# Patient Record
Sex: FEMALE | Race: WHITE | NOT HISPANIC OR LATINO | URBAN - METROPOLITAN AREA
[De-identification: names, ages, dates, MRNs, and addresses within clinical notes are randomized per-mention and may not be internally consistent; named-entity substitution may affect disease eponyms.]

---

## 2017-06-05 ENCOUNTER — OUTPATIENT (OUTPATIENT)
Dept: OUTPATIENT SERVICES | Facility: HOSPITAL | Age: 57
LOS: 1 days | End: 2017-06-05
Payer: COMMERCIAL

## 2017-06-05 VITALS
SYSTOLIC BLOOD PRESSURE: 139 MMHG | DIASTOLIC BLOOD PRESSURE: 78 MMHG | HEIGHT: 63 IN | WEIGHT: 160.5 LBS | TEMPERATURE: 97 F | HEART RATE: 83 BPM | RESPIRATION RATE: 16 BRPM

## 2017-06-05 DIAGNOSIS — Z80.0 FAMILY HISTORY OF MALIGNANT NEOPLASM OF DIGESTIVE ORGANS: ICD-10-CM

## 2017-06-05 DIAGNOSIS — Z01.818 ENCOUNTER FOR OTHER PREPROCEDURAL EXAMINATION: ICD-10-CM

## 2017-06-05 DIAGNOSIS — Z98.891 HISTORY OF UTERINE SCAR FROM PREVIOUS SURGERY: Chronic | ICD-10-CM

## 2017-06-05 LAB
ALBUMIN SERPL ELPH-MCNC: 4.4 G/DL — SIGNIFICANT CHANGE UP (ref 3.3–5.2)
ALP SERPL-CCNC: 93 U/L — SIGNIFICANT CHANGE UP (ref 40–120)
ALT FLD-CCNC: 23 U/L — SIGNIFICANT CHANGE UP
ANION GAP SERPL CALC-SCNC: 10 MMOL/L — SIGNIFICANT CHANGE UP (ref 5–17)
APTT BLD: 30.4 SEC — SIGNIFICANT CHANGE UP (ref 27.5–37.4)
AST SERPL-CCNC: 23 U/L — SIGNIFICANT CHANGE UP
BILIRUB SERPL-MCNC: 0.5 MG/DL — SIGNIFICANT CHANGE UP (ref 0.4–2)
BUN SERPL-MCNC: 24 MG/DL — HIGH (ref 8–20)
CALCIUM SERPL-MCNC: 9.6 MG/DL — SIGNIFICANT CHANGE UP (ref 8.6–10.2)
CHLORIDE SERPL-SCNC: 102 MMOL/L — SIGNIFICANT CHANGE UP (ref 98–107)
CO2 SERPL-SCNC: 28 MMOL/L — SIGNIFICANT CHANGE UP (ref 22–29)
CREAT SERPL-MCNC: 0.76 MG/DL — SIGNIFICANT CHANGE UP (ref 0.5–1.3)
GLUCOSE SERPL-MCNC: 115 MG/DL — SIGNIFICANT CHANGE UP (ref 70–115)
HCT VFR BLD CALC: 41.7 % — SIGNIFICANT CHANGE UP (ref 37–47)
HGB BLD-MCNC: 14.3 G/DL — SIGNIFICANT CHANGE UP (ref 12–16)
INR BLD: 0.88 RATIO — SIGNIFICANT CHANGE UP (ref 0.88–1.16)
MCHC RBC-ENTMCNC: 34.2 PG — HIGH (ref 27–31)
MCHC RBC-ENTMCNC: 34.3 G/DL — SIGNIFICANT CHANGE UP (ref 32–36)
MCV RBC AUTO: 99.8 FL — HIGH (ref 81–99)
PLATELET # BLD AUTO: 193 K/UL — SIGNIFICANT CHANGE UP (ref 150–400)
POTASSIUM SERPL-MCNC: 4.2 MMOL/L — SIGNIFICANT CHANGE UP (ref 3.5–5.3)
POTASSIUM SERPL-SCNC: 4.2 MMOL/L — SIGNIFICANT CHANGE UP (ref 3.5–5.3)
PROT SERPL-MCNC: 7.9 G/DL — SIGNIFICANT CHANGE UP (ref 6.6–8.7)
PROTHROM AB SERPL-ACNC: 9.7 SEC — LOW (ref 9.8–12.7)
RBC # BLD: 4.18 M/UL — LOW (ref 4.4–5.2)
RBC # FLD: 13.3 % — SIGNIFICANT CHANGE UP (ref 11–15.6)
SODIUM SERPL-SCNC: 140 MMOL/L — SIGNIFICANT CHANGE UP (ref 135–145)
WBC # BLD: 5.7 K/UL — SIGNIFICANT CHANGE UP (ref 4.8–10.8)
WBC # FLD AUTO: 5.7 K/UL — SIGNIFICANT CHANGE UP (ref 4.8–10.8)

## 2017-06-05 PROCEDURE — 80053 COMPREHEN METABOLIC PANEL: CPT

## 2017-06-05 PROCEDURE — 85610 PROTHROMBIN TIME: CPT

## 2017-06-05 PROCEDURE — G0463: CPT

## 2017-06-05 PROCEDURE — 93005 ELECTROCARDIOGRAM TRACING: CPT

## 2017-06-05 PROCEDURE — 85027 COMPLETE CBC AUTOMATED: CPT

## 2017-06-05 PROCEDURE — 85730 THROMBOPLASTIN TIME PARTIAL: CPT

## 2017-06-05 PROCEDURE — 93010 ELECTROCARDIOGRAM REPORT: CPT

## 2017-06-05 NOTE — H&P PST ADULT - NEGATIVE SKIN SYMPTOMS
no tumor/no pitted nails/no itching/no hair loss/no change in size/color of mole/no dryness/no brittle nails/no rash

## 2017-06-05 NOTE — H&P PST ADULT - NEGATIVE GENERAL SYMPTOMS
no malaise/no polyphagia/no weight loss/no fatigue/no polyuria/no anorexia/no weight gain/no fever/no chills/no polydipsia/no sweating

## 2017-06-05 NOTE — H&P PST ADULT - NEGATIVE CARDIOVASCULAR SYMPTOMS
no chest pain/no dyspnea on exertion/no paroxysmal nocturnal dyspnea/no peripheral edema/no orthopnea/no claudication

## 2017-06-05 NOTE — H&P PST ADULT - FAMILY HISTORY
Father  Still living? No  Family history of colon cancer, Age at diagnosis: Age Unknown     Mother  Still living? Yes, Estimated age: 81-90  Family history of early CAD, Age at diagnosis: Age Unknown     Sibling  Still living? Yes, Estimated age: 61-70  Family history of bipolar disorder, Age at diagnosis: Age Unknown

## 2017-06-05 NOTE — H&P PST ADULT - NEGATIVE FEMALE-SPECIFIC SYMPTOMS
no irregular menses/no vaginal discharge/no pelvic pain/no menorrhagia/no spotting/no abnormal vaginal bleeding/no amenorrhea/no dysmenorrhea

## 2017-06-05 NOTE — H&P PST ADULT - NEGATIVE ENMT SYMPTOMS
no abnormal taste sensation/no hearing difficulty/no nasal obstruction/no sinus symptoms/no post-nasal discharge/no dry mouth/no dysphagia/no vertigo/no nasal discharge/no tinnitus/no nose bleeds/no recurrent cold sores/no gum bleeding/no nasal congestion/no throat pain

## 2017-06-05 NOTE — H&P PST ADULT - PMH
Anxiety    Asthma    Bipolar 1 disorder    Chest pain  with anxiety  COPD (chronic obstructive pulmonary disease)    Depressed    Diverticulosis    Hypertension    Lipoma of abdominal wall  left lateral  JAGDISH (obstructive sleep apnea)  mild

## 2017-06-05 NOTE — H&P PST ADULT - NEGATIVE GENERAL GENITOURINARY SYMPTOMS
no nocturia/normal libido/no flank pain R/no bladder infections/no dysuria/no urinary hesitancy/normal urinary frequency/no urine discoloration/no flank pain L/no gas in urine/no renal colic/no hematuria

## 2017-06-05 NOTE — H&P PST ADULT - NEGATIVE MUSCULOSKELETAL SYMPTOMS
no stiffness/no joint swelling/no neck pain/no arm pain R/no myalgia/no leg pain L/no muscle weakness/no arthralgia/no muscle cramps/no arthritis/no back pain/no leg pain R/no arm pain L

## 2017-06-05 NOTE — H&P PST ADULT - NEGATIVE BREAST SYMPTOMS
no breast tenderness R/no nipple discharge L/no breast lump R/no breast lump L/no nipple discharge R/no breast tenderness L

## 2017-06-05 NOTE — H&P PST ADULT - MUSCULOSKELETAL
details… detailed exam normal strength/normal/decreased ROM/ROM intact/no joint erythema/no calf tenderness/no joint swelling/no joint warmth

## 2017-06-05 NOTE — H&P PST ADULT - RS GEN PE MLT RESP DETAILS PC
breath sounds equal/no subcutaneous emphysema/no wheezes/no rhonchi/good air movement/clear to auscultation bilaterally/no intercostal retractions/normal/airway patent/respirations non-labored/no rales/no chest wall tenderness

## 2017-06-05 NOTE — H&P PST ADULT - NEGATIVE OPHTHALMOLOGIC SYMPTOMS
no blurred vision R/no loss of vision R/no lacrimation L/no discharge L/no photophobia/no discharge R/no diplopia/no scleral injection L/no lacrimation R/no pain L/no pain R/no scleral injection R/no loss of vision L/no irritation L/no irritation R/no blurred vision L

## 2017-06-05 NOTE — H&P PST ADULT - GASTROINTESTINAL DETAILS
nontender/bowel sounds normal/normal/no rigidity/no organomegaly/no distention/no masses palpable/no bruit/soft/no rebound tenderness/no guarding

## 2017-06-05 NOTE — H&P PST ADULT - NEUROLOGICAL DETAILS
responds to verbal commands/deep reflexes intact/no spontaneous movement/normal strength/cranial nerves intact/sensation intact/alert and oriented x 3/superficial reflexes intact/responds to pain

## 2017-06-05 NOTE — H&P PST ADULT - NEGATIVE NEUROLOGICAL SYMPTOMS
no headache/no difficulty walking/no syncope/no hemiparesis/no tremors/no confusion/no generalized seizures/no loss of consciousness/no paresthesias/no weakness/no facial palsy/no transient paralysis/no loss of sensation/no vertigo/no focal seizures

## 2017-06-05 NOTE — H&P PST ADULT - HISTORY OF PRESENT ILLNESS
55 y/o female with h/o colon polyps now presents for colonoscopy with possible polypectomy and upper endoscopy with possible biopsy 55 y/o female with h/o colon polyps, large left abdominal lipoma and midline ventral hernia now presents for colonoscopy with possible polypectomy and upper endoscopy with possible biopsy

## 2017-06-05 NOTE — H&P PST ADULT - NSANTHOSAYNRD_GEN_A_CORE
No. JAGDISH screening performed.  STOP BANG Legend: 0-2 = LOW Risk; 3-4 = INTERMEDIATE Risk; 5-8 = HIGH Risk

## 2017-07-06 ENCOUNTER — OUTPATIENT (OUTPATIENT)
Dept: OUTPATIENT SERVICES | Facility: HOSPITAL | Age: 57
LOS: 1 days | End: 2017-07-06

## 2017-07-06 VITALS
TEMPERATURE: 98 F | HEART RATE: 86 BPM | DIASTOLIC BLOOD PRESSURE: 80 MMHG | WEIGHT: 158.73 LBS | HEIGHT: 63 IN | RESPIRATION RATE: 20 BRPM | SYSTOLIC BLOOD PRESSURE: 120 MMHG

## 2017-07-06 DIAGNOSIS — Z98.891 HISTORY OF UTERINE SCAR FROM PREVIOUS SURGERY: Chronic | ICD-10-CM

## 2017-07-06 DIAGNOSIS — Z01.818 ENCOUNTER FOR OTHER PREPROCEDURAL EXAMINATION: ICD-10-CM

## 2017-07-06 NOTE — H&P PST ADULT - NEGATIVE ENMT SYMPTOMS
no nasal discharge/no nasal obstruction/no post-nasal discharge/no hearing difficulty/no gum bleeding/no ear pain/no nose bleeds/no recurrent cold sores/no tinnitus/no throat pain/no nasal congestion/no dysphagia/no abnormal taste sensation/no dry mouth/no vertigo/no sinus symptoms

## 2017-07-06 NOTE — H&P PST ADULT - NEGATIVE GENERAL GENITOURINARY SYMPTOMS
no renal colic/no incontinence/no urinary hesitancy/no dysuria/no nocturia/no hematuria/no flank pain R/no gas in urine/no flank pain L/normal libido/no bladder infections/no urine discoloration/normal urinary frequency

## 2017-07-06 NOTE — H&P PST ADULT - NEGATIVE GASTROINTESTINAL SYMPTOMS
no diarrhea/no vomiting/no abdominal pain/no flatulence/no constipation/no steatorrhea/no jaundice/no hematochezia/no nausea/no hiccoughs/no melena/no change in bowel habits

## 2017-07-06 NOTE — H&P PST ADULT - NEGATIVE MUSCULOSKELETAL SYMPTOMS
no muscle weakness/no leg pain L/no leg pain R/no joint swelling/no arthralgia/no back pain/no muscle cramps/no stiffness/no neck pain/no arthritis/no myalgia/no arm pain L/no arm pain R

## 2017-07-06 NOTE — H&P PST ADULT - NEGATIVE OPHTHALMOLOGIC SYMPTOMS
no discharge L/no photophobia/no lacrimation L/no blurred vision R/no irritation R/no loss of vision L/no pain R/no scleral injection L/no irritation L/no loss of vision R/no scleral injection R/no discharge R/no diplopia/no pain L/no lacrimation R/no blurred vision L

## 2017-07-06 NOTE — H&P PST ADULT - NEGATIVE PSYCHIATRIC SYMPTOMS
no hyperactivity/no anxiety/no agitation/no insomnia/no paranoia/no suicidal ideation/no visual hallucinations/no depression/no memory loss/no mood swings/no auditory hallucinations

## 2017-07-06 NOTE — H&P PST ADULT - NEGATIVE NEUROLOGICAL SYMPTOMS
no loss of consciousness/no hemiparesis/no vertigo/no loss of sensation/no focal seizures/no paresthesias/no weakness/no headache/no generalized seizures/no tremors/no facial palsy/no difficulty walking/no confusion/no transient paralysis/no syncope

## 2017-07-06 NOTE — H&P PST ADULT - NEGATIVE BREAST SYMPTOMS
no breast tenderness L/no breast lump R/no nipple discharge R/no nipple discharge L/no breast tenderness R/no breast lump L

## 2017-07-06 NOTE — H&P PST ADULT - NEGATIVE SKIN SYMPTOMS
no itching/no change in size/color of mole/no hair loss/no rash/no dryness/no tumor/no pitted nails/no brittle nails

## 2017-07-06 NOTE — H&P PST ADULT - NEGATIVE GENERAL SYMPTOMS
no fever/no polydipsia/no weight loss/no chills/no fatigue/no malaise/no weight gain/no sweating/no polyphagia/no polyuria/no anorexia

## 2017-07-06 NOTE — H&P PST ADULT - NEGATIVE FEMALE-SPECIFIC SYMPTOMS
no dysmenorrhea/no irregular menses/no spotting/no amenorrhea/no vaginal discharge/no abnormal vaginal bleeding/no pelvic pain/no menorrhagia

## 2017-07-06 NOTE — H&P PST ADULT - NEGATIVE CARDIOVASCULAR SYMPTOMS
no paroxysmal nocturnal dyspnea/no palpitations/no chest pain/no claudication/no orthopnea/no peripheral edema/no dyspnea on exertion

## 2017-07-07 ENCOUNTER — OUTPATIENT (OUTPATIENT)
Dept: OUTPATIENT SERVICES | Facility: HOSPITAL | Age: 57
LOS: 1 days | End: 2017-07-07
Payer: COMMERCIAL

## 2017-07-07 ENCOUNTER — APPOINTMENT (OUTPATIENT)
Dept: PULMONOLOGY | Facility: CLINIC | Age: 57
End: 2017-07-07

## 2017-07-07 VITALS — HEIGHT: 62.5 IN | WEIGHT: 162 LBS | BODY MASS INDEX: 29.07 KG/M2

## 2017-07-07 VITALS — OXYGEN SATURATION: 95 % | HEART RATE: 92 BPM | DIASTOLIC BLOOD PRESSURE: 70 MMHG | SYSTOLIC BLOOD PRESSURE: 130 MMHG

## 2017-07-07 DIAGNOSIS — Z87.19 PERSONAL HISTORY OF OTHER DISEASES OF THE DIGESTIVE SYSTEM: ICD-10-CM

## 2017-07-07 DIAGNOSIS — Z00.00 ENCOUNTER FOR GENERAL ADULT MEDICAL EXAMINATION W/OUT ABNORMAL FINDINGS: ICD-10-CM

## 2017-07-07 DIAGNOSIS — Z01.818 ENCOUNTER FOR OTHER PREPROCEDURAL EXAMINATION: ICD-10-CM

## 2017-07-07 DIAGNOSIS — Z78.9 OTHER SPECIFIED HEALTH STATUS: ICD-10-CM

## 2017-07-07 DIAGNOSIS — Z98.891 HISTORY OF UTERINE SCAR FROM PREVIOUS SURGERY: Chronic | ICD-10-CM

## 2017-07-07 LAB
ALBUMIN SERPL ELPH-MCNC: 4.5 G/DL — SIGNIFICANT CHANGE UP (ref 3.3–5.2)
ALP SERPL-CCNC: 100 U/L — SIGNIFICANT CHANGE UP (ref 40–120)
ALT FLD-CCNC: 18 U/L — SIGNIFICANT CHANGE UP
ANION GAP SERPL CALC-SCNC: 16 MMOL/L — SIGNIFICANT CHANGE UP (ref 5–17)
APTT BLD: 28.4 SEC — SIGNIFICANT CHANGE UP (ref 27.5–37.4)
AST SERPL-CCNC: 25 U/L — SIGNIFICANT CHANGE UP
BASOPHILS # BLD AUTO: 0 K/UL — SIGNIFICANT CHANGE UP (ref 0–0.2)
BASOPHILS NFR BLD AUTO: 0.4 % — SIGNIFICANT CHANGE UP (ref 0–2)
BILIRUB SERPL-MCNC: 0.3 MG/DL — LOW (ref 0.4–2)
BUN SERPL-MCNC: 28 MG/DL — HIGH (ref 8–20)
CALCIUM SERPL-MCNC: 9.3 MG/DL — SIGNIFICANT CHANGE UP (ref 8.6–10.2)
CHLORIDE SERPL-SCNC: 100 MMOL/L — SIGNIFICANT CHANGE UP (ref 98–107)
CO2 SERPL-SCNC: 24 MMOL/L — SIGNIFICANT CHANGE UP (ref 22–29)
CREAT SERPL-MCNC: 1.01 MG/DL — SIGNIFICANT CHANGE UP (ref 0.5–1.3)
EOSINOPHIL # BLD AUTO: 0.1 K/UL — SIGNIFICANT CHANGE UP (ref 0–0.5)
EOSINOPHIL NFR BLD AUTO: 2.6 % — SIGNIFICANT CHANGE UP (ref 0–6)
GLUCOSE SERPL-MCNC: 135 MG/DL — HIGH (ref 70–115)
HCT VFR BLD CALC: 38.3 % — SIGNIFICANT CHANGE UP (ref 37–47)
HGB BLD-MCNC: 13.3 G/DL — SIGNIFICANT CHANGE UP (ref 12–16)
INR BLD: 0.91 RATIO — SIGNIFICANT CHANGE UP (ref 0.88–1.16)
LYMPHOCYTES # BLD AUTO: 1.6 K/UL — SIGNIFICANT CHANGE UP (ref 1–4.8)
LYMPHOCYTES # BLD AUTO: 29 % — SIGNIFICANT CHANGE UP (ref 20–55)
MCHC RBC-ENTMCNC: 33.7 PG — HIGH (ref 27–31)
MCHC RBC-ENTMCNC: 34.7 G/DL — SIGNIFICANT CHANGE UP (ref 32–36)
MCV RBC AUTO: 97 FL — SIGNIFICANT CHANGE UP (ref 81–99)
MONOCYTES # BLD AUTO: 0.5 K/UL — SIGNIFICANT CHANGE UP (ref 0–0.8)
MONOCYTES NFR BLD AUTO: 9.7 % — SIGNIFICANT CHANGE UP (ref 3–10)
NEUTROPHILS # BLD AUTO: 3.1 K/UL — SIGNIFICANT CHANGE UP (ref 1.8–8)
NEUTROPHILS NFR BLD AUTO: 58.1 % — SIGNIFICANT CHANGE UP (ref 37–73)
PLATELET # BLD AUTO: 216 K/UL — SIGNIFICANT CHANGE UP (ref 150–400)
POTASSIUM SERPL-MCNC: 3.6 MMOL/L — SIGNIFICANT CHANGE UP (ref 3.5–5.3)
POTASSIUM SERPL-SCNC: 3.6 MMOL/L — SIGNIFICANT CHANGE UP (ref 3.5–5.3)
PROT SERPL-MCNC: 7.3 G/DL — SIGNIFICANT CHANGE UP (ref 6.6–8.7)
PROTHROM AB SERPL-ACNC: 10 SEC — SIGNIFICANT CHANGE UP (ref 9.8–12.7)
RBC # BLD: 3.95 M/UL — LOW (ref 4.4–5.2)
RBC # FLD: 13.2 % — SIGNIFICANT CHANGE UP (ref 11–15.6)
SODIUM SERPL-SCNC: 140 MMOL/L — SIGNIFICANT CHANGE UP (ref 135–145)
WBC # BLD: 5.4 K/UL — SIGNIFICANT CHANGE UP (ref 4.8–10.8)
WBC # FLD AUTO: 5.4 K/UL — SIGNIFICANT CHANGE UP (ref 4.8–10.8)

## 2017-07-07 PROCEDURE — 36415 COLL VENOUS BLD VENIPUNCTURE: CPT

## 2017-07-07 PROCEDURE — 85730 THROMBOPLASTIN TIME PARTIAL: CPT

## 2017-07-07 PROCEDURE — 80053 COMPREHEN METABOLIC PANEL: CPT

## 2017-07-07 PROCEDURE — 85027 COMPLETE CBC AUTOMATED: CPT

## 2017-07-07 PROCEDURE — 85610 PROTHROMBIN TIME: CPT

## 2017-07-07 RX ORDER — AMOXICILLIN AND CLAVULANATE POTASSIUM 875; 125 MG/1; MG/1
875-125 TABLET, COATED ORAL
Qty: 20 | Refills: 0 | Status: DISCONTINUED | COMMUNITY
Start: 2017-06-08 | End: 2017-07-07

## 2017-07-10 RX ORDER — TIOTROPIUM BROMIDE 18 UG/1
18 CAPSULE ORAL; RESPIRATORY (INHALATION) DAILY
Qty: 1 | Refills: 0 | Status: DISCONTINUED | COMMUNITY
Start: 2017-07-07 | End: 2017-07-10

## 2017-07-13 ENCOUNTER — OUTPATIENT (OUTPATIENT)
Dept: OUTPATIENT SERVICES | Facility: HOSPITAL | Age: 57
LOS: 1 days | End: 2017-07-13
Payer: COMMERCIAL

## 2017-07-13 ENCOUNTER — RESULT REVIEW (OUTPATIENT)
Age: 57
End: 2017-07-13

## 2017-07-13 DIAGNOSIS — Z98.891 HISTORY OF UTERINE SCAR FROM PREVIOUS SURGERY: Chronic | ICD-10-CM

## 2017-07-13 DIAGNOSIS — Z80.0 FAMILY HISTORY OF MALIGNANT NEOPLASM OF DIGESTIVE ORGANS: ICD-10-CM

## 2017-07-13 DIAGNOSIS — K59.00 CONSTIPATION, UNSPECIFIED: ICD-10-CM

## 2017-07-13 PROCEDURE — 88305 TISSUE EXAM BY PATHOLOGIST: CPT | Mod: 26

## 2017-07-13 PROCEDURE — 43239 EGD BIOPSY SINGLE/MULTIPLE: CPT

## 2017-07-13 PROCEDURE — 88342 IMHCHEM/IMCYTCHM 1ST ANTB: CPT

## 2017-07-13 PROCEDURE — 88305 TISSUE EXAM BY PATHOLOGIST: CPT

## 2017-07-13 PROCEDURE — 45378 DIAGNOSTIC COLONOSCOPY: CPT

## 2017-07-13 PROCEDURE — 88342 IMHCHEM/IMCYTCHM 1ST ANTB: CPT | Mod: 26

## 2017-07-14 ENCOUNTER — TRANSCRIPTION ENCOUNTER (OUTPATIENT)
Age: 57
End: 2017-07-14

## 2017-07-14 LAB — SURGICAL PATHOLOGY FINAL REPORT - CH: SIGNIFICANT CHANGE UP

## 2017-08-01 ENCOUNTER — APPOINTMENT (OUTPATIENT)
Dept: INTERNAL MEDICINE | Facility: CLINIC | Age: 57
End: 2017-08-01
Payer: MEDICAID

## 2017-08-01 ENCOUNTER — NON-APPOINTMENT (OUTPATIENT)
Age: 57
End: 2017-08-01

## 2017-08-01 VITALS
HEIGHT: 62.5 IN | OXYGEN SATURATION: 95 % | TEMPERATURE: 97.7 F | BODY MASS INDEX: 29.43 KG/M2 | HEART RATE: 96 BPM | WEIGHT: 164 LBS | DIASTOLIC BLOOD PRESSURE: 90 MMHG | SYSTOLIC BLOOD PRESSURE: 150 MMHG

## 2017-08-01 DIAGNOSIS — G25.81 RESTLESS LEGS SYNDROME: ICD-10-CM

## 2017-08-01 PROCEDURE — 93000 ELECTROCARDIOGRAM COMPLETE: CPT

## 2017-08-01 PROCEDURE — 82044 UR ALBUMIN SEMIQUANTITATIVE: CPT | Mod: QW

## 2017-08-01 PROCEDURE — 92552 PURE TONE AUDIOMETRY AIR: CPT

## 2017-08-01 PROCEDURE — 94010 BREATHING CAPACITY TEST: CPT

## 2017-08-01 PROCEDURE — 36415 COLL VENOUS BLD VENIPUNCTURE: CPT

## 2017-08-01 PROCEDURE — 99204 OFFICE O/P NEW MOD 45 MIN: CPT | Mod: 25

## 2017-08-01 RX ORDER — METOPROLOL TARTRATE AND HYDROCHLOROTHIAZIDE 100; 25 MG/1; MG/1
100-25 TABLET ORAL
Qty: 30 | Refills: 0 | Status: DISCONTINUED | COMMUNITY
Start: 2017-04-22 | End: 2017-08-01

## 2017-08-01 RX ORDER — METOPROLOL SUCCINATE 100 MG/1
100 TABLET, EXTENDED RELEASE ORAL
Qty: 30 | Refills: 0 | Status: DISCONTINUED | COMMUNITY
Start: 2017-03-16 | End: 2017-08-01

## 2017-08-01 RX ORDER — TRAZODONE HYDROCHLORIDE 50 MG/1
50 TABLET ORAL
Qty: 45 | Refills: 0 | Status: DISCONTINUED | COMMUNITY
Start: 2017-01-26 | End: 2017-08-01

## 2017-08-01 RX ORDER — BUSPIRONE HYDROCHLORIDE 10 MG/1
10 TABLET ORAL
Qty: 180 | Refills: 0 | Status: DISCONTINUED | COMMUNITY
Start: 2017-06-20 | End: 2017-08-01

## 2017-08-01 RX ORDER — NAPROXEN 500 MG/1
500 TABLET ORAL
Qty: 60 | Refills: 0 | Status: DISCONTINUED | COMMUNITY
Start: 2017-06-08 | End: 2017-08-01

## 2017-08-01 RX ORDER — LOSARTAN POTASSIUM 25 MG/1
25 TABLET, FILM COATED ORAL
Qty: 30 | Refills: 0 | Status: DISCONTINUED | COMMUNITY
Start: 2017-05-02 | End: 2017-08-01

## 2017-08-01 RX ORDER — BUSPIRONE HYDROCHLORIDE 15 MG/1
15 TABLET ORAL
Qty: 90 | Refills: 0 | Status: DISCONTINUED | COMMUNITY
Start: 2017-02-21 | End: 2017-08-01

## 2017-08-02 LAB
25(OH)D3 SERPL-MCNC: 34.3 NG/ML
ALBUMIN SERPL ELPH-MCNC: 4.4 G/DL
ALBUMIN: 80
ALP BLD-CCNC: 98 U/L
ALT SERPL-CCNC: 25 U/L
ANION GAP SERPL CALC-SCNC: 19 MMOL/L
APPEARANCE: CLEAR
AST SERPL-CCNC: 30 U/L
BASOPHILS # BLD AUTO: 0.04 K/UL
BASOPHILS NFR BLD AUTO: 0.7 %
BILIRUB SERPL-MCNC: <0.2 MG/DL
BILIRUBIN URINE: NEGATIVE
BLOOD URINE: NEGATIVE
BUN SERPL-MCNC: 25 MG/DL
CALCIUM SERPL-MCNC: 9.6 MG/DL
CHLORIDE SERPL-SCNC: 107 MMOL/L
CHOLEST SERPL-MCNC: 263 MG/DL
CHOLEST/HDLC SERPL: 3 RATIO
CK SERPL-CCNC: 313 U/L
CO2 SERPL-SCNC: 20 MMOL/L
COLOR: YELLOW
CREAT SERPL-MCNC: 1.1 MG/DL
CREATININE: 100
EOSINOPHIL # BLD AUTO: 0.35 K/UL
EOSINOPHIL NFR BLD AUTO: 6.4 %
GLUCOSE QUALITATIVE U: NORMAL MG/DL
GLUCOSE SERPL-MCNC: 107 MG/DL
HBA1C MFR BLD HPLC: 5.4 %
HCT VFR BLD CALC: 39.5 %
HDLC SERPL-MCNC: 89 MG/DL
HGB BLD-MCNC: 12.9 G/DL
IMM GRANULOCYTES NFR BLD AUTO: 0.2 %
KETONES URINE: NEGATIVE
LDLC SERPL CALC-MCNC: 145 MG/DL
LEUKOCYTE ESTERASE URINE: NEGATIVE
LYMPHOCYTES # BLD AUTO: 1.68 K/UL
LYMPHOCYTES NFR BLD AUTO: 30.5 %
MAN DIFF?: NORMAL
MCHC RBC-ENTMCNC: 32.7 GM/DL
MCHC RBC-ENTMCNC: 33.1 PG
MCV RBC AUTO: 101.3 FL
MICROALBUMIN/CREAT UR TEST STR-RTO: <30
MONOCYTES # BLD AUTO: 0.65 K/UL
MONOCYTES NFR BLD AUTO: 11.8 %
NEUTROPHILS # BLD AUTO: 2.77 K/UL
NEUTROPHILS NFR BLD AUTO: 50.4 %
NITRITE URINE: NEGATIVE
PH URINE: 5.5
PLATELET # BLD AUTO: 315 K/UL
POTASSIUM SERPL-SCNC: 4.5 MMOL/L
PROT SERPL-MCNC: 7.4 G/DL
PROTEIN URINE: NEGATIVE MG/DL
RBC # BLD: 3.9 M/UL
RBC # FLD: 13.9 %
SODIUM SERPL-SCNC: 146 MMOL/L
SPECIFIC GRAVITY URINE: 1.02
TRIGL SERPL-MCNC: 145 MG/DL
TSH SERPL-ACNC: 2.41 UIU/ML
URATE SERPL-MCNC: 4.8 MG/DL
UROBILINOGEN URINE: NORMAL MG/DL
WBC # FLD AUTO: 5.5 K/UL

## 2017-08-16 ENCOUNTER — MEDICATION RENEWAL (OUTPATIENT)
Age: 57
End: 2017-08-16

## 2017-09-05 ENCOUNTER — MEDICATION RENEWAL (OUTPATIENT)
Age: 57
End: 2017-09-05

## 2017-09-12 ENCOUNTER — RX RENEWAL (OUTPATIENT)
Age: 57
End: 2017-09-12

## 2017-09-13 ENCOUNTER — MEDICATION RENEWAL (OUTPATIENT)
Age: 57
End: 2017-09-13

## 2017-10-03 ENCOUNTER — APPOINTMENT (OUTPATIENT)
Dept: INTERNAL MEDICINE | Facility: CLINIC | Age: 57
End: 2017-10-03
Payer: MEDICAID

## 2017-10-03 ENCOUNTER — OTHER (OUTPATIENT)
Age: 57
End: 2017-10-03

## 2017-10-03 VITALS
WEIGHT: 174 LBS | HEART RATE: 87 BPM | BODY MASS INDEX: 31.22 KG/M2 | TEMPERATURE: 98 F | OXYGEN SATURATION: 97 % | HEIGHT: 62.5 IN | DIASTOLIC BLOOD PRESSURE: 92 MMHG | SYSTOLIC BLOOD PRESSURE: 150 MMHG

## 2017-10-03 PROCEDURE — 99215 OFFICE O/P EST HI 40 MIN: CPT | Mod: 25

## 2017-10-03 PROCEDURE — 90688 IIV4 VACCINE SPLT 0.5 ML IM: CPT

## 2017-10-03 PROCEDURE — G0008: CPT

## 2017-10-11 ENCOUNTER — MEDICATION RENEWAL (OUTPATIENT)
Age: 57
End: 2017-10-11

## 2017-10-17 ENCOUNTER — APPOINTMENT (OUTPATIENT)
Dept: INTERNAL MEDICINE | Facility: CLINIC | Age: 57
End: 2017-10-17

## 2017-10-18 ENCOUNTER — APPOINTMENT (OUTPATIENT)
Dept: INTERNAL MEDICINE | Facility: CLINIC | Age: 57
End: 2017-10-18
Payer: MEDICAID

## 2017-10-18 ENCOUNTER — OTHER (OUTPATIENT)
Age: 57
End: 2017-10-18

## 2017-10-18 VITALS
HEIGHT: 62.5 IN | DIASTOLIC BLOOD PRESSURE: 80 MMHG | TEMPERATURE: 97.7 F | SYSTOLIC BLOOD PRESSURE: 134 MMHG | WEIGHT: 174 LBS | HEART RATE: 87 BPM | OXYGEN SATURATION: 97 % | BODY MASS INDEX: 31.22 KG/M2

## 2017-10-18 PROCEDURE — 99214 OFFICE O/P EST MOD 30 MIN: CPT | Mod: 25

## 2017-10-18 PROCEDURE — 99407 BEHAV CHNG SMOKING > 10 MIN: CPT

## 2017-10-21 ENCOUNTER — TRANSCRIPTION ENCOUNTER (OUTPATIENT)
Age: 57
End: 2017-10-21

## 2017-11-02 ENCOUNTER — FORM ENCOUNTER (OUTPATIENT)
Age: 57
End: 2017-11-02

## 2017-11-03 ENCOUNTER — NON-APPOINTMENT (OUTPATIENT)
Age: 57
End: 2017-11-03

## 2017-11-03 ENCOUNTER — OUTPATIENT (OUTPATIENT)
Dept: OUTPATIENT SERVICES | Facility: HOSPITAL | Age: 57
LOS: 1 days | End: 2017-11-03
Payer: COMMERCIAL

## 2017-11-03 ENCOUNTER — APPOINTMENT (OUTPATIENT)
Dept: INTERNAL MEDICINE | Facility: CLINIC | Age: 57
End: 2017-11-03
Payer: MEDICAID

## 2017-11-03 ENCOUNTER — APPOINTMENT (OUTPATIENT)
Dept: CT IMAGING | Facility: CLINIC | Age: 57
End: 2017-11-03
Payer: MEDICAID

## 2017-11-03 VITALS
SYSTOLIC BLOOD PRESSURE: 140 MMHG | HEART RATE: 91 BPM | HEIGHT: 62.5 IN | OXYGEN SATURATION: 97 % | BODY MASS INDEX: 31.58 KG/M2 | DIASTOLIC BLOOD PRESSURE: 86 MMHG | TEMPERATURE: 98.1 F | WEIGHT: 176 LBS

## 2017-11-03 DIAGNOSIS — Z98.891 HISTORY OF UTERINE SCAR FROM PREVIOUS SURGERY: Chronic | ICD-10-CM

## 2017-11-03 DIAGNOSIS — K57.93 DIVERTICULITIS OF INTESTINE, PART UNSPECIFIED, WITHOUT PERFORATION OR ABSCESS WITH BLEEDING: ICD-10-CM

## 2017-11-03 DIAGNOSIS — R07.81 PLEURODYNIA: ICD-10-CM

## 2017-11-03 PROCEDURE — 99215 OFFICE O/P EST HI 40 MIN: CPT | Mod: 25

## 2017-11-03 PROCEDURE — 94060 EVALUATION OF WHEEZING: CPT

## 2017-11-03 PROCEDURE — 74176 CT ABD & PELVIS W/O CONTRAST: CPT

## 2017-11-03 PROCEDURE — 74176 CT ABD & PELVIS W/O CONTRAST: CPT | Mod: 26

## 2017-11-06 ENCOUNTER — APPOINTMENT (OUTPATIENT)
Dept: PULMONOLOGY | Facility: CLINIC | Age: 57
End: 2017-11-06
Payer: MEDICAID

## 2017-11-06 VITALS
OXYGEN SATURATION: 87 % | SYSTOLIC BLOOD PRESSURE: 140 MMHG | DIASTOLIC BLOOD PRESSURE: 90 MMHG | RESPIRATION RATE: 23 BRPM | TEMPERATURE: 98.2 F | HEART RATE: 90 BPM

## 2017-11-06 PROCEDURE — 99214 OFFICE O/P EST MOD 30 MIN: CPT

## 2017-11-07 ENCOUNTER — APPOINTMENT (OUTPATIENT)
Dept: MAMMOGRAPHY | Facility: CLINIC | Age: 57
End: 2017-11-07

## 2017-11-09 ENCOUNTER — MEDICATION RENEWAL (OUTPATIENT)
Age: 57
End: 2017-11-09

## 2017-11-10 ENCOUNTER — APPOINTMENT (OUTPATIENT)
Dept: INTERNAL MEDICINE | Facility: CLINIC | Age: 57
End: 2017-11-10

## 2017-11-20 ENCOUNTER — APPOINTMENT (OUTPATIENT)
Dept: PULMONOLOGY | Facility: CLINIC | Age: 57
End: 2017-11-20

## 2017-11-27 ENCOUNTER — APPOINTMENT (OUTPATIENT)
Dept: PULMONOLOGY | Facility: CLINIC | Age: 57
End: 2017-11-27
Payer: MEDICAID

## 2017-11-27 VITALS
HEIGHT: 62 IN | DIASTOLIC BLOOD PRESSURE: 76 MMHG | SYSTOLIC BLOOD PRESSURE: 136 MMHG | BODY MASS INDEX: 33.21 KG/M2 | WEIGHT: 180.5 LBS | OXYGEN SATURATION: 99 % | HEART RATE: 90 BPM

## 2017-11-27 PROCEDURE — 99215 OFFICE O/P EST HI 40 MIN: CPT

## 2017-11-27 RX ORDER — UMECLIDINIUM 62.5 UG/1
62.5 AEROSOL, POWDER ORAL DAILY
Qty: 1 | Refills: 5 | Status: DISCONTINUED | COMMUNITY
End: 2017-11-27

## 2017-11-27 RX ORDER — ERGOCALCIFEROL 1.25 MG/1
1.25 MG CAPSULE, LIQUID FILLED ORAL
Qty: 4 | Refills: 0 | Status: DISCONTINUED | COMMUNITY
Start: 2017-01-27 | End: 2017-11-27

## 2017-11-27 RX ORDER — NICOTINE 4 MG/1
10 INHALANT RESPIRATORY (INHALATION)
Qty: 1 | Refills: 2 | Status: DISCONTINUED | COMMUNITY
Start: 2017-10-18 | End: 2017-11-27

## 2017-11-27 RX ORDER — PREDNISONE 5 MG/1
5 TABLET ORAL
Qty: 75 | Refills: 0 | Status: DISCONTINUED | COMMUNITY
Start: 2017-11-06 | End: 2017-11-27

## 2017-11-29 ENCOUNTER — APPOINTMENT (OUTPATIENT)
Dept: INTERNAL MEDICINE | Facility: CLINIC | Age: 57
End: 2017-11-29
Payer: MEDICAID

## 2017-11-29 ENCOUNTER — MEDICATION RENEWAL (OUTPATIENT)
Age: 57
End: 2017-11-29

## 2017-11-29 VITALS
BODY MASS INDEX: 33.31 KG/M2 | HEIGHT: 62 IN | DIASTOLIC BLOOD PRESSURE: 84 MMHG | OXYGEN SATURATION: 97 % | HEART RATE: 83 BPM | WEIGHT: 181 LBS | SYSTOLIC BLOOD PRESSURE: 120 MMHG | TEMPERATURE: 97.8 F

## 2017-11-29 PROCEDURE — 99214 OFFICE O/P EST MOD 30 MIN: CPT | Mod: 25

## 2017-11-29 PROCEDURE — 36415 COLL VENOUS BLD VENIPUNCTURE: CPT

## 2017-11-29 RX ORDER — METRONIDAZOLE 500 MG/1
500 TABLET ORAL 3 TIMES DAILY
Qty: 30 | Refills: 0 | Status: DISCONTINUED | COMMUNITY
Start: 2017-11-03 | End: 2017-11-29

## 2017-12-01 ENCOUNTER — CHART COPY (OUTPATIENT)
Age: 57
End: 2017-12-01

## 2017-12-01 ENCOUNTER — APPOINTMENT (OUTPATIENT)
Dept: INTERNAL MEDICINE | Facility: CLINIC | Age: 57
End: 2017-12-01

## 2017-12-01 ENCOUNTER — MEDICATION RENEWAL (OUTPATIENT)
Age: 57
End: 2017-12-01

## 2017-12-01 LAB
ALBUMIN SERPL ELPH-MCNC: 3.8 G/DL
ALP BLD-CCNC: 67 U/L
ALT SERPL-CCNC: 15 U/L
ANION GAP SERPL CALC-SCNC: 13 MMOL/L
AST SERPL-CCNC: 14 U/L
BASOPHILS # BLD AUTO: 0.02 K/UL
BASOPHILS NFR BLD AUTO: 0.3 %
BILIRUB SERPL-MCNC: 0.3 MG/DL
BUN SERPL-MCNC: 24 MG/DL
CALCIUM SERPL-MCNC: 9.2 MG/DL
CHLORIDE SERPL-SCNC: 101 MMOL/L
CO2 SERPL-SCNC: 27 MMOL/L
CREAT SERPL-MCNC: 1.01 MG/DL
CRP SERPL-MCNC: 1.1 MG/DL
EOSINOPHIL # BLD AUTO: 0.22 K/UL
EOSINOPHIL NFR BLD AUTO: 3.1 %
ERYTHROCYTE [SEDIMENTATION RATE] IN BLOOD BY WESTERGREN METHOD: 48 MM/HR
GLUCOSE SERPL-MCNC: 103 MG/DL
HCT VFR BLD CALC: 38.6 %
HGB BLD-MCNC: 12.4 G/DL
IMM GRANULOCYTES NFR BLD AUTO: 0.1 %
LYMPHOCYTES # BLD AUTO: 1.48 K/UL
LYMPHOCYTES NFR BLD AUTO: 20.6 %
MAN DIFF?: NORMAL
MCHC RBC-ENTMCNC: 32.1 GM/DL
MCHC RBC-ENTMCNC: 33.3 PG
MCV RBC AUTO: 103.8 FL
MONOCYTES # BLD AUTO: 0.77 K/UL
MONOCYTES NFR BLD AUTO: 10.7 %
NEUTROPHILS # BLD AUTO: 4.69 K/UL
NEUTROPHILS NFR BLD AUTO: 65.2 %
PLATELET # BLD AUTO: 289 K/UL
POTASSIUM SERPL-SCNC: 4.7 MMOL/L
PROT SERPL-MCNC: 6.2 G/DL
RBC # BLD: 3.72 M/UL
RBC # FLD: 14.1 %
SODIUM SERPL-SCNC: 141 MMOL/L
WBC # FLD AUTO: 7.19 K/UL

## 2017-12-02 LAB
A1AT SERPL-MCNC: 180 MG/DL
CANCER AG125 SERPL-ACNC: 6 U/ML
FOLATE SERPL-MCNC: 12.2 NG/ML
TOTAL IGE SMQN RAST: 146 KU/L
VIT B12 SERPL-MCNC: 317 PG/ML

## 2017-12-03 ENCOUNTER — FORM ENCOUNTER (OUTPATIENT)
Age: 57
End: 2017-12-03

## 2017-12-04 ENCOUNTER — APPOINTMENT (OUTPATIENT)
Dept: CT IMAGING | Facility: CLINIC | Age: 57
End: 2017-12-04
Payer: MEDICAID

## 2017-12-04 ENCOUNTER — OUTPATIENT (OUTPATIENT)
Dept: OUTPATIENT SERVICES | Facility: HOSPITAL | Age: 57
LOS: 1 days | End: 2017-12-04
Payer: COMMERCIAL

## 2017-12-04 DIAGNOSIS — Z98.891 HISTORY OF UTERINE SCAR FROM PREVIOUS SURGERY: Chronic | ICD-10-CM

## 2017-12-04 DIAGNOSIS — Z87.891 PERSONAL HISTORY OF NICOTINE DEPENDENCE: ICD-10-CM

## 2017-12-04 PROCEDURE — G0297: CPT

## 2017-12-04 PROCEDURE — G0297: CPT | Mod: 26

## 2017-12-08 ENCOUNTER — APPOINTMENT (OUTPATIENT)
Dept: INTERNAL MEDICINE | Facility: CLINIC | Age: 57
End: 2017-12-08
Payer: MEDICAID

## 2017-12-08 VITALS
SYSTOLIC BLOOD PRESSURE: 140 MMHG | HEART RATE: 93 BPM | BODY MASS INDEX: 32.39 KG/M2 | DIASTOLIC BLOOD PRESSURE: 80 MMHG | TEMPERATURE: 97.9 F | WEIGHT: 176 LBS | OXYGEN SATURATION: 93 % | HEIGHT: 62 IN

## 2017-12-08 DIAGNOSIS — R63.5 ABNORMAL WEIGHT GAIN: ICD-10-CM

## 2017-12-08 PROCEDURE — 99213 OFFICE O/P EST LOW 20 MIN: CPT

## 2017-12-13 ENCOUNTER — FORM ENCOUNTER (OUTPATIENT)
Age: 57
End: 2017-12-13

## 2017-12-14 ENCOUNTER — APPOINTMENT (OUTPATIENT)
Dept: MRI IMAGING | Facility: CLINIC | Age: 57
End: 2017-12-14
Payer: MEDICAID

## 2017-12-14 ENCOUNTER — OUTPATIENT (OUTPATIENT)
Dept: OUTPATIENT SERVICES | Facility: HOSPITAL | Age: 57
LOS: 1 days | End: 2017-12-14
Payer: COMMERCIAL

## 2017-12-14 DIAGNOSIS — Z98.891 HISTORY OF UTERINE SCAR FROM PREVIOUS SURGERY: Chronic | ICD-10-CM

## 2017-12-14 DIAGNOSIS — M54.9 DORSALGIA, UNSPECIFIED: ICD-10-CM

## 2017-12-14 PROCEDURE — 72148 MRI LUMBAR SPINE W/O DYE: CPT

## 2017-12-14 PROCEDURE — 72148 MRI LUMBAR SPINE W/O DYE: CPT | Mod: 26

## 2017-12-20 ENCOUNTER — APPOINTMENT (OUTPATIENT)
Dept: INTERNAL MEDICINE | Facility: CLINIC | Age: 57
End: 2017-12-20
Payer: MEDICAID

## 2017-12-20 VITALS
BODY MASS INDEX: 31.64 KG/M2 | WEIGHT: 173 LBS | DIASTOLIC BLOOD PRESSURE: 86 MMHG | SYSTOLIC BLOOD PRESSURE: 140 MMHG | HEART RATE: 82 BPM | OXYGEN SATURATION: 96 % | TEMPERATURE: 97.6 F | RESPIRATION RATE: 18 BRPM

## 2017-12-20 PROCEDURE — 99213 OFFICE O/P EST LOW 20 MIN: CPT

## 2018-01-04 ENCOUNTER — MEDICATION RENEWAL (OUTPATIENT)
Age: 58
End: 2018-01-04

## 2018-01-05 ENCOUNTER — MEDICATION RENEWAL (OUTPATIENT)
Age: 58
End: 2018-01-05

## 2018-01-08 ENCOUNTER — APPOINTMENT (OUTPATIENT)
Dept: INTERNAL MEDICINE | Facility: CLINIC | Age: 58
End: 2018-01-08
Payer: MEDICAID

## 2018-01-08 ENCOUNTER — FORM ENCOUNTER (OUTPATIENT)
Age: 58
End: 2018-01-08

## 2018-01-08 VITALS
BODY MASS INDEX: 32.76 KG/M2 | DIASTOLIC BLOOD PRESSURE: 90 MMHG | OXYGEN SATURATION: 98 % | SYSTOLIC BLOOD PRESSURE: 140 MMHG | WEIGHT: 178 LBS | TEMPERATURE: 98.3 F | HEART RATE: 88 BPM | HEIGHT: 62 IN

## 2018-01-08 DIAGNOSIS — N23 UNSPECIFIED RENAL COLIC: ICD-10-CM

## 2018-01-08 PROCEDURE — 99214 OFFICE O/P EST MOD 30 MIN: CPT | Mod: 25

## 2018-01-08 PROCEDURE — 81002 URINALYSIS NONAUTO W/O SCOPE: CPT

## 2018-01-08 PROCEDURE — 36415 COLL VENOUS BLD VENIPUNCTURE: CPT

## 2018-01-09 ENCOUNTER — APPOINTMENT (OUTPATIENT)
Dept: CT IMAGING | Facility: CLINIC | Age: 58
End: 2018-01-09
Payer: MEDICAID

## 2018-01-09 ENCOUNTER — OUTPATIENT (OUTPATIENT)
Dept: OUTPATIENT SERVICES | Facility: HOSPITAL | Age: 58
LOS: 1 days | End: 2018-01-09
Payer: COMMERCIAL

## 2018-01-09 DIAGNOSIS — Q99.8 OTHER SPECIFIED CHROMOSOME ABNORMALITIES: ICD-10-CM

## 2018-01-09 DIAGNOSIS — Z98.891 HISTORY OF UTERINE SCAR FROM PREVIOUS SURGERY: Chronic | ICD-10-CM

## 2018-01-09 LAB
BASOPHILS # BLD AUTO: 0.03 K/UL
BASOPHILS NFR BLD AUTO: 0.5 %
BILIRUB UR QL STRIP: NEGATIVE
CLARITY UR: CLEAR
COLLECTION METHOD: NORMAL
EOSINOPHIL # BLD AUTO: 0.33 K/UL
EOSINOPHIL NFR BLD AUTO: 5.3 %
GLUCOSE UR-MCNC: NORMAL
HCG UR QL: 0.2 EU/DL
HCT VFR BLD CALC: 40.3 %
HGB BLD-MCNC: 12.8 G/DL
HGB UR QL STRIP.AUTO: NEGATIVE
IMM GRANULOCYTES NFR BLD AUTO: 0 %
KETONES UR-MCNC: NEGATIVE
LEUKOCYTE ESTERASE UR QL STRIP: NEGATIVE
LYMPHOCYTES # BLD AUTO: 1.55 K/UL
LYMPHOCYTES NFR BLD AUTO: 24.8 %
MAN DIFF?: NORMAL
MCHC RBC-ENTMCNC: 31.8 GM/DL
MCHC RBC-ENTMCNC: 32 PG
MCV RBC AUTO: 100.8 FL
MONOCYTES # BLD AUTO: 0.73 K/UL
MONOCYTES NFR BLD AUTO: 11.7 %
NEUTROPHILS # BLD AUTO: 3.62 K/UL
NEUTROPHILS NFR BLD AUTO: 57.7 %
NITRITE UR QL STRIP: NEGATIVE
PH UR STRIP: 5.5
PLATELET # BLD AUTO: 278 K/UL
PROT UR STRIP-MCNC: NEGATIVE
RBC # BLD: 4 M/UL
RBC # FLD: 13.2 %
SP GR UR STRIP: 1.02
WBC # FLD AUTO: 6.26 K/UL

## 2018-01-09 PROCEDURE — 74178 CT ABD&PLV WO CNTR FLWD CNTR: CPT | Mod: 26

## 2018-01-09 PROCEDURE — 82565 ASSAY OF CREATININE: CPT

## 2018-01-09 PROCEDURE — 74178 CT ABD&PLV WO CNTR FLWD CNTR: CPT

## 2018-01-10 ENCOUNTER — APPOINTMENT (OUTPATIENT)
Dept: INTERNAL MEDICINE | Facility: CLINIC | Age: 58
End: 2018-01-10

## 2018-01-10 ENCOUNTER — CHART COPY (OUTPATIENT)
Age: 58
End: 2018-01-10

## 2018-01-10 LAB
ALBUMIN SERPL ELPH-MCNC: 4.4 G/DL
ALP BLD-CCNC: 97 U/L
ALT SERPL-CCNC: 19 U/L
AMYLASE/CREAT SERPL: 42 U/L
ANION GAP SERPL CALC-SCNC: 14 MMOL/L
AST SERPL-CCNC: 18 U/L
BILIRUB SERPL-MCNC: <0.2 MG/DL
BUN SERPL-MCNC: 20 MG/DL
CALCIUM SERPL-MCNC: 9.2 MG/DL
CHLORIDE SERPL-SCNC: 102 MMOL/L
CO2 SERPL-SCNC: 27 MMOL/L
CREAT SERPL-MCNC: 0.94 MG/DL
GLUCOSE SERPL-MCNC: 92 MG/DL
LPL SERPL-CCNC: 42 U/L
POTASSIUM SERPL-SCNC: 4.4 MMOL/L
PROT SERPL-MCNC: 7.1 G/DL
SODIUM SERPL-SCNC: 143 MMOL/L

## 2018-01-11 ENCOUNTER — CHART COPY (OUTPATIENT)
Age: 58
End: 2018-01-11

## 2018-01-13 LAB — BACTERIA UR CULT: NORMAL

## 2018-01-16 ENCOUNTER — FORM ENCOUNTER (OUTPATIENT)
Age: 58
End: 2018-01-16

## 2018-01-17 ENCOUNTER — APPOINTMENT (OUTPATIENT)
Dept: ULTRASOUND IMAGING | Facility: CLINIC | Age: 58
End: 2018-01-17
Payer: MEDICAID

## 2018-01-17 ENCOUNTER — APPOINTMENT (OUTPATIENT)
Dept: INTERNAL MEDICINE | Facility: CLINIC | Age: 58
End: 2018-01-17
Payer: MEDICAID

## 2018-01-17 ENCOUNTER — OUTPATIENT (OUTPATIENT)
Dept: OUTPATIENT SERVICES | Facility: HOSPITAL | Age: 58
LOS: 1 days | End: 2018-01-17
Payer: COMMERCIAL

## 2018-01-17 VITALS
HEIGHT: 62 IN | BODY MASS INDEX: 32.57 KG/M2 | DIASTOLIC BLOOD PRESSURE: 90 MMHG | SYSTOLIC BLOOD PRESSURE: 160 MMHG | WEIGHT: 177 LBS

## 2018-01-17 VITALS — HEART RATE: 97 BPM | OXYGEN SATURATION: 98 %

## 2018-01-17 DIAGNOSIS — Z98.891 HISTORY OF UTERINE SCAR FROM PREVIOUS SURGERY: Chronic | ICD-10-CM

## 2018-01-17 DIAGNOSIS — I82.441 ACUTE EMBOLISM AND THROMBOSIS OF RIGHT TIBIAL VEIN: ICD-10-CM

## 2018-01-17 PROCEDURE — 93971 EXTREMITY STUDY: CPT

## 2018-01-17 PROCEDURE — 93971 EXTREMITY STUDY: CPT | Mod: 26,RT

## 2018-01-17 PROCEDURE — 99213 OFFICE O/P EST LOW 20 MIN: CPT

## 2018-01-19 ENCOUNTER — APPOINTMENT (OUTPATIENT)
Dept: PULMONOLOGY | Facility: CLINIC | Age: 58
End: 2018-01-19
Payer: MEDICAID

## 2018-01-19 VITALS
DIASTOLIC BLOOD PRESSURE: 80 MMHG | SYSTOLIC BLOOD PRESSURE: 120 MMHG | BODY MASS INDEX: 32.01 KG/M2 | OXYGEN SATURATION: 97 % | HEART RATE: 62 BPM | WEIGHT: 175 LBS

## 2018-01-19 PROCEDURE — 94060 EVALUATION OF WHEEZING: CPT

## 2018-01-19 PROCEDURE — 99215 OFFICE O/P EST HI 40 MIN: CPT | Mod: 25

## 2018-01-19 PROCEDURE — 94664 DEMO&/EVAL PT USE INHALER: CPT | Mod: 59

## 2018-01-19 RX ORDER — LIDOCAINE 5% 700 MG/1
5 PATCH TOPICAL
Qty: 1 | Refills: 2 | Status: DISCONTINUED | COMMUNITY
Start: 2017-11-29 | End: 2018-01-19

## 2018-01-19 RX ORDER — CYANOCOBALAMIN 1000 UG/ML
1000 INJECTION INTRAMUSCULAR; SUBCUTANEOUS
Qty: 1 | Refills: 0 | Status: DISCONTINUED | COMMUNITY
Start: 2017-04-22 | End: 2018-01-19

## 2018-01-19 RX ORDER — CIPROFLOXACIN HYDROCHLORIDE 500 MG/1
500 TABLET, FILM COATED ORAL
Qty: 20 | Refills: 1 | Status: DISCONTINUED | COMMUNITY
Start: 2017-11-03 | End: 2018-01-19

## 2018-01-22 RX ORDER — TIOTROPIUM BROMIDE 18 UG/1
18 CAPSULE ORAL; RESPIRATORY (INHALATION) DAILY
Qty: 1 | Refills: 5 | Status: DISCONTINUED | COMMUNITY
Start: 2017-11-27 | End: 2018-01-22

## 2018-01-26 ENCOUNTER — APPOINTMENT (OUTPATIENT)
Dept: INTERNAL MEDICINE | Facility: CLINIC | Age: 58
End: 2018-01-26
Payer: MEDICAID

## 2018-01-26 VITALS
TEMPERATURE: 98.1 F | WEIGHT: 175 LBS | DIASTOLIC BLOOD PRESSURE: 90 MMHG | SYSTOLIC BLOOD PRESSURE: 148 MMHG | BODY MASS INDEX: 32.2 KG/M2 | HEIGHT: 62 IN | OXYGEN SATURATION: 96 % | HEART RATE: 60 BPM

## 2018-01-26 PROCEDURE — 99213 OFFICE O/P EST LOW 20 MIN: CPT

## 2018-01-31 ENCOUNTER — MEDICATION RENEWAL (OUTPATIENT)
Age: 58
End: 2018-01-31

## 2018-02-12 ENCOUNTER — MEDICATION RENEWAL (OUTPATIENT)
Age: 58
End: 2018-02-12

## 2018-02-14 ENCOUNTER — APPOINTMENT (OUTPATIENT)
Dept: PULMONOLOGY | Facility: CLINIC | Age: 58
End: 2018-02-14

## 2018-03-02 ENCOUNTER — MEDICATION RENEWAL (OUTPATIENT)
Age: 58
End: 2018-03-02

## 2018-03-13 RX ORDER — PREDNISONE 10 MG/1
10 TABLET ORAL
Qty: 30 | Refills: 6 | Status: DISCONTINUED | COMMUNITY
Start: 2018-01-19 | End: 2018-03-13

## 2018-03-13 RX ORDER — ALBUTEROL SULFATE 2.5 MG/3ML
(2.5 MG/3ML) SOLUTION RESPIRATORY (INHALATION)
Qty: 3 | Refills: 3 | Status: DISCONTINUED | COMMUNITY
Start: 2017-11-06 | End: 2018-03-13

## 2018-03-13 RX ORDER — AZITHROMYCIN 250 MG/1
250 TABLET, FILM COATED ORAL
Qty: 1 | Refills: 6 | Status: DISCONTINUED | COMMUNITY
Start: 2018-01-19 | End: 2018-03-13

## 2018-03-19 ENCOUNTER — APPOINTMENT (OUTPATIENT)
Dept: INTERNAL MEDICINE | Facility: CLINIC | Age: 58
End: 2018-03-19
Payer: MEDICAID

## 2018-03-19 ENCOUNTER — APPOINTMENT (OUTPATIENT)
Dept: INTERNAL MEDICINE | Facility: CLINIC | Age: 58
End: 2018-03-19

## 2018-03-19 VITALS — RESPIRATION RATE: 12 BRPM | DIASTOLIC BLOOD PRESSURE: 85 MMHG | HEART RATE: 72 BPM | SYSTOLIC BLOOD PRESSURE: 136 MMHG

## 2018-03-19 PROCEDURE — 99214 OFFICE O/P EST MOD 30 MIN: CPT

## 2018-03-22 ENCOUNTER — APPOINTMENT (OUTPATIENT)
Dept: PULMONOLOGY | Facility: CLINIC | Age: 58
End: 2018-03-22
Payer: MEDICAID

## 2018-03-22 ENCOUNTER — APPOINTMENT (OUTPATIENT)
Dept: PULMONOLOGY | Facility: CLINIC | Age: 58
End: 2018-03-22

## 2018-03-22 VITALS — DIASTOLIC BLOOD PRESSURE: 60 MMHG | OXYGEN SATURATION: 98 % | HEART RATE: 70 BPM | SYSTOLIC BLOOD PRESSURE: 128 MMHG

## 2018-03-22 VITALS — WEIGHT: 180 LBS | BODY MASS INDEX: 32.92 KG/M2

## 2018-03-22 PROCEDURE — 94664 DEMO&/EVAL PT USE INHALER: CPT | Mod: 59

## 2018-03-22 PROCEDURE — 94060 EVALUATION OF WHEEZING: CPT

## 2018-03-22 PROCEDURE — 99215 OFFICE O/P EST HI 40 MIN: CPT | Mod: 25

## 2018-03-24 ENCOUNTER — MEDICATION RENEWAL (OUTPATIENT)
Age: 58
End: 2018-03-24

## 2018-03-26 ENCOUNTER — OUTPATIENT (OUTPATIENT)
Dept: OUTPATIENT SERVICES | Facility: HOSPITAL | Age: 58
LOS: 1 days | End: 2018-03-26
Payer: COMMERCIAL

## 2018-03-26 VITALS
DIASTOLIC BLOOD PRESSURE: 87 MMHG | RESPIRATION RATE: 18 BRPM | SYSTOLIC BLOOD PRESSURE: 158 MMHG | HEIGHT: 63 IN | HEART RATE: 68 BPM | TEMPERATURE: 97 F | WEIGHT: 184.97 LBS

## 2018-03-26 DIAGNOSIS — J44.9 CHRONIC OBSTRUCTIVE PULMONARY DISEASE, UNSPECIFIED: ICD-10-CM

## 2018-03-26 DIAGNOSIS — Z01.818 ENCOUNTER FOR OTHER PREPROCEDURAL EXAMINATION: ICD-10-CM

## 2018-03-26 DIAGNOSIS — I10 ESSENTIAL (PRIMARY) HYPERTENSION: ICD-10-CM

## 2018-03-26 DIAGNOSIS — Z29.9 ENCOUNTER FOR PROPHYLACTIC MEASURES, UNSPECIFIED: ICD-10-CM

## 2018-03-26 DIAGNOSIS — Z98.890 OTHER SPECIFIED POSTPROCEDURAL STATES: Chronic | ICD-10-CM

## 2018-03-26 DIAGNOSIS — Z98.891 HISTORY OF UTERINE SCAR FROM PREVIOUS SURGERY: Chronic | ICD-10-CM

## 2018-03-26 DIAGNOSIS — D17.9 BENIGN LIPOMATOUS NEOPLASM, UNSPECIFIED: ICD-10-CM

## 2018-03-26 DIAGNOSIS — G47.33 OBSTRUCTIVE SLEEP APNEA (ADULT) (PEDIATRIC): ICD-10-CM

## 2018-03-26 DIAGNOSIS — K46.9 UNSPECIFIED ABDOMINAL HERNIA WITHOUT OBSTRUCTION OR GANGRENE: ICD-10-CM

## 2018-03-26 DIAGNOSIS — K80.20 CALCULUS OF GALLBLADDER WITHOUT CHOLECYSTITIS WITHOUT OBSTRUCTION: ICD-10-CM

## 2018-03-26 LAB
ALBUMIN SERPL ELPH-MCNC: 4.2 G/DL — SIGNIFICANT CHANGE UP (ref 3.3–5.2)
ALP SERPL-CCNC: 98 U/L — SIGNIFICANT CHANGE UP (ref 40–120)
ALT FLD-CCNC: 26 U/L — SIGNIFICANT CHANGE UP
AMYLASE P1 CFR SERPL: 50 U/L — SIGNIFICANT CHANGE UP (ref 36–128)
ANION GAP SERPL CALC-SCNC: 11 MMOL/L — SIGNIFICANT CHANGE UP (ref 5–17)
APTT BLD: 31.5 SEC — SIGNIFICANT CHANGE UP (ref 27.5–37.4)
AST SERPL-CCNC: 25 U/L — SIGNIFICANT CHANGE UP
BASOPHILS # BLD AUTO: 0 K/UL — SIGNIFICANT CHANGE UP (ref 0–0.2)
BASOPHILS NFR BLD AUTO: 0.4 % — SIGNIFICANT CHANGE UP (ref 0–2)
BILIRUB SERPL-MCNC: <0.2 MG/DL — LOW (ref 0.4–2)
BLD GP AB SCN SERPL QL: SIGNIFICANT CHANGE UP
BUN SERPL-MCNC: 19 MG/DL — SIGNIFICANT CHANGE UP (ref 8–20)
CALCIUM SERPL-MCNC: 9.1 MG/DL — SIGNIFICANT CHANGE UP (ref 8.6–10.2)
CHLORIDE SERPL-SCNC: 103 MMOL/L — SIGNIFICANT CHANGE UP (ref 98–107)
CO2 SERPL-SCNC: 27 MMOL/L — SIGNIFICANT CHANGE UP (ref 22–29)
CREAT SERPL-MCNC: 0.78 MG/DL — SIGNIFICANT CHANGE UP (ref 0.5–1.3)
EOSINOPHIL # BLD AUTO: 0.1 K/UL — SIGNIFICANT CHANGE UP (ref 0–0.5)
EOSINOPHIL NFR BLD AUTO: 2.9 % — SIGNIFICANT CHANGE UP (ref 0–6)
GLUCOSE SERPL-MCNC: 92 MG/DL — SIGNIFICANT CHANGE UP (ref 70–115)
HCT VFR BLD CALC: 42.1 % — SIGNIFICANT CHANGE UP (ref 37–47)
HGB BLD-MCNC: 13.9 G/DL — SIGNIFICANT CHANGE UP (ref 12–16)
INR BLD: 0.88 RATIO — SIGNIFICANT CHANGE UP (ref 0.88–1.16)
LIDOCAIN IGE QN: 33 U/L — SIGNIFICANT CHANGE UP (ref 22–51)
LYMPHOCYTES # BLD AUTO: 1.6 K/UL — SIGNIFICANT CHANGE UP (ref 1–4.8)
LYMPHOCYTES # BLD AUTO: 33.5 % — SIGNIFICANT CHANGE UP (ref 20–55)
MCHC RBC-ENTMCNC: 32.3 PG — HIGH (ref 27–31)
MCHC RBC-ENTMCNC: 33 G/DL — SIGNIFICANT CHANGE UP (ref 32–36)
MCV RBC AUTO: 97.9 FL — SIGNIFICANT CHANGE UP (ref 81–99)
MONOCYTES # BLD AUTO: 0.3 K/UL — SIGNIFICANT CHANGE UP (ref 0–0.8)
MONOCYTES NFR BLD AUTO: 6.7 % — SIGNIFICANT CHANGE UP (ref 3–10)
NEUTROPHILS # BLD AUTO: 2.7 K/UL — SIGNIFICANT CHANGE UP (ref 1.8–8)
NEUTROPHILS NFR BLD AUTO: 56.5 % — SIGNIFICANT CHANGE UP (ref 37–73)
PLATELET # BLD AUTO: 309 K/UL — SIGNIFICANT CHANGE UP (ref 150–400)
POTASSIUM SERPL-MCNC: 4.6 MMOL/L — SIGNIFICANT CHANGE UP (ref 3.5–5.3)
POTASSIUM SERPL-SCNC: 4.6 MMOL/L — SIGNIFICANT CHANGE UP (ref 3.5–5.3)
PROT SERPL-MCNC: 7.3 G/DL — SIGNIFICANT CHANGE UP (ref 6.6–8.7)
PROTHROM AB SERPL-ACNC: 9.7 SEC — LOW (ref 9.8–12.7)
RBC # BLD: 4.3 M/UL — LOW (ref 4.4–5.2)
RBC # FLD: 12.9 % — SIGNIFICANT CHANGE UP (ref 11–15.6)
SODIUM SERPL-SCNC: 141 MMOL/L — SIGNIFICANT CHANGE UP (ref 135–145)
TYPE + AB SCN PNL BLD: SIGNIFICANT CHANGE UP
WBC # BLD: 4.8 K/UL — SIGNIFICANT CHANGE UP (ref 4.8–10.8)
WBC # FLD AUTO: 4.8 K/UL — SIGNIFICANT CHANGE UP (ref 4.8–10.8)

## 2018-03-26 PROCEDURE — 85610 PROTHROMBIN TIME: CPT

## 2018-03-26 PROCEDURE — 86900 BLOOD TYPING SEROLOGIC ABO: CPT

## 2018-03-26 PROCEDURE — 93005 ELECTROCARDIOGRAM TRACING: CPT

## 2018-03-26 PROCEDURE — 36415 COLL VENOUS BLD VENIPUNCTURE: CPT

## 2018-03-26 PROCEDURE — 85027 COMPLETE CBC AUTOMATED: CPT

## 2018-03-26 PROCEDURE — 93010 ELECTROCARDIOGRAM REPORT: CPT

## 2018-03-26 PROCEDURE — 86850 RBC ANTIBODY SCREEN: CPT

## 2018-03-26 PROCEDURE — 80053 COMPREHEN METABOLIC PANEL: CPT

## 2018-03-26 PROCEDURE — 86901 BLOOD TYPING SEROLOGIC RH(D): CPT

## 2018-03-26 PROCEDURE — 85730 THROMBOPLASTIN TIME PARTIAL: CPT

## 2018-03-26 PROCEDURE — 82150 ASSAY OF AMYLASE: CPT

## 2018-03-26 PROCEDURE — G0463: CPT

## 2018-03-26 PROCEDURE — 83690 ASSAY OF LIPASE: CPT

## 2018-03-26 RX ORDER — LOSARTAN POTASSIUM 100 MG/1
1 TABLET, FILM COATED ORAL
Qty: 0 | Refills: 0 | COMMUNITY

## 2018-03-26 RX ORDER — PREGABALIN 225 MG/1
1 CAPSULE ORAL
Qty: 0 | Refills: 0 | COMMUNITY

## 2018-03-26 RX ORDER — FLUTICASONE PROPIONATE AND SALMETEROL 50; 250 UG/1; UG/1
1 POWDER ORAL; RESPIRATORY (INHALATION)
Qty: 0 | Refills: 0 | COMMUNITY

## 2018-03-26 RX ORDER — TRAZODONE HCL 50 MG
0.5 TABLET ORAL
Qty: 0 | Refills: 0 | COMMUNITY

## 2018-03-26 NOTE — H&P PST ADULT - HISTORY OF PRESENT ILLNESS
57 year old female presents with c/o gallstones since 2016 , c/o bloating and abdominal discomfort . Pt c/o hernia to abdomen for several years , increased in size, occasional discomfort . Left abdominal /hip area Lipoma for years that has become larger . Pain at times.

## 2018-03-26 NOTE — H&P PST ADULT - ASSESSMENT
57 year old female with history of bipolar , htn and copd. presents for hernia repair removal of gallstones and left abdominal lipoma removal. 57 year old female with history of bipolar , htn and copd. presents for hernia repair removal of gallstones and left abdominal lipoma removal.  CAPRINI SCORE [CLOT]    AGE RELATED RISK FACTORS                                                       MOBILITY RELATED FACTORS  [x ] Age 41-60 years                                            (1 Point)                  [ ] Bed rest                                                        (1 Point)  [ ] Age: 61-74 years                                           (2 Points)                 [ ] Plaster cast                                                   (2 Points)  [ ] Age= 75 years                                              (3 Points)                 [ ] Bed bound for more than 72 hours                 (2 Points)    DISEASE RELATED RISK FACTORS                                               GENDER SPECIFIC FACTORS  [ ] Edema in the lower extremities                       (1 Point)                  [ ] Pregnancy                                                     (1 Point)  [x ] Varicose veins                                               (1 Point)                  [ ] Post-partum < 6 weeks                                   (1 Point)             [ x] BMI > 25 Kg/m2                                            (1 Point)                  [ ] Hormonal therapy  or oral contraception          (1 Point)                 [ ] Sepsis (in the previous month)                        (1 Point)                  [ ] History of pregnancy complications                 (1 point)  [ ] Pneumonia or serious lung disease                                               [ ] Unexplained or recurrent                     (1 Point)           (in the previous month)                               (1 Point)  [ x] Abnormal pulmonary function test                     (1 Point)                 SURGERY RELATED RISK FACTORS  [ ] Acute myocardial infarction                              (1 Point)                 [ ]  Section                                             (1 Point)  [ ] Congestive heart failure (in the previous month)  (1 Point)               [ ] Minor surgery                                                  (1 Point)   [ ] Inflammatory bowel disease                             (1 Point)                 [ ] Arthroscopic surgery                                        (2 Points)  [ ] Central venous access                                      (2 Points)                [ x] General surgery lasting more than 45 minutes   (2 Points)       [ ] Stroke (in the previous month)                          (5 Points)               [ ] Elective arthroplasty                                         (5 Points)                                                                                                                                               HEMATOLOGY RELATED FACTORS                                                 TRAUMA RELATED RISK FACTORS  [ ] Prior episodes of VTE                                     (3 Points)                 [ ] Fracture of the hip, pelvis, or leg                       (5 Points)  [ ] Positive family history for VTE                         (3 Points)                 [ ] Acute spinal cord injury (in the previous month)  (5 Points)  [ ] Prothrombin 54900 A                                     (3 Points)                 [ ] Paralysis  (less than 1 month)                             (5 Points)  [ ] Factor V Leiden                                             (3 Points)                  [ ] Multiple Trauma within 1 month                        (5 Points)  [ ] Lupus anticoagulants                                     (3 Points)                                                           [ ] Anticardiolipin antibodies                               (3 Points)                                                       [ ] High homocysteine in the blood                      (3 Points)                                             [ ] Other congenital or acquired thrombophilia      (3 Points)                                                [ ] Heparin induced thrombocytopenia                  (3 Points)                                          Total Score [         6]

## 2018-03-26 NOTE — H&P PST ADULT - GASTROINTESTINAL COMMENTS
hernia, gallstones abd discomfort lower mid abdominal hernia , left hip abdominal region mass/ lipoma non tender baseball size

## 2018-03-26 NOTE — H&P PST ADULT - FAMILY HISTORY
Father  Still living? No  Family history of colon cancer, Age at diagnosis: Age Unknown     Mother  Still living? Yes, Estimated age: 81-90  Family history of early CAD, Age at diagnosis: Age Unknown  Family history of Alzheimer's disease, Age at diagnosis: Age Unknown     Sibling  Still living? Yes, Estimated age: 61-70  Family history of bipolar disorder, Age at diagnosis: Age Unknown

## 2018-03-26 NOTE — ASU PATIENT PROFILE, ADULT - CONTRAINDICATED REASON
telephone interview - discussed risk via phone & to contact her primary regarding any need for medication or further help

## 2018-03-26 NOTE — H&P PST ADULT - PMH
Anxiety    Asthma    Bipolar 1 disorder  no therapy  Chest pain  with anxiety  COPD (chronic obstructive pulmonary disease)  emphysema  Depressed    Diverticulosis    Gall bladder disease    Hypertension    Kidney stone    Lipoma  left abdomen / hip  Lipoma of abdominal wall  left lateral  JAGDISH (obstructive sleep apnea)  mild, no treatment

## 2018-03-28 ENCOUNTER — MEDICATION RENEWAL (OUTPATIENT)
Age: 58
End: 2018-03-28

## 2018-03-30 ENCOUNTER — APPOINTMENT (OUTPATIENT)
Dept: INTERNAL MEDICINE | Facility: CLINIC | Age: 58
End: 2018-03-30

## 2018-04-04 ENCOUNTER — APPOINTMENT (OUTPATIENT)
Dept: INTERNAL MEDICINE | Facility: CLINIC | Age: 58
End: 2018-04-04

## 2018-04-14 ENCOUNTER — MEDICATION RENEWAL (OUTPATIENT)
Age: 58
End: 2018-04-14

## 2018-04-16 ENCOUNTER — APPOINTMENT (OUTPATIENT)
Dept: PULMONOLOGY | Facility: CLINIC | Age: 58
End: 2018-04-16

## 2018-04-18 ENCOUNTER — MEDICATION RENEWAL (OUTPATIENT)
Age: 58
End: 2018-04-18

## 2018-04-25 ENCOUNTER — MEDICATION RENEWAL (OUTPATIENT)
Age: 58
End: 2018-04-25

## 2018-05-03 ENCOUNTER — MEDICATION RENEWAL (OUTPATIENT)
Age: 58
End: 2018-05-03

## 2018-05-03 DIAGNOSIS — T78.40XA ALLERGY, UNSPECIFIED, INITIAL ENCOUNTER: ICD-10-CM

## 2018-05-04 ENCOUNTER — OUTPATIENT (OUTPATIENT)
Dept: OUTPATIENT SERVICES | Facility: HOSPITAL | Age: 58
LOS: 1 days | End: 2018-05-04
Payer: COMMERCIAL

## 2018-05-04 VITALS
HEART RATE: 80 BPM | HEIGHT: 63 IN | DIASTOLIC BLOOD PRESSURE: 73 MMHG | TEMPERATURE: 98 F | RESPIRATION RATE: 16 BRPM | WEIGHT: 180.78 LBS | SYSTOLIC BLOOD PRESSURE: 148 MMHG

## 2018-05-04 DIAGNOSIS — Z98.890 OTHER SPECIFIED POSTPROCEDURAL STATES: Chronic | ICD-10-CM

## 2018-05-04 DIAGNOSIS — Z98.891 HISTORY OF UTERINE SCAR FROM PREVIOUS SURGERY: Chronic | ICD-10-CM

## 2018-05-04 DIAGNOSIS — Z01.818 ENCOUNTER FOR OTHER PREPROCEDURAL EXAMINATION: ICD-10-CM

## 2018-05-04 LAB
ALBUMIN SERPL ELPH-MCNC: 4.2 G/DL — SIGNIFICANT CHANGE UP (ref 3.3–5.2)
ALP SERPL-CCNC: 106 U/L — SIGNIFICANT CHANGE UP (ref 40–120)
ALT FLD-CCNC: 14 U/L — SIGNIFICANT CHANGE UP
AMYLASE P1 CFR SERPL: 40 U/L — SIGNIFICANT CHANGE UP (ref 36–128)
AMYLASE P1 CFR SERPL: 41 U/L — SIGNIFICANT CHANGE UP (ref 36–128)
ANION GAP SERPL CALC-SCNC: 13 MMOL/L — SIGNIFICANT CHANGE UP (ref 5–17)
APTT BLD: 30.9 SEC — SIGNIFICANT CHANGE UP (ref 27.5–37.4)
AST SERPL-CCNC: 17 U/L — SIGNIFICANT CHANGE UP
BASOPHILS # BLD AUTO: 0 K/UL — SIGNIFICANT CHANGE UP (ref 0–0.2)
BASOPHILS NFR BLD AUTO: 0.6 % — SIGNIFICANT CHANGE UP (ref 0–2)
BILIRUB SERPL-MCNC: 0.3 MG/DL — LOW (ref 0.4–2)
BUN SERPL-MCNC: 32 MG/DL — HIGH (ref 8–20)
CALCIUM SERPL-MCNC: 9.5 MG/DL — SIGNIFICANT CHANGE UP (ref 8.6–10.2)
CHLORIDE SERPL-SCNC: 101 MMOL/L — SIGNIFICANT CHANGE UP (ref 98–107)
CO2 SERPL-SCNC: 27 MMOL/L — SIGNIFICANT CHANGE UP (ref 22–29)
CREAT SERPL-MCNC: 0.98 MG/DL — SIGNIFICANT CHANGE UP (ref 0.5–1.3)
EOSINOPHIL # BLD AUTO: 0.3 K/UL — SIGNIFICANT CHANGE UP (ref 0–0.5)
EOSINOPHIL NFR BLD AUTO: 5.1 % — SIGNIFICANT CHANGE UP (ref 0–6)
GLUCOSE SERPL-MCNC: 108 MG/DL — SIGNIFICANT CHANGE UP (ref 70–115)
HCT VFR BLD CALC: 40.4 % — SIGNIFICANT CHANGE UP (ref 37–47)
HGB BLD-MCNC: 13.2 G/DL — SIGNIFICANT CHANGE UP (ref 12–16)
INR BLD: 0.88 RATIO — SIGNIFICANT CHANGE UP (ref 0.88–1.16)
LIDOCAIN IGE QN: 32 U/L — SIGNIFICANT CHANGE UP (ref 22–51)
LYMPHOCYTES # BLD AUTO: 1.7 K/UL — SIGNIFICANT CHANGE UP (ref 1–4.8)
LYMPHOCYTES # BLD AUTO: 31.8 % — SIGNIFICANT CHANGE UP (ref 20–55)
MCHC RBC-ENTMCNC: 31.7 PG — HIGH (ref 27–31)
MCHC RBC-ENTMCNC: 32.7 G/DL — SIGNIFICANT CHANGE UP (ref 32–36)
MCV RBC AUTO: 96.9 FL — SIGNIFICANT CHANGE UP (ref 81–99)
MONOCYTES # BLD AUTO: 0.6 K/UL — SIGNIFICANT CHANGE UP (ref 0–0.8)
MONOCYTES NFR BLD AUTO: 11.7 % — HIGH (ref 3–10)
NEUTROPHILS # BLD AUTO: 2.7 K/UL — SIGNIFICANT CHANGE UP (ref 1.8–8)
NEUTROPHILS NFR BLD AUTO: 50.8 % — SIGNIFICANT CHANGE UP (ref 37–73)
PLATELET # BLD AUTO: 311 K/UL — SIGNIFICANT CHANGE UP (ref 150–400)
POTASSIUM SERPL-MCNC: 4.2 MMOL/L — SIGNIFICANT CHANGE UP (ref 3.5–5.3)
POTASSIUM SERPL-SCNC: 4.2 MMOL/L — SIGNIFICANT CHANGE UP (ref 3.5–5.3)
PROT SERPL-MCNC: 7.4 G/DL — SIGNIFICANT CHANGE UP (ref 6.6–8.7)
PROTHROM AB SERPL-ACNC: 9.7 SEC — LOW (ref 9.8–12.7)
RBC # BLD: 4.17 M/UL — LOW (ref 4.4–5.2)
RBC # FLD: 13.1 % — SIGNIFICANT CHANGE UP (ref 11–15.6)
SODIUM SERPL-SCNC: 141 MMOL/L — SIGNIFICANT CHANGE UP (ref 135–145)
WBC # BLD: 5.3 K/UL — SIGNIFICANT CHANGE UP (ref 4.8–10.8)
WBC # FLD AUTO: 5.3 K/UL — SIGNIFICANT CHANGE UP (ref 4.8–10.8)

## 2018-05-04 PROCEDURE — 36415 COLL VENOUS BLD VENIPUNCTURE: CPT

## 2018-05-04 PROCEDURE — 85730 THROMBOPLASTIN TIME PARTIAL: CPT

## 2018-05-04 PROCEDURE — 85027 COMPLETE CBC AUTOMATED: CPT

## 2018-05-04 PROCEDURE — 82150 ASSAY OF AMYLASE: CPT

## 2018-05-04 PROCEDURE — G0463: CPT

## 2018-05-04 PROCEDURE — 83690 ASSAY OF LIPASE: CPT

## 2018-05-04 PROCEDURE — 85610 PROTHROMBIN TIME: CPT

## 2018-05-04 PROCEDURE — 80053 COMPREHEN METABOLIC PANEL: CPT

## 2018-05-04 NOTE — ASU PATIENT PROFILE, ADULT - PRO TOBACCO QUIT READY
10/27: doing ok: s/p bronchoscopy: totally clear bronch with clear secretions: no purulent secretions seen: If nephology determines that she is not fluid overloaded: then consideration should be given to vats biopsy!!  10/28 stable. alleviated continue Duoneb, Symbicort, Spiriva  10/29 stable. diuresed yesterday. Continue current management: cont IV lasix : rpt chest x-ray tomorrow  10/30; still fluid overload: cont iv lasix : the bal has been negative for now: finish antibiotics today  10/31: chest x-ray today: discussed with dr Choe: he will valuate the case today and let me know: feel she is still fluid overloaded  11/1: had a detailed discussion wit Dr Choe yesterday: He also agrees that pt is fluid overloaded and dialysis being planned: discussed with Dr Jeronimo, to review the patients clinical history and consideration for VATS:  Pt should be totally optimized from metabolic point of view as well as fluid over load point of view before we will consider VATS biopsy and it may take two to three weeks: Per renal she is not metabolically ready for the surgical biopsy yet: Bronchoscopy is negative so I s cyto: if after adequate diuresis, the patient still shows persistent infiltrates on chest ct scan then will proceed with biopsy  10/2: rheumatology input noted: pt getting aggressive management of fluid overload and metabolically being optimized in the event VATS biopsy of lung is needed.  10/3: cont aggressive fluid management for now  11/4 alleviated. continue monitor I&O keep euvolemic: her cough and sob IS MUCH BETTER: She has overall improvement inher clinical condition following initiation of dialysis  11/5 stable. continue HD,  11/6: plan is to adequately diurese the pt in two weeks time, repeat ct scan chest and if the infiltrates still persist: do VATS biopsy then!!  11/7: pt is feeling better: no sob, no cough: everyday she is saying she is feeling better since the initiation of dialysis  11/8: everyday she is feelingbetter: not sob , no cough , lo tapering down/thinking about quitting

## 2018-05-04 NOTE — H&P PST ADULT - ASSESSMENT
57 year old female with history of bipolar , htn and copd. presents for hernia repair removal of gallstones and left abdominal lipoma removal.  CAPRINI SCORE [CLOT]    AGE RELATED RISK FACTORS                                                       MOBILITY RELATED FACTORS  [x ] Age 41-60 years                                            (1 Point)                  [ ] Bed rest                                                        (1 Point)  [ ] Age: 61-74 years                                           (2 Points)                 [ ] Plaster cast                                                   (2 Points)  [ ] Age= 75 years                                              (3 Points)                 [ ] Bed bound for more than 72 hours                 (2 Points)    DISEASE RELATED RISK FACTORS                                               GENDER SPECIFIC FACTORS  [ ] Edema in the lower extremities                       (1 Point)                  [ ] Pregnancy                                                     (1 Point)  [x ] Varicose veins                                               (1 Point)                  [ ] Post-partum < 6 weeks                                   (1 Point)             [ x] BMI > 25 Kg/m2                                            (1 Point)                  [ ] Hormonal therapy  or oral contraception          (1 Point)                 [ ] Sepsis (in the previous month)                        (1 Point)                  [ ] History of pregnancy complications                 (1 point)  [ ] Pneumonia or serious lung disease                                               [ ] Unexplained or recurrent                     (1 Point)           (in the previous month)                               (1 Point)  [ x] Abnormal pulmonary function test                     (1 Point)                 SURGERY RELATED RISK FACTORS  [ ] Acute myocardial infarction                              (1 Point)                 [ ]  Section                                             (1 Point)  [ ] Congestive heart failure (in the previous month)  (1 Point)               [ ] Minor surgery                                                  (1 Point)   [ ] Inflammatory bowel disease                             (1 Point)                 [ ] Arthroscopic surgery                                        (2 Points)  [ ] Central venous access                                      (2 Points)                [ x] General surgery lasting more than 45 minutes   (2 Points)       [ ] Stroke (in the previous month)                          (5 Points)               [ ] Elective arthroplasty                                         (5 Points)                                                                                                                                               HEMATOLOGY RELATED FACTORS                                                 TRAUMA RELATED RISK FACTORS  [ ] Prior episodes of VTE                                     (3 Points)                 [ ] Fracture of the hip, pelvis, or leg                       (5 Points)  [ ] Positive family history for VTE                         (3 Points)                 [ ] Acute spinal cord injury (in the previous month)  (5 Points)  [ ] Prothrombin 09527 A                                     (3 Points)                 [ ] Paralysis  (less than 1 month)                             (5 Points)  [ ] Factor V Leiden                                             (3 Points)                  [ ] Multiple Trauma within 1 month                        (5 Points)  [ ] Lupus anticoagulants                                     (3 Points)                                                           [ ] Anticardiolipin antibodies                               (3 Points)                                                       [ ] High homocysteine in the blood                      (3 Points)                                             [ ] Other congenital or acquired thrombophilia      (3 Points)                                                [ ] Heparin induced thrombocytopenia                  (3 Points)                                          Total Score [         6]

## 2018-05-08 ENCOUNTER — APPOINTMENT (OUTPATIENT)
Dept: PULMONOLOGY | Facility: CLINIC | Age: 58
End: 2018-05-08
Payer: MEDICAID

## 2018-05-08 VITALS
HEART RATE: 74 BPM | SYSTOLIC BLOOD PRESSURE: 130 MMHG | BODY MASS INDEX: 33.65 KG/M2 | HEIGHT: 62 IN | OXYGEN SATURATION: 98 % | DIASTOLIC BLOOD PRESSURE: 80 MMHG

## 2018-05-08 VITALS — HEIGHT: 62 IN | WEIGHT: 184 LBS | BODY MASS INDEX: 33.86 KG/M2

## 2018-05-08 VITALS — RESPIRATION RATE: 16 BRPM

## 2018-05-08 PROCEDURE — 94729 DIFFUSING CAPACITY: CPT

## 2018-05-08 PROCEDURE — 85018 HEMOGLOBIN: CPT | Mod: QW

## 2018-05-08 PROCEDURE — 94010 BREATHING CAPACITY TEST: CPT

## 2018-05-08 PROCEDURE — 94727 GAS DIL/WSHOT DETER LNG VOL: CPT

## 2018-05-08 PROCEDURE — 99215 OFFICE O/P EST HI 40 MIN: CPT | Mod: 25

## 2018-05-08 RX ORDER — AZITHROMYCIN DIHYDRATE 250 MG/1
250 TABLET, FILM COATED ORAL
Qty: 1 | Refills: 0 | Status: DISCONTINUED | COMMUNITY
Start: 2018-03-19 | End: 2018-05-08

## 2018-05-14 ENCOUNTER — APPOINTMENT (OUTPATIENT)
Dept: INTERNAL MEDICINE | Facility: CLINIC | Age: 58
End: 2018-05-14

## 2018-05-16 ENCOUNTER — APPOINTMENT (OUTPATIENT)
Dept: INTERNAL MEDICINE | Facility: CLINIC | Age: 58
End: 2018-05-16
Payer: MEDICAID

## 2018-05-16 VITALS — WEIGHT: 185 LBS | HEIGHT: 62 IN | BODY MASS INDEX: 34.04 KG/M2

## 2018-05-16 PROCEDURE — 99214 OFFICE O/P EST MOD 30 MIN: CPT | Mod: 25

## 2018-05-16 PROCEDURE — 36415 COLL VENOUS BLD VENIPUNCTURE: CPT

## 2018-05-17 LAB
ALBUMIN SERPL ELPH-MCNC: 4.1 G/DL
ALP BLD-CCNC: 119 U/L
ALT SERPL-CCNC: 18 U/L
AST SERPL-CCNC: 18 U/L
BILIRUB DIRECT SERPL-MCNC: 0 MG/DL
BILIRUB INDIRECT SERPL-MCNC: NORMAL
BILIRUB SERPL-MCNC: <0.2 MG/DL
PROT SERPL-MCNC: 7.1 G/DL

## 2018-05-22 ENCOUNTER — MEDICATION RENEWAL (OUTPATIENT)
Age: 58
End: 2018-05-22

## 2018-05-29 ENCOUNTER — APPOINTMENT (OUTPATIENT)
Dept: INTERNAL MEDICINE | Facility: CLINIC | Age: 58
End: 2018-05-29
Payer: MEDICAID

## 2018-05-29 VITALS
TEMPERATURE: 97.9 F | HEART RATE: 75 BPM | OXYGEN SATURATION: 98 % | HEIGHT: 62 IN | DIASTOLIC BLOOD PRESSURE: 86 MMHG | BODY MASS INDEX: 34.41 KG/M2 | WEIGHT: 187 LBS | SYSTOLIC BLOOD PRESSURE: 136 MMHG

## 2018-05-29 DIAGNOSIS — D53.1 OTHER MEGALOBLASTIC ANEMIAS, NOT ELSEWHERE CLASSIFIED: ICD-10-CM

## 2018-05-29 DIAGNOSIS — T14.8XXA OTHER INJURY OF UNSPECIFIED BODY REGION, INITIAL ENCOUNTER: ICD-10-CM

## 2018-05-29 PROCEDURE — 99214 OFFICE O/P EST MOD 30 MIN: CPT

## 2018-06-01 ENCOUNTER — OUTPATIENT (OUTPATIENT)
Dept: OUTPATIENT SERVICES | Facility: HOSPITAL | Age: 58
LOS: 1 days | End: 2018-06-01
Payer: MEDICAID

## 2018-06-01 ENCOUNTER — APPOINTMENT (OUTPATIENT)
Dept: PULMONOLOGY | Facility: CLINIC | Age: 58
End: 2018-06-01
Payer: MEDICAID

## 2018-06-01 VITALS
DIASTOLIC BLOOD PRESSURE: 80 MMHG | SYSTOLIC BLOOD PRESSURE: 140 MMHG | WEIGHT: 190 LBS | BODY MASS INDEX: 34.96 KG/M2 | HEART RATE: 74 BPM | OXYGEN SATURATION: 98 % | HEIGHT: 62 IN

## 2018-06-01 DIAGNOSIS — Z98.891 HISTORY OF UTERINE SCAR FROM PREVIOUS SURGERY: Chronic | ICD-10-CM

## 2018-06-01 DIAGNOSIS — Z98.890 OTHER SPECIFIED POSTPROCEDURAL STATES: Chronic | ICD-10-CM

## 2018-06-01 PROCEDURE — G9001: CPT

## 2018-06-01 PROCEDURE — 99214 OFFICE O/P EST MOD 30 MIN: CPT | Mod: 25

## 2018-06-01 PROCEDURE — 99406 BEHAV CHNG SMOKING 3-10 MIN: CPT

## 2018-06-04 DIAGNOSIS — R69 ILLNESS, UNSPECIFIED: ICD-10-CM

## 2018-06-06 NOTE — ASU PATIENT PROFILE, ADULT - NSALCOHOLFREQ_GEN_A_CORE_SD
Notification Instructions: Patient will be notified of biopsy results. However, patient instructed to call the office if not contacted within 2 weeks. 2-4 times/mo

## 2018-06-12 ENCOUNTER — MEDICATION RENEWAL (OUTPATIENT)
Age: 58
End: 2018-06-12

## 2018-06-13 ENCOUNTER — OUTPATIENT (OUTPATIENT)
Dept: OUTPATIENT SERVICES | Facility: HOSPITAL | Age: 58
LOS: 1 days | End: 2018-06-13
Payer: COMMERCIAL

## 2018-06-13 DIAGNOSIS — G47.30 SLEEP APNEA, UNSPECIFIED: ICD-10-CM

## 2018-06-13 DIAGNOSIS — Z98.890 OTHER SPECIFIED POSTPROCEDURAL STATES: Chronic | ICD-10-CM

## 2018-06-13 DIAGNOSIS — Z98.891 HISTORY OF UTERINE SCAR FROM PREVIOUS SURGERY: Chronic | ICD-10-CM

## 2018-06-13 PROCEDURE — 95806 SLEEP STUDY UNATT&RESP EFFT: CPT | Mod: 26

## 2018-06-13 PROCEDURE — G0399: CPT

## 2018-06-13 PROCEDURE — 95806 SLEEP STUDY UNATT&RESP EFFT: CPT

## 2018-06-18 ENCOUNTER — RX RENEWAL (OUTPATIENT)
Age: 58
End: 2018-06-18

## 2018-06-18 ENCOUNTER — MEDICATION RENEWAL (OUTPATIENT)
Age: 58
End: 2018-06-18

## 2018-06-19 ENCOUNTER — MEDICATION RENEWAL (OUTPATIENT)
Age: 58
End: 2018-06-19

## 2018-06-20 ENCOUNTER — APPOINTMENT (OUTPATIENT)
Dept: INTERNAL MEDICINE | Facility: CLINIC | Age: 58
End: 2018-06-20
Payer: MEDICAID

## 2018-06-20 VITALS
BODY MASS INDEX: 34.96 KG/M2 | DIASTOLIC BLOOD PRESSURE: 82 MMHG | OXYGEN SATURATION: 97 % | SYSTOLIC BLOOD PRESSURE: 140 MMHG | HEART RATE: 79 BPM | HEIGHT: 62 IN | WEIGHT: 190 LBS | TEMPERATURE: 98.1 F

## 2018-06-20 PROCEDURE — 99215 OFFICE O/P EST HI 40 MIN: CPT

## 2018-06-20 RX ORDER — NICOTINE POLACRILEX 2 MG/1
2 GUM, CHEWING BUCCAL
Qty: 100 | Refills: 0 | Status: COMPLETED | COMMUNITY
Start: 2018-04-25

## 2018-06-20 NOTE — PHYSICAL EXAM
[No Acute Distress] : no acute distress [Normal Sclera/Conjunctiva] : normal sclera/conjunctiva [Normal Outer Ear/Nose] : the outer ears and nose were normal in appearance [No JVD] : no jugular venous distention [No Respiratory Distress] : no respiratory distress  [Normal Rate] : normal rate  [de-identified] : large abdominal hernia

## 2018-06-20 NOTE — HISTORY OF PRESENT ILLNESS
[FreeTextEntry1] : f/u to nicotine abuse, JAGDISH and abdominal discomfort. [de-identified] : Pt reports that she wants to stop smoking and she is interested in trying Chantix.\par She has been getting sleep studies and is working on the testing for the use if the BIPAP.\par She is still having problems with the hernia and will be seeing Dr Farias on Friday.

## 2018-07-01 ENCOUNTER — OUTPATIENT (OUTPATIENT)
Dept: OUTPATIENT SERVICES | Facility: HOSPITAL | Age: 58
LOS: 1 days | End: 2018-07-01
Payer: COMMERCIAL

## 2018-07-01 DIAGNOSIS — Z98.890 OTHER SPECIFIED POSTPROCEDURAL STATES: Chronic | ICD-10-CM

## 2018-07-01 DIAGNOSIS — Z98.891 HISTORY OF UTERINE SCAR FROM PREVIOUS SURGERY: Chronic | ICD-10-CM

## 2018-07-03 ENCOUNTER — APPOINTMENT (OUTPATIENT)
Dept: INTERNAL MEDICINE | Facility: CLINIC | Age: 58
End: 2018-07-03
Payer: MEDICAID

## 2018-07-03 ENCOUNTER — APPOINTMENT (OUTPATIENT)
Dept: ULTRASOUND IMAGING | Facility: CLINIC | Age: 58
End: 2018-07-03

## 2018-07-03 VITALS
HEIGHT: 62 IN | SYSTOLIC BLOOD PRESSURE: 120 MMHG | OXYGEN SATURATION: 95 % | WEIGHT: 189 LBS | DIASTOLIC BLOOD PRESSURE: 80 MMHG | BODY MASS INDEX: 34.78 KG/M2 | HEART RATE: 106 BPM | TEMPERATURE: 98.4 F

## 2018-07-03 PROCEDURE — 99214 OFFICE O/P EST MOD 30 MIN: CPT

## 2018-07-03 NOTE — HISTORY OF PRESENT ILLNESS
[FreeTextEntry1] : swelling lower extremities\par bruising\par  [de-identified] : Pt reports that she has been getting increasing swelling of the lower extremities. She also reports that the swelling has been going on for the past 2 months and over the past month it has gotten worse. She is walking more. She has not been smoking. She is avoiding salt and salty foods. She state that the legs are better in the am when she gets OOB. She has tried the support hose and she states that she has gotten cuts from it. She reports that the right leg is worse then the left. it is painful and she is taking ibuprofen for the pain.

## 2018-07-03 NOTE — PHYSICAL EXAM
[No Acute Distress] : no acute distress [Normal Sclera/Conjunctiva] : normal sclera/conjunctiva [Normal Outer Ear/Nose] : the outer ears and nose were normal in appearance [No JVD] : no jugular venous distention [No Respiratory Distress] : no respiratory distress  [de-identified] : positive peripheral edema.

## 2018-07-04 ENCOUNTER — FORM ENCOUNTER (OUTPATIENT)
Age: 58
End: 2018-07-04

## 2018-07-05 ENCOUNTER — OUTPATIENT (OUTPATIENT)
Dept: OUTPATIENT SERVICES | Facility: HOSPITAL | Age: 58
LOS: 1 days | End: 2018-07-05
Payer: COMMERCIAL

## 2018-07-05 ENCOUNTER — APPOINTMENT (OUTPATIENT)
Dept: ULTRASOUND IMAGING | Facility: CLINIC | Age: 58
End: 2018-07-05
Payer: MEDICAID

## 2018-07-05 DIAGNOSIS — Z98.891 HISTORY OF UTERINE SCAR FROM PREVIOUS SURGERY: Chronic | ICD-10-CM

## 2018-07-05 DIAGNOSIS — Z98.890 OTHER SPECIFIED POSTPROCEDURAL STATES: Chronic | ICD-10-CM

## 2018-07-05 DIAGNOSIS — R60.9 EDEMA, UNSPECIFIED: ICD-10-CM

## 2018-07-05 PROCEDURE — 93970 EXTREMITY STUDY: CPT

## 2018-07-05 PROCEDURE — 93970 EXTREMITY STUDY: CPT | Mod: 26

## 2018-07-08 ENCOUNTER — FORM ENCOUNTER (OUTPATIENT)
Age: 58
End: 2018-07-08

## 2018-07-09 ENCOUNTER — APPOINTMENT (OUTPATIENT)
Dept: MAMMOGRAPHY | Facility: CLINIC | Age: 58
End: 2018-07-09
Payer: MEDICAID

## 2018-07-09 ENCOUNTER — OUTPATIENT (OUTPATIENT)
Dept: OUTPATIENT SERVICES | Facility: HOSPITAL | Age: 58
LOS: 1 days | End: 2018-07-09
Payer: COMMERCIAL

## 2018-07-09 ENCOUNTER — APPOINTMENT (OUTPATIENT)
Dept: INTERNAL MEDICINE | Facility: CLINIC | Age: 58
End: 2018-07-09
Payer: MEDICAID

## 2018-07-09 ENCOUNTER — APPOINTMENT (OUTPATIENT)
Dept: ULTRASOUND IMAGING | Facility: CLINIC | Age: 58
End: 2018-07-09
Payer: MEDICAID

## 2018-07-09 DIAGNOSIS — Z71.89 OTHER SPECIFIED COUNSELING: ICD-10-CM

## 2018-07-09 DIAGNOSIS — R92.8 OTHER ABNORMAL AND INCONCLUSIVE FINDINGS ON DIAGNOSTIC IMAGING OF BREAST: ICD-10-CM

## 2018-07-09 DIAGNOSIS — Z98.890 OTHER SPECIFIED POSTPROCEDURAL STATES: Chronic | ICD-10-CM

## 2018-07-09 DIAGNOSIS — Z98.891 HISTORY OF UTERINE SCAR FROM PREVIOUS SURGERY: Chronic | ICD-10-CM

## 2018-07-09 PROCEDURE — 77066 DX MAMMO INCL CAD BI: CPT | Mod: 26

## 2018-07-09 PROCEDURE — G0279: CPT | Mod: 26

## 2018-07-09 PROCEDURE — 76641 ULTRASOUND BREAST COMPLETE: CPT | Mod: 26,50

## 2018-07-09 PROCEDURE — G0279: CPT

## 2018-07-09 PROCEDURE — 77066 DX MAMMO INCL CAD BI: CPT

## 2018-07-09 PROCEDURE — 76641 ULTRASOUND BREAST COMPLETE: CPT

## 2018-07-10 ENCOUNTER — APPOINTMENT (OUTPATIENT)
Dept: INTERNAL MEDICINE | Facility: CLINIC | Age: 58
End: 2018-07-10
Payer: MEDICAID

## 2018-07-10 VITALS
WEIGHT: 190 LBS | OXYGEN SATURATION: 97 % | DIASTOLIC BLOOD PRESSURE: 80 MMHG | BODY MASS INDEX: 34.96 KG/M2 | HEART RATE: 73 BPM | HEIGHT: 62 IN | SYSTOLIC BLOOD PRESSURE: 120 MMHG

## 2018-07-10 DIAGNOSIS — R19.04 LEFT LOWER QUADRANT ABDOMINAL SWELLING, MASS AND LUMP: ICD-10-CM

## 2018-07-10 DIAGNOSIS — Z87.898 PERSONAL HISTORY OF OTHER SPECIFIED CONDITIONS: ICD-10-CM

## 2018-07-10 DIAGNOSIS — R26.2 DIFFICULTY IN WALKING, NOT ELSEWHERE CLASSIFIED: ICD-10-CM

## 2018-07-10 DIAGNOSIS — K57.93 DIVERTICULITIS OF INTESTINE, PART UNSPECIFIED, W/OUT PERFORATION OR ABSCESS WITH BLEEDING: ICD-10-CM

## 2018-07-10 DIAGNOSIS — Z87.09 PERSONAL HISTORY OF OTHER DISEASES OF THE RESPIRATORY SYSTEM: ICD-10-CM

## 2018-07-10 PROCEDURE — 99214 OFFICE O/P EST MOD 30 MIN: CPT

## 2018-07-10 NOTE — ASSESSMENT
[FreeTextEntry1] : Pt is to see the breast surgeon tomorrow. She also has a heat rash under the breasts.

## 2018-07-10 NOTE — REVIEW OF SYSTEMS
[Vision Problems] : vision problems [Abdominal Pain] : abdominal pain [Itching] : Itching [Negative] : Constitutional [de-identified] : rash under breasts

## 2018-07-10 NOTE — PHYSICAL EXAM
[No Acute Distress] : no acute distress [Normal Sclera/Conjunctiva] : normal sclera/conjunctiva [Normal Outer Ear/Nose] : the outer ears and nose were normal in appearance [No JVD] : no jugular venous distention [No Respiratory Distress] : no respiratory distress  [Clear to Auscultation] : lungs were clear to auscultation bilaterally [Normal Rate] : normal rate  [No Joint Swelling] : no joint swelling [Alert and Oriented x3] : oriented to person, place, and time [de-identified] : erythematous rash under both breasts

## 2018-07-10 NOTE — HISTORY OF PRESENT ILLNESS
[FreeTextEntry1] : breast mass, rash [de-identified] : Pt went for a mammogram and was found to have 3 lesions in the left breast. She is upset and concerned and has been advised to see the surgeon. She is still having abdominal discomfort. she has stopped smoking and has not had any cigarettes. she is still trying to lose weight however but she finds that she is having a problem.\par Pt also is complaining of a rash under the breast. the rash has been there for 2 weeks. It has been getting worse. The elastic bras make it worse. She has not put anything on the rash at this time.

## 2018-07-11 ENCOUNTER — MEDICATION RENEWAL (OUTPATIENT)
Age: 58
End: 2018-07-11

## 2018-07-11 RX ORDER — CLOTRIMAZOLE AND BETAMETHASONE DIPROPIONATE 10; .5 MG/ML; MG/ML
1-0.05 LOTION TOPICAL TWICE DAILY
Qty: 1 | Refills: 2 | Status: DISCONTINUED | COMMUNITY
Start: 2018-07-10 | End: 2018-07-11

## 2018-07-12 ENCOUNTER — MEDICATION RENEWAL (OUTPATIENT)
Age: 58
End: 2018-07-12

## 2018-07-12 ENCOUNTER — APPOINTMENT (OUTPATIENT)
Dept: CARDIOLOGY | Facility: CLINIC | Age: 58
End: 2018-07-12

## 2018-07-12 ENCOUNTER — APPOINTMENT (OUTPATIENT)
Dept: PULMONOLOGY | Facility: CLINIC | Age: 58
End: 2018-07-12

## 2018-07-16 PROBLEM — J44.9 CHRONIC OBSTRUCTIVE PULMONARY DISEASE, UNSPECIFIED: Chronic | Status: ACTIVE | Noted: 2017-06-05

## 2018-07-16 PROBLEM — F31.9 BIPOLAR DISORDER, UNSPECIFIED: Chronic | Status: ACTIVE | Noted: 2017-06-05

## 2018-07-17 PROBLEM — D17.9 BENIGN LIPOMATOUS NEOPLASM, UNSPECIFIED: Chronic | Status: ACTIVE | Noted: 2018-03-26

## 2018-07-17 PROBLEM — F41.9 ANXIETY DISORDER, UNSPECIFIED: Chronic | Status: ACTIVE | Noted: 2017-06-05

## 2018-07-17 PROBLEM — J45.909 UNSPECIFIED ASTHMA, UNCOMPLICATED: Chronic | Status: ACTIVE | Noted: 2017-06-05

## 2018-07-17 PROBLEM — F32.9 MAJOR DEPRESSIVE DISORDER, SINGLE EPISODE, UNSPECIFIED: Chronic | Status: ACTIVE | Noted: 2017-06-05

## 2018-07-17 PROBLEM — D17.1 BENIGN LIPOMATOUS NEOPLASM OF SKIN AND SUBCUTANEOUS TISSUE OF TRUNK: Chronic | Status: ACTIVE | Noted: 2017-06-05

## 2018-07-17 PROBLEM — I10 ESSENTIAL (PRIMARY) HYPERTENSION: Chronic | Status: ACTIVE | Noted: 2017-06-05

## 2018-07-17 PROBLEM — N20.0 CALCULUS OF KIDNEY: Chronic | Status: ACTIVE | Noted: 2018-03-26

## 2018-07-17 PROBLEM — K57.90 DIVERTICULOSIS OF INTESTINE, PART UNSPECIFIED, WITHOUT PERFORATION OR ABSCESS WITHOUT BLEEDING: Chronic | Status: ACTIVE | Noted: 2017-06-05

## 2018-07-17 PROBLEM — K82.9 DISEASE OF GALLBLADDER, UNSPECIFIED: Chronic | Status: ACTIVE | Noted: 2018-03-26

## 2018-07-22 PROBLEM — Z78.9 ALCOHOL USE: Status: ACTIVE | Noted: 2017-07-07

## 2018-07-23 ENCOUNTER — APPOINTMENT (OUTPATIENT)
Dept: INTERNAL MEDICINE | Facility: CLINIC | Age: 58
End: 2018-07-23
Payer: MEDICAID

## 2018-07-23 VITALS
HEART RATE: 97 BPM | SYSTOLIC BLOOD PRESSURE: 110 MMHG | TEMPERATURE: 97.8 F | OXYGEN SATURATION: 93 % | BODY MASS INDEX: 34.6 KG/M2 | HEIGHT: 62 IN | WEIGHT: 188 LBS | DIASTOLIC BLOOD PRESSURE: 78 MMHG

## 2018-07-23 PROCEDURE — 99214 OFFICE O/P EST MOD 30 MIN: CPT

## 2018-07-23 NOTE — PHYSICAL EXAM
[No Acute Distress] : no acute distress [Normal Sclera/Conjunctiva] : normal sclera/conjunctiva [Normal Outer Ear/Nose] : the outer ears and nose were normal in appearance [No JVD] : no jugular venous distention [No Respiratory Distress] : no respiratory distress  [Clear to Auscultation] : lungs were clear to auscultation bilaterally [Normal Rate] : normal rate  [Regular Rhythm] : with a regular rhythm [de-identified] : swelling the right knee. Tenderness of the medial area of the right knee.

## 2018-07-23 NOTE — HISTORY OF PRESENT ILLNESS
[FreeTextEntry1] : f/u to knee pain, peripheral edema, COPD, and breast mass. [de-identified] : Pt reports that she has bad pain in the right knee. Will send to PT of the knee. Her COPD has been under better control. She is still having peripheral edema and the ankles have been swelling. She is still having pain in the abdomen.

## 2018-07-23 NOTE — ASSESSMENT
[FreeTextEntry1] : Pt is having bad pain in the right knee. She will be sent for PT of the right knee.

## 2018-07-24 ENCOUNTER — OUTPATIENT (OUTPATIENT)
Dept: OUTPATIENT SERVICES | Facility: HOSPITAL | Age: 58
LOS: 1 days | End: 2018-07-24
Payer: COMMERCIAL

## 2018-07-24 ENCOUNTER — APPOINTMENT (OUTPATIENT)
Dept: MRI IMAGING | Facility: CLINIC | Age: 58
End: 2018-07-24
Payer: MEDICAID

## 2018-07-24 DIAGNOSIS — Z00.8 ENCOUNTER FOR OTHER GENERAL EXAMINATION: ICD-10-CM

## 2018-07-24 DIAGNOSIS — Z98.890 OTHER SPECIFIED POSTPROCEDURAL STATES: Chronic | ICD-10-CM

## 2018-07-24 DIAGNOSIS — Z98.891 HISTORY OF UTERINE SCAR FROM PREVIOUS SURGERY: Chronic | ICD-10-CM

## 2018-07-24 PROCEDURE — 0159T: CPT | Mod: 26

## 2018-07-24 PROCEDURE — A9585: CPT

## 2018-07-24 PROCEDURE — 82565 ASSAY OF CREATININE: CPT

## 2018-07-24 PROCEDURE — C8937: CPT

## 2018-07-24 PROCEDURE — 77059 MRI BREAST BILATERAL: CPT | Mod: 26

## 2018-07-24 PROCEDURE — C8908: CPT

## 2018-07-25 ENCOUNTER — APPOINTMENT (OUTPATIENT)
Dept: INTERNAL MEDICINE | Facility: CLINIC | Age: 58
End: 2018-07-25

## 2018-07-31 ENCOUNTER — APPOINTMENT (OUTPATIENT)
Dept: CARDIOLOGY | Facility: CLINIC | Age: 58
End: 2018-07-31
Payer: MEDICAID

## 2018-07-31 ENCOUNTER — NON-APPOINTMENT (OUTPATIENT)
Age: 58
End: 2018-07-31

## 2018-07-31 VITALS
SYSTOLIC BLOOD PRESSURE: 139 MMHG | OXYGEN SATURATION: 93 % | DIASTOLIC BLOOD PRESSURE: 81 MMHG | HEIGHT: 62 IN | WEIGHT: 189 LBS | BODY MASS INDEX: 34.78 KG/M2 | HEART RATE: 92 BPM

## 2018-07-31 DIAGNOSIS — R07.9 CHEST PAIN, UNSPECIFIED: ICD-10-CM

## 2018-07-31 PROCEDURE — 99204 OFFICE O/P NEW MOD 45 MIN: CPT

## 2018-07-31 PROCEDURE — 93000 ELECTROCARDIOGRAM COMPLETE: CPT

## 2018-08-03 ENCOUNTER — APPOINTMENT (OUTPATIENT)
Dept: PULMONOLOGY | Facility: CLINIC | Age: 58
End: 2018-08-03

## 2018-08-04 ENCOUNTER — MEDICATION RENEWAL (OUTPATIENT)
Age: 58
End: 2018-08-04

## 2018-08-06 ENCOUNTER — APPOINTMENT (OUTPATIENT)
Dept: CARDIOLOGY | Facility: CLINIC | Age: 58
End: 2018-08-06
Payer: MEDICAID

## 2018-08-06 PROCEDURE — 93306 TTE W/DOPPLER COMPLETE: CPT

## 2018-08-07 ENCOUNTER — APPOINTMENT (OUTPATIENT)
Dept: CARDIOLOGY | Facility: CLINIC | Age: 58
End: 2018-08-07
Payer: MEDICAID

## 2018-08-07 PROCEDURE — 93015 CV STRESS TEST SUPVJ I&R: CPT

## 2018-08-07 PROCEDURE — 78452 HT MUSCLE IMAGE SPECT MULT: CPT

## 2018-08-07 PROCEDURE — A9500: CPT

## 2018-08-15 ENCOUNTER — APPOINTMENT (OUTPATIENT)
Dept: PULMONOLOGY | Facility: CLINIC | Age: 58
End: 2018-08-15
Payer: MEDICAID

## 2018-08-15 VITALS
HEART RATE: 100 BPM | OXYGEN SATURATION: 91 % | BODY MASS INDEX: 34.93 KG/M2 | SYSTOLIC BLOOD PRESSURE: 136 MMHG | DIASTOLIC BLOOD PRESSURE: 80 MMHG | WEIGHT: 191 LBS

## 2018-08-15 VITALS — OXYGEN SATURATION: 94 %

## 2018-08-15 PROCEDURE — 94010 BREATHING CAPACITY TEST: CPT

## 2018-08-15 PROCEDURE — 99215 OFFICE O/P EST HI 40 MIN: CPT | Mod: 25

## 2018-08-15 RX ORDER — VARENICLINE TARTRATE 0.5 (11)-1
0.5 MG X 11 & KIT ORAL
Qty: 1 | Refills: 0 | Status: DISCONTINUED | COMMUNITY
Start: 2018-06-20 | End: 2018-08-15

## 2018-08-15 RX ORDER — TIOTROPIUM BROMIDE 18 UG/1
18 CAPSULE ORAL; RESPIRATORY (INHALATION) DAILY
Qty: 1 | Refills: 5 | Status: DISCONTINUED | COMMUNITY
Start: 2018-01-19 | End: 2018-08-15

## 2018-08-15 RX ORDER — IBUPROFEN AND FAMOTIDINE 800; 26.6 MG/1; MG/1
800-26.6 TABLET, COATED ORAL
Qty: 90 | Refills: 2 | Status: DISCONTINUED | COMMUNITY
Start: 2018-06-20 | End: 2018-08-15

## 2018-08-15 RX ORDER — MONTELUKAST 10 MG/1
10 TABLET, FILM COATED ORAL
Qty: 30 | Refills: 0 | Status: DISCONTINUED | COMMUNITY
Start: 2018-05-08 | End: 2018-08-15

## 2018-08-15 RX ORDER — MOMETASONE FUROATE AND FORMOTEROL FUMARATE DIHYDRATE 200; 5 UG/1; UG/1
200-5 AEROSOL RESPIRATORY (INHALATION)
Qty: 2 | Refills: 5 | Status: DISCONTINUED | OUTPATIENT
Start: 2017-11-03 | End: 2018-08-15

## 2018-08-15 RX ORDER — OXYCODONE AND ACETAMINOPHEN 5; 325 MG/1; MG/1
5-325 TABLET ORAL
Qty: 28 | Refills: 0 | Status: DISCONTINUED | COMMUNITY
Start: 2018-01-08 | End: 2018-08-15

## 2018-08-20 ENCOUNTER — APPOINTMENT (OUTPATIENT)
Dept: INTERNAL MEDICINE | Facility: CLINIC | Age: 58
End: 2018-08-20
Payer: MEDICAID

## 2018-08-20 VITALS
HEART RATE: 58 BPM | TEMPERATURE: 97.9 F | BODY MASS INDEX: 35.51 KG/M2 | OXYGEN SATURATION: 97 % | DIASTOLIC BLOOD PRESSURE: 78 MMHG | WEIGHT: 193 LBS | HEIGHT: 62 IN | SYSTOLIC BLOOD PRESSURE: 110 MMHG

## 2018-08-20 DIAGNOSIS — M17.11 UNILATERAL PRIMARY OSTEOARTHRITIS, RIGHT KNEE: ICD-10-CM

## 2018-08-20 PROCEDURE — 99214 OFFICE O/P EST MOD 30 MIN: CPT

## 2018-08-23 ENCOUNTER — APPOINTMENT (OUTPATIENT)
Dept: ULTRASOUND IMAGING | Facility: CLINIC | Age: 58
End: 2018-08-23
Payer: MEDICAID

## 2018-08-23 ENCOUNTER — RESULT REVIEW (OUTPATIENT)
Age: 58
End: 2018-08-23

## 2018-08-23 ENCOUNTER — OUTPATIENT (OUTPATIENT)
Dept: OUTPATIENT SERVICES | Facility: HOSPITAL | Age: 58
LOS: 1 days | End: 2018-08-23
Payer: COMMERCIAL

## 2018-08-23 DIAGNOSIS — Z98.890 OTHER SPECIFIED POSTPROCEDURAL STATES: Chronic | ICD-10-CM

## 2018-08-23 DIAGNOSIS — Z98.891 HISTORY OF UTERINE SCAR FROM PREVIOUS SURGERY: Chronic | ICD-10-CM

## 2018-08-23 DIAGNOSIS — R92.8 OTHER ABNORMAL AND INCONCLUSIVE FINDINGS ON DIAGNOSTIC IMAGING OF BREAST: ICD-10-CM

## 2018-08-23 PROCEDURE — 88305 TISSUE EXAM BY PATHOLOGIST: CPT

## 2018-08-23 PROCEDURE — 19083 BX BREAST 1ST LESION US IMAG: CPT

## 2018-08-23 PROCEDURE — 88305 TISSUE EXAM BY PATHOLOGIST: CPT | Mod: 26

## 2018-08-23 PROCEDURE — 19084 BX BREAST ADD LESION US IMAG: CPT | Mod: LT

## 2018-08-23 PROCEDURE — 19083 BX BREAST 1ST LESION US IMAG: CPT | Mod: LT

## 2018-08-23 PROCEDURE — 77065 DX MAMMO INCL CAD UNI: CPT

## 2018-08-23 PROCEDURE — 77065 DX MAMMO INCL CAD UNI: CPT | Mod: 26,LT

## 2018-08-23 PROCEDURE — A4648: CPT

## 2018-08-23 PROCEDURE — 19084 BX BREAST ADD LESION US IMAG: CPT

## 2018-08-27 ENCOUNTER — MEDICATION RENEWAL (OUTPATIENT)
Age: 58
End: 2018-08-27

## 2018-08-27 LAB — SURGICAL PATHOLOGY STUDY: SIGNIFICANT CHANGE UP

## 2018-08-29 ENCOUNTER — APPOINTMENT (OUTPATIENT)
Dept: INTERNAL MEDICINE | Facility: CLINIC | Age: 58
End: 2018-08-29

## 2018-08-31 ENCOUNTER — NON-APPOINTMENT (OUTPATIENT)
Age: 58
End: 2018-08-31

## 2018-08-31 ENCOUNTER — APPOINTMENT (OUTPATIENT)
Dept: CARDIOLOGY | Facility: CLINIC | Age: 58
End: 2018-08-31
Payer: MEDICAID

## 2018-08-31 VITALS
HEART RATE: 75 BPM | WEIGHT: 192 LBS | DIASTOLIC BLOOD PRESSURE: 89 MMHG | SYSTOLIC BLOOD PRESSURE: 166 MMHG | BODY MASS INDEX: 35.33 KG/M2 | OXYGEN SATURATION: 95 % | HEIGHT: 62 IN

## 2018-08-31 VITALS — SYSTOLIC BLOOD PRESSURE: 130 MMHG | DIASTOLIC BLOOD PRESSURE: 86 MMHG

## 2018-08-31 DIAGNOSIS — Z87.891 PERSONAL HISTORY OF NICOTINE DEPENDENCE: ICD-10-CM

## 2018-08-31 PROCEDURE — 93000 ELECTROCARDIOGRAM COMPLETE: CPT

## 2018-08-31 PROCEDURE — 99214 OFFICE O/P EST MOD 30 MIN: CPT | Mod: 25

## 2018-09-01 ENCOUNTER — OUTPATIENT (OUTPATIENT)
Dept: OUTPATIENT SERVICES | Facility: HOSPITAL | Age: 58
LOS: 1 days | End: 2018-09-01
Payer: COMMERCIAL

## 2018-09-01 DIAGNOSIS — Z98.890 OTHER SPECIFIED POSTPROCEDURAL STATES: Chronic | ICD-10-CM

## 2018-09-01 DIAGNOSIS — Z98.891 HISTORY OF UTERINE SCAR FROM PREVIOUS SURGERY: Chronic | ICD-10-CM

## 2018-09-06 ENCOUNTER — MEDICATION RENEWAL (OUTPATIENT)
Age: 58
End: 2018-09-06

## 2018-09-10 ENCOUNTER — APPOINTMENT (OUTPATIENT)
Dept: INTERNAL MEDICINE | Facility: CLINIC | Age: 58
End: 2018-09-10
Payer: MEDICAID

## 2018-09-10 VITALS — HEART RATE: 16 BPM | DIASTOLIC BLOOD PRESSURE: 76 MMHG | SYSTOLIC BLOOD PRESSURE: 128 MMHG | TEMPERATURE: 98.3 F

## 2018-09-10 LAB — S PYO AG SPEC QL IA: NEGATIVE

## 2018-09-10 PROCEDURE — 99213 OFFICE O/P EST LOW 20 MIN: CPT

## 2018-09-10 PROCEDURE — 87880 STREP A ASSAY W/OPTIC: CPT | Mod: QW

## 2018-09-10 NOTE — REVIEW OF SYSTEMS
[Nasal Discharge] : nasal discharge [Sore Throat] : sore throat [Negative] : Heme/Lymph [Hearing Loss] : no hearing loss

## 2018-09-10 NOTE — PHYSICAL EXAM

## 2018-09-11 LAB
RAPID RVP RESULT: DETECTED
RV+EV RNA SPEC QL NAA+PROBE: DETECTED

## 2018-09-13 ENCOUNTER — OUTPATIENT (OUTPATIENT)
Dept: OUTPATIENT SERVICES | Facility: HOSPITAL | Age: 58
LOS: 1 days | End: 2018-09-13
Payer: COMMERCIAL

## 2018-09-13 DIAGNOSIS — G47.33 OBSTRUCTIVE SLEEP APNEA (ADULT) (PEDIATRIC): ICD-10-CM

## 2018-09-13 DIAGNOSIS — Z98.890 OTHER SPECIFIED POSTPROCEDURAL STATES: Chronic | ICD-10-CM

## 2018-09-13 DIAGNOSIS — Z98.891 HISTORY OF UTERINE SCAR FROM PREVIOUS SURGERY: Chronic | ICD-10-CM

## 2018-09-13 PROCEDURE — 95810 POLYSOM 6/> YRS 4/> PARAM: CPT

## 2018-09-13 PROCEDURE — 95810 POLYSOM 6/> YRS 4/> PARAM: CPT | Mod: 26

## 2018-09-18 ENCOUNTER — MEDICATION RENEWAL (OUTPATIENT)
Age: 58
End: 2018-09-18

## 2018-09-24 ENCOUNTER — APPOINTMENT (OUTPATIENT)
Dept: INTERNAL MEDICINE | Facility: CLINIC | Age: 58
End: 2018-09-24
Payer: MEDICAID

## 2018-09-24 VITALS
SYSTOLIC BLOOD PRESSURE: 124 MMHG | OXYGEN SATURATION: 97 % | HEART RATE: 87 BPM | DIASTOLIC BLOOD PRESSURE: 80 MMHG | BODY MASS INDEX: 34.6 KG/M2 | HEIGHT: 62 IN | WEIGHT: 188 LBS

## 2018-09-24 DIAGNOSIS — Z87.09 PERSONAL HISTORY OF OTHER DISEASES OF THE RESPIRATORY SYSTEM: ICD-10-CM

## 2018-09-24 DIAGNOSIS — Z01.810 ENCOUNTER FOR PREPROCEDURAL CARDIOVASCULAR EXAMINATION: ICD-10-CM

## 2018-09-24 PROCEDURE — 90686 IIV4 VACC NO PRSV 0.5 ML IM: CPT

## 2018-09-24 PROCEDURE — G0008: CPT

## 2018-09-24 PROCEDURE — 99214 OFFICE O/P EST MOD 30 MIN: CPT | Mod: 25

## 2018-10-02 ENCOUNTER — APPOINTMENT (OUTPATIENT)
Dept: CARDIOLOGY | Facility: CLINIC | Age: 58
End: 2018-10-02

## 2018-10-03 ENCOUNTER — MEDICATION RENEWAL (OUTPATIENT)
Age: 58
End: 2018-10-03

## 2018-10-05 ENCOUNTER — OUTPATIENT (OUTPATIENT)
Dept: OUTPATIENT SERVICES | Facility: HOSPITAL | Age: 58
LOS: 1 days | End: 2018-10-05
Payer: COMMERCIAL

## 2018-10-05 VITALS
RESPIRATION RATE: 16 BRPM | SYSTOLIC BLOOD PRESSURE: 120 MMHG | TEMPERATURE: 98 F | HEIGHT: 62 IN | DIASTOLIC BLOOD PRESSURE: 80 MMHG | HEART RATE: 72 BPM | WEIGHT: 188.05 LBS

## 2018-10-05 DIAGNOSIS — I10 ESSENTIAL (PRIMARY) HYPERTENSION: ICD-10-CM

## 2018-10-05 DIAGNOSIS — Z01.818 ENCOUNTER FOR OTHER PREPROCEDURAL EXAMINATION: ICD-10-CM

## 2018-10-05 DIAGNOSIS — Z29.9 ENCOUNTER FOR PROPHYLACTIC MEASURES, UNSPECIFIED: ICD-10-CM

## 2018-10-05 DIAGNOSIS — Z85.3 PERSONAL HISTORY OF MALIGNANT NEOPLASM OF BREAST: ICD-10-CM

## 2018-10-05 DIAGNOSIS — Z98.890 OTHER SPECIFIED POSTPROCEDURAL STATES: Chronic | ICD-10-CM

## 2018-10-05 DIAGNOSIS — Z98.891 HISTORY OF UTERINE SCAR FROM PREVIOUS SURGERY: Chronic | ICD-10-CM

## 2018-10-05 LAB
ANION GAP SERPL CALC-SCNC: 11 MMOL/L — SIGNIFICANT CHANGE UP (ref 5–17)
APPEARANCE UR: CLEAR — SIGNIFICANT CHANGE UP
APTT BLD: 30 SEC — SIGNIFICANT CHANGE UP (ref 27.5–37.4)
BASOPHILS # BLD AUTO: 0 K/UL — SIGNIFICANT CHANGE UP (ref 0–0.2)
BASOPHILS NFR BLD AUTO: 0.4 % — SIGNIFICANT CHANGE UP (ref 0–2)
BILIRUB UR-MCNC: NEGATIVE — SIGNIFICANT CHANGE UP
BUN SERPL-MCNC: 25 MG/DL — HIGH (ref 8–20)
CALCIUM SERPL-MCNC: 9.4 MG/DL — SIGNIFICANT CHANGE UP (ref 8.6–10.2)
CHLORIDE SERPL-SCNC: 100 MMOL/L — SIGNIFICANT CHANGE UP (ref 98–107)
CO2 SERPL-SCNC: 29 MMOL/L — SIGNIFICANT CHANGE UP (ref 22–29)
COLOR SPEC: YELLOW — SIGNIFICANT CHANGE UP
CREAT SERPL-MCNC: 0.94 MG/DL — SIGNIFICANT CHANGE UP (ref 0.5–1.3)
DIFF PNL FLD: NEGATIVE — SIGNIFICANT CHANGE UP
EOSINOPHIL # BLD AUTO: 0.2 K/UL — SIGNIFICANT CHANGE UP (ref 0–0.5)
EOSINOPHIL NFR BLD AUTO: 4.2 % — SIGNIFICANT CHANGE UP (ref 0–6)
EPI CELLS # UR: SIGNIFICANT CHANGE UP
GLUCOSE SERPL-MCNC: 114 MG/DL — SIGNIFICANT CHANGE UP (ref 70–115)
GLUCOSE UR QL: NEGATIVE MG/DL — SIGNIFICANT CHANGE UP
HCT VFR BLD CALC: 43.9 % — SIGNIFICANT CHANGE UP (ref 37–47)
HGB BLD-MCNC: 13.7 G/DL — SIGNIFICANT CHANGE UP (ref 12–16)
INR BLD: 0.93 RATIO — SIGNIFICANT CHANGE UP (ref 0.88–1.16)
KETONES UR-MCNC: NEGATIVE — SIGNIFICANT CHANGE UP
LEUKOCYTE ESTERASE UR-ACNC: NEGATIVE — SIGNIFICANT CHANGE UP
LYMPHOCYTES # BLD AUTO: 1.5 K/UL — SIGNIFICANT CHANGE UP (ref 1–4.8)
LYMPHOCYTES # BLD AUTO: 28.7 % — SIGNIFICANT CHANGE UP (ref 20–55)
MCHC RBC-ENTMCNC: 31.1 PG — HIGH (ref 27–31)
MCHC RBC-ENTMCNC: 31.2 G/DL — LOW (ref 32–36)
MCV RBC AUTO: 99.5 FL — HIGH (ref 81–99)
MONOCYTES # BLD AUTO: 0.5 K/UL — SIGNIFICANT CHANGE UP (ref 0–0.8)
MONOCYTES NFR BLD AUTO: 9.3 % — SIGNIFICANT CHANGE UP (ref 3–10)
NEUTROPHILS # BLD AUTO: 3 K/UL — SIGNIFICANT CHANGE UP (ref 1.8–8)
NEUTROPHILS NFR BLD AUTO: 57.4 % — SIGNIFICANT CHANGE UP (ref 37–73)
NITRITE UR-MCNC: NEGATIVE — SIGNIFICANT CHANGE UP
PH UR: 6.5 — SIGNIFICANT CHANGE UP (ref 5–8)
PLATELET # BLD AUTO: 290 K/UL — SIGNIFICANT CHANGE UP (ref 150–400)
POTASSIUM SERPL-MCNC: 4.4 MMOL/L — SIGNIFICANT CHANGE UP (ref 3.5–5.3)
POTASSIUM SERPL-SCNC: 4.4 MMOL/L — SIGNIFICANT CHANGE UP (ref 3.5–5.3)
PROT UR-MCNC: 15 MG/DL
PROTHROM AB SERPL-ACNC: 10.2 SEC — SIGNIFICANT CHANGE UP (ref 9.8–12.7)
RBC # BLD: 4.41 M/UL — SIGNIFICANT CHANGE UP (ref 4.4–5.2)
RBC # FLD: 13.3 % — SIGNIFICANT CHANGE UP (ref 11–15.6)
SODIUM SERPL-SCNC: 140 MMOL/L — SIGNIFICANT CHANGE UP (ref 135–145)
SP GR SPEC: 1.02 — SIGNIFICANT CHANGE UP (ref 1.01–1.02)
UROBILINOGEN FLD QL: NEGATIVE MG/DL — SIGNIFICANT CHANGE UP
WBC # BLD: 5.3 K/UL — SIGNIFICANT CHANGE UP (ref 4.8–10.8)
WBC # FLD AUTO: 5.3 K/UL — SIGNIFICANT CHANGE UP (ref 4.8–10.8)
WBC UR QL: SIGNIFICANT CHANGE UP

## 2018-10-05 PROCEDURE — 71046 X-RAY EXAM CHEST 2 VIEWS: CPT | Mod: 26

## 2018-10-05 PROCEDURE — 71046 X-RAY EXAM CHEST 2 VIEWS: CPT

## 2018-10-05 PROCEDURE — 85610 PROTHROMBIN TIME: CPT

## 2018-10-05 PROCEDURE — 85730 THROMBOPLASTIN TIME PARTIAL: CPT

## 2018-10-05 PROCEDURE — 85027 COMPLETE CBC AUTOMATED: CPT

## 2018-10-05 PROCEDURE — G0463: CPT

## 2018-10-05 PROCEDURE — 81001 URINALYSIS AUTO W/SCOPE: CPT

## 2018-10-05 PROCEDURE — 80048 BASIC METABOLIC PNL TOTAL CA: CPT

## 2018-10-05 PROCEDURE — 36415 COLL VENOUS BLD VENIPUNCTURE: CPT

## 2018-10-05 RX ORDER — SODIUM CHLORIDE 9 MG/ML
3 INJECTION INTRAMUSCULAR; INTRAVENOUS; SUBCUTANEOUS ONCE
Qty: 0 | Refills: 0 | Status: DISCONTINUED | OUTPATIENT
Start: 2018-10-16 | End: 2018-10-31

## 2018-10-05 RX ORDER — AMLODIPINE BESYLATE AND BENAZEPRIL HYDROCHLORIDE 10; 20 MG/1; MG/1
1 CAPSULE ORAL
Qty: 0 | Refills: 0 | COMMUNITY

## 2018-10-05 NOTE — H&P PST ADULT - ASSESSMENT
medications reviewed, instructions given on what medications to take and what not to take. Asked the patient to take the Blood pressure medication/ heart medication on DOP. medications reviewed, instructions given on what medications to take and what not to take. Asked the patient to take the Blood pressure medication/ heart medication on DOP.   CAPRINI SCORE [CLOT]    AGE RELATED RISK FACTORS                                                       MOBILITY RELATED FACTORS  [x ] Age 41-60 years                                            (1 Point)                  [ ] Bed rest                                                        (1 Point)  [ ] Age: 61-74 years                                           (2 Points)                 [ ] Plaster cast                                                   (2 Points)  [ ] Age= 75 years                                              (3 Points)                 [ ] Bed bound for more than 72 hours                 (2 Points)    DISEASE RELATED RISK FACTORS                                               GENDER SPECIFIC FACTORS  [ ] Edema in the lower extremities                       (1 Point)                  [ ] Pregnancy                                                     (1 Point)  [ ] Varicose veins                                               (1 Point)                  [ ] Post-partum < 6 weeks                                   (1 Point)             [x ] BMI > 25 Kg/m2                                            (1 Point)                  [ ] Hormonal therapy  or oral contraception          (1 Point)                 [ ] Sepsis (in the previous month)                        (1 Point)                  [ ] History of pregnancy complications                 (1 point)  [ ] Pneumonia or serious lung disease                                               [ ] Unexplained or recurrent                     (1 Point)           (in the previous month)                               (1 Point)  [x ] Abnormal pulmonary function test                     (1 Point)                 SURGERY RELATED RISK FACTORS  [ ] Acute myocardial infarction                              (1 Point)                 [ ]  Section                                             (1 Point)  [ ] Congestive heart failure (in the previous month)  (1 Point)               [ ] Minor surgery                                                  (1 Point)   [ ] Inflammatory bowel disease                             (1 Point)                 [ ] Arthroscopic surgery                                        (2 Points)  [ ] Central venous access                                      (2 Points)                [x ] General surgery lasting more than 45 minutes   (2 Points)       [ ] Stroke (in the previous month)                          (5 Points)               [ ] Elective arthroplasty                                         (5 Points)                                                                                                                                               HEMATOLOGY RELATED FACTORS                                                 TRAUMA RELATED RISK FACTORS  [ ] Prior episodes of VTE                                     (3 Points)                 [ ] Fracture of the hip, pelvis, or leg                       (5 Points)  [ ] Positive family history for VTE                         (3 Points)                 [ ] Acute spinal cord injury (in the previous month)  (5 Points)  [ ] Prothrombin 67755 A                                     (3 Points)                 [ ] Paralysis  (less than 1 month)                             (5 Points)  [ ] Factor V Leiden                                             (3 Points)                  [ ] Multiple Trauma within 1 month                        (5 Points)  [ ] Lupus anticoagulants                                     (3 Points)                                                           [ ] Anticardiolipin antibodies                               (3 Points)                                                       [ ] High homocysteine in the blood                      (3 Points)                                             [ ] Other congenital or acquired thrombophilia      (3 Points)                                                [ ] Heparin induced thrombocytopenia                  (3 Points)                                          Total Score [ 5        ]

## 2018-10-05 NOTE — ASU PATIENT PROFILE, ADULT - LEARNING ASSESSMENT (PATIENT) ADDITIONAL COMMENTS
Instructed pt on pre-op instructions, tips for safer surgery, pain management scale, pre-surgical infection prevention instructions, medical clearance- Dr Peterson, pulmonary clearance - Dr Hopson, Cardiac clearance - Dr Quintero, take Ellipta inhaler, Metoprolol & Amlodipine with sip of water the morning of surgery, bring Ventolin rescue inhaler if needed in recovery, smoking cessation pamphlet, center for tobacco control course scheduled and verbalized understanding of all instructions given

## 2018-10-05 NOTE — H&P PST ADULT - PROBLEM SELECTOR PLAN 1
Left breast lumpectomy with needle localization. Medical and cardiac clearance pending, Pulmonary clearance pending

## 2018-10-05 NOTE — H&P PST ADULT - NSANTHOSAYNRD_GEN_A_CORE
Yes No. JAGDISH screening performed.  STOP BANG Legend: 0-2 = LOW Risk; 3-4 = INTERMEDIATE Risk; 5-8 = HIGH Risk

## 2018-10-05 NOTE — H&P PST ADULT - PMH
Anxiety    Asthma    Bipolar 1 disorder  no therapy  Chest pain  with anxiety  COPD (chronic obstructive pulmonary disease)  emphysema  Depressed    Diverticulosis    Gall bladder disease    Hypertension    Kidney stone    Lipoma  left abdomen / hip  Lipoma of abdominal wall  left lateral  JAGDISH (obstructive sleep apnea)  mild, no treatment Anxiety    Asthma    Bipolar 1 disorder  no therapy  COPD (chronic obstructive pulmonary disease)  emphysema  Depressed    Diverticulosis    Gall bladder disease    Hypertension    Kidney stone    Lipoma  left abdomen / hip  Lipoma of abdominal wall  left lateral

## 2018-10-06 PROBLEM — R07.9 CHEST PAIN, UNSPECIFIED: Chronic | Status: INACTIVE | Noted: 2017-06-05 | Resolved: 2018-10-05

## 2018-10-06 PROBLEM — G47.33 OBSTRUCTIVE SLEEP APNEA (ADULT) (PEDIATRIC): Chronic | Status: INACTIVE | Noted: 2017-06-05 | Resolved: 2018-10-05

## 2018-10-07 NOTE — ASU PATIENT PROFILE, ADULT - NS PRO AD INFO GIVEN Y
BPH (benign prostatic hypertrophy)    Cancer  prostate/bladder cancer  HF (heart failure)    HTN (Hypertension)    Hyperlipemia    Pacemaker    PAF (paroxysmal atrial fibrillation)    Prostate Ca  s/p seed implant  Prostate cancer    Seizure    TIA (Transient Ischemic Attack)
yes

## 2018-10-10 ENCOUNTER — MEDICATION RENEWAL (OUTPATIENT)
Age: 58
End: 2018-10-10

## 2018-10-10 ENCOUNTER — APPOINTMENT (OUTPATIENT)
Dept: INTERNAL MEDICINE | Facility: CLINIC | Age: 58
End: 2018-10-10
Payer: MEDICAID

## 2018-10-10 VITALS
DIASTOLIC BLOOD PRESSURE: 80 MMHG | WEIGHT: 188 LBS | OXYGEN SATURATION: 98 % | BODY MASS INDEX: 34.6 KG/M2 | HEIGHT: 62 IN | HEART RATE: 79 BPM | TEMPERATURE: 98.1 F | SYSTOLIC BLOOD PRESSURE: 140 MMHG

## 2018-10-10 DIAGNOSIS — Z86.19 PERSONAL HISTORY OF OTHER INFECTIOUS AND PARASITIC DISEASES: ICD-10-CM

## 2018-10-10 DIAGNOSIS — Z86.69 PERSONAL HISTORY OF OTHER DISEASES OF THE NERVOUS SYSTEM AND SENSE ORGANS: ICD-10-CM

## 2018-10-10 PROCEDURE — 99215 OFFICE O/P EST HI 40 MIN: CPT

## 2018-10-10 RX ORDER — SOFT LENS DISINFECTANT
SOLUTION, NON-ORAL MISCELLANEOUS
Qty: 1 | Refills: 0 | Status: DISCONTINUED | COMMUNITY
Start: 2017-11-21 | End: 2018-10-10

## 2018-10-10 RX ORDER — ALBUTEROL SULFATE 2.5 MG/3ML
(2.5 MG/3ML) SOLUTION RESPIRATORY (INHALATION)
Qty: 1 | Refills: 3 | Status: DISCONTINUED | COMMUNITY
Start: 2018-01-19 | End: 2018-10-10

## 2018-10-10 RX ORDER — NICOTINE 21 MG/24HR
21 PATCH, TRANSDERMAL 24 HOURS TRANSDERMAL DAILY
Qty: 1 | Refills: 2 | Status: DISCONTINUED | COMMUNITY
Start: 2018-04-14 | End: 2018-10-10

## 2018-10-11 ENCOUNTER — APPOINTMENT (OUTPATIENT)
Dept: PULMONOLOGY | Facility: CLINIC | Age: 58
End: 2018-10-11
Payer: MEDICAID

## 2018-10-11 VITALS
SYSTOLIC BLOOD PRESSURE: 150 MMHG | HEART RATE: 78 BPM | BODY MASS INDEX: 34.39 KG/M2 | RESPIRATION RATE: 16 BRPM | OXYGEN SATURATION: 95 % | WEIGHT: 188 LBS | DIASTOLIC BLOOD PRESSURE: 90 MMHG

## 2018-10-11 PROCEDURE — 99214 OFFICE O/P EST MOD 30 MIN: CPT | Mod: 25

## 2018-10-11 PROCEDURE — 94010 BREATHING CAPACITY TEST: CPT

## 2018-10-11 RX ORDER — FLUTICASONE FUROATE, UMECLIDINIUM BROMIDE AND VILANTEROL TRIFENATATE 100; 62.5; 25 UG/1; UG/1; UG/1
100-62.5-25 POWDER RESPIRATORY (INHALATION)
Refills: 0 | Status: DISCONTINUED | COMMUNITY
Start: 2018-08-15 | End: 2018-10-11

## 2018-10-12 ENCOUNTER — MEDICATION RENEWAL (OUTPATIENT)
Age: 58
End: 2018-10-12

## 2018-10-12 RX ORDER — TIOTROPIUM BROMIDE 18 UG/1
18 CAPSULE ORAL; RESPIRATORY (INHALATION) DAILY
Qty: 1 | Refills: 5 | Status: DISCONTINUED | COMMUNITY
Start: 2018-10-11 | End: 2018-10-12

## 2018-10-12 RX ORDER — ACLIDINIUM BROMIDE 400 UG/1
400 POWDER, METERED RESPIRATORY (INHALATION)
Refills: 0 | Status: DISCONTINUED | COMMUNITY
End: 2018-10-12

## 2018-10-16 ENCOUNTER — MEDICATION RENEWAL (OUTPATIENT)
Age: 58
End: 2018-10-16

## 2018-10-16 ENCOUNTER — OUTPATIENT (OUTPATIENT)
Dept: OUTPATIENT SERVICES | Facility: HOSPITAL | Age: 58
LOS: 1 days | End: 2018-10-16
Payer: COMMERCIAL

## 2018-10-16 ENCOUNTER — RESULT REVIEW (OUTPATIENT)
Age: 58
End: 2018-10-16

## 2018-10-16 VITALS
SYSTOLIC BLOOD PRESSURE: 132 MMHG | OXYGEN SATURATION: 99 % | DIASTOLIC BLOOD PRESSURE: 70 MMHG | TEMPERATURE: 98 F | HEIGHT: 64 IN | HEART RATE: 60 BPM | WEIGHT: 188.05 LBS | RESPIRATION RATE: 16 BRPM

## 2018-10-16 VITALS
OXYGEN SATURATION: 95 % | DIASTOLIC BLOOD PRESSURE: 60 MMHG | SYSTOLIC BLOOD PRESSURE: 130 MMHG | HEART RATE: 77 BPM | RESPIRATION RATE: 18 BRPM

## 2018-10-16 DIAGNOSIS — Z98.890 OTHER SPECIFIED POSTPROCEDURAL STATES: Chronic | ICD-10-CM

## 2018-10-16 DIAGNOSIS — Z98.891 HISTORY OF UTERINE SCAR FROM PREVIOUS SURGERY: Chronic | ICD-10-CM

## 2018-10-16 DIAGNOSIS — Z85.3 PERSONAL HISTORY OF MALIGNANT NEOPLASM OF BREAST: ICD-10-CM

## 2018-10-16 PROCEDURE — 88342 IMHCHEM/IMCYTCHM 1ST ANTB: CPT | Mod: 26

## 2018-10-16 PROCEDURE — 19125 EXCISION BREAST LESION: CPT | Mod: LT

## 2018-10-16 PROCEDURE — 19281 PERQ DEVICE BREAST 1ST IMAG: CPT | Mod: LT

## 2018-10-16 PROCEDURE — 76098 X-RAY EXAM SURGICAL SPECIMEN: CPT | Mod: 26

## 2018-10-16 PROCEDURE — 88342 IMHCHEM/IMCYTCHM 1ST ANTB: CPT

## 2018-10-16 PROCEDURE — 88307 TISSUE EXAM BY PATHOLOGIST: CPT | Mod: 26

## 2018-10-16 PROCEDURE — 19281 PERQ DEVICE BREAST 1ST IMAG: CPT

## 2018-10-16 PROCEDURE — 76098 X-RAY EXAM SURGICAL SPECIMEN: CPT

## 2018-10-16 PROCEDURE — 19282 PERQ DEVICE BREAST EA IMAG: CPT

## 2018-10-16 PROCEDURE — 88307 TISSUE EXAM BY PATHOLOGIST: CPT

## 2018-10-16 RX ORDER — FENTANYL CITRATE 50 UG/ML
50 INJECTION INTRAVENOUS
Qty: 0 | Refills: 0 | Status: DISCONTINUED | OUTPATIENT
Start: 2018-10-16 | End: 2018-10-16

## 2018-10-16 RX ORDER — SODIUM CHLORIDE 9 MG/ML
1000 INJECTION, SOLUTION INTRAVENOUS
Qty: 0 | Refills: 0 | Status: DISCONTINUED | OUTPATIENT
Start: 2018-10-16 | End: 2018-10-16

## 2018-10-16 RX ORDER — HYDROMORPHONE HYDROCHLORIDE 2 MG/ML
1 INJECTION INTRAMUSCULAR; INTRAVENOUS; SUBCUTANEOUS
Qty: 0 | Refills: 0 | Status: DISCONTINUED | OUTPATIENT
Start: 2018-10-16 | End: 2018-10-16

## 2018-10-16 RX ORDER — ONDANSETRON 8 MG/1
4 TABLET, FILM COATED ORAL ONCE
Qty: 0 | Refills: 0 | Status: DISCONTINUED | OUTPATIENT
Start: 2018-10-16 | End: 2018-10-16

## 2018-10-16 RX ADMIN — FENTANYL CITRATE 50 MICROGRAM(S): 50 INJECTION INTRAVENOUS at 15:20

## 2018-10-16 RX ADMIN — FENTANYL CITRATE 50 MICROGRAM(S): 50 INJECTION INTRAVENOUS at 15:30

## 2018-10-16 RX ADMIN — SODIUM CHLORIDE 125 MILLILITER(S): 9 INJECTION, SOLUTION INTRAVENOUS at 15:48

## 2018-10-16 RX ADMIN — HYDROMORPHONE HYDROCHLORIDE 1 MILLIGRAM(S): 2 INJECTION INTRAMUSCULAR; INTRAVENOUS; SUBCUTANEOUS at 15:45

## 2018-10-16 RX ADMIN — FENTANYL CITRATE 50 MICROGRAM(S): 50 INJECTION INTRAVENOUS at 15:50

## 2018-10-16 NOTE — BRIEF OPERATIVE NOTE - POST-OP DX
Malignant neoplasm of upper-outer quadrant of left female breast, unspecified estrogen receptor status  10/16/2018  pending  Active  Charlie Farias

## 2018-10-16 NOTE — BRIEF OPERATIVE NOTE - PRE-OP DX
Malignant neoplasm of upper-outer quadrant of left female breast, unspecified estrogen receptor status  10/16/2018  [facility]  Active  Charlie Farias

## 2018-10-16 NOTE — ASU DISCHARGE PLAN (ADULT/PEDIATRIC). - MEDICATION SUMMARY - MEDICATIONS TO TAKE
I will START or STAY ON the medications listed below when I get home from the hospital:    predniSONE 10 mg oral tablet  -- 1 tab(s) by mouth once a day  -- Indication: For Home med    Percocet 5/325 oral tablet  -- 1 tab(s) by mouth every 6 hours, As Needed MDD:4 tabs  -- Caution federal law prohibits the transfer of this drug to any person other  than the person for whom it was prescribed.  May cause drowsiness.  Alcohol may intensify this effect.  Use care when operating dangerous machinery.  This prescription cannot be refilled.  This product contains acetaminophen.  Do not use  with any other product containing acetaminophen to prevent possible liver damage.  Using more of this medication than prescribed may cause serious breathing problems.    -- Indication: For pain    tramadol-acetaminophen 37.5 mg-325 mg oral tablet  -- 1 tab(s) by mouth every 4 hours, As Needed  -- Indication: For Home med    traZODone 50 mg oral tablet  -- 1.5 tab(s) by mouth once a day (at bedtime)  -- Indication: For Home med    loratadine 10 mg oral tablet  -- 1 tab(s) by mouth once a day, As Needed  -- Indication: For Home med    amlodipine-benazepril 5 mg-20 mg oral capsule  -- 1 cap(s) by mouth once a day  -- Indication: For Home med    benzonatate 200 mg oral capsule  -- 1 cap(s) by mouth 3 times a day  -- Indication: For Home med    ALPRAZolam 1 mg oral tablet  -- 1 tab(s) by mouth 3 times a day, As Needed  -- Indication: For Home med    metoprolol succinate 50 mg oral tablet, extended release  -- 1 tab(s) by mouth once a day  -- Indication: For Home med    Ventolin HFA 90 mcg/inh inhalation aerosol  -- 2 puff(s) inhaled 4 times a day, As Needed  -- Indication: For Home med    Trelegy Ellipta inhalation powder  -- 1 puff(s) inhaled once a day  -- Indication: For Home med    albuterol 2.5 mg/3 mL (0.083%) inhalation solution  -- 3 milliliter(s) inhaled every 2 hours, As Needed  -- Indication: For Home med

## 2018-10-24 ENCOUNTER — MEDICATION RENEWAL (OUTPATIENT)
Age: 58
End: 2018-10-24

## 2018-10-24 LAB — SURGICAL PATHOLOGY FINAL REPORT - CH: SIGNIFICANT CHANGE UP

## 2018-10-25 LAB — PATH REPORT ADDENDUM.SYNOPTIC DOC: SIGNIFICANT CHANGE UP

## 2018-10-29 ENCOUNTER — APPOINTMENT (OUTPATIENT)
Dept: MAMMOGRAPHY | Facility: CLINIC | Age: 58
End: 2018-10-29
Payer: MEDICAID

## 2018-10-29 ENCOUNTER — OUTPATIENT (OUTPATIENT)
Dept: OUTPATIENT SERVICES | Facility: HOSPITAL | Age: 58
LOS: 1 days | End: 2018-10-29
Payer: COMMERCIAL

## 2018-10-29 ENCOUNTER — APPOINTMENT (OUTPATIENT)
Dept: ULTRASOUND IMAGING | Facility: CLINIC | Age: 58
End: 2018-10-29
Payer: MEDICAID

## 2018-10-29 DIAGNOSIS — Z98.890 OTHER SPECIFIED POSTPROCEDURAL STATES: Chronic | ICD-10-CM

## 2018-10-29 DIAGNOSIS — Z98.891 HISTORY OF UTERINE SCAR FROM PREVIOUS SURGERY: Chronic | ICD-10-CM

## 2018-10-29 DIAGNOSIS — R92.8 OTHER ABNORMAL AND INCONCLUSIVE FINDINGS ON DIAGNOSTIC IMAGING OF BREAST: ICD-10-CM

## 2018-10-29 PROCEDURE — 77065 DX MAMMO INCL CAD UNI: CPT

## 2018-10-29 PROCEDURE — G0279: CPT | Mod: 26

## 2018-10-29 PROCEDURE — G0279: CPT

## 2018-10-29 PROCEDURE — 77065 DX MAMMO INCL CAD UNI: CPT | Mod: 26,LT

## 2018-10-30 ENCOUNTER — APPOINTMENT (OUTPATIENT)
Dept: INTERNAL MEDICINE | Facility: CLINIC | Age: 58
End: 2018-10-30
Payer: MEDICAID

## 2018-10-30 VITALS
OXYGEN SATURATION: 98 % | DIASTOLIC BLOOD PRESSURE: 80 MMHG | HEIGHT: 62 IN | SYSTOLIC BLOOD PRESSURE: 130 MMHG | TEMPERATURE: 98.5 F | BODY MASS INDEX: 34.23 KG/M2 | WEIGHT: 186 LBS | HEART RATE: 118 BPM

## 2018-10-30 DIAGNOSIS — Z87.09 PERSONAL HISTORY OF OTHER DISEASES OF THE RESPIRATORY SYSTEM: ICD-10-CM

## 2018-10-30 PROCEDURE — 99213 OFFICE O/P EST LOW 20 MIN: CPT

## 2018-11-12 ENCOUNTER — APPOINTMENT (OUTPATIENT)
Dept: ULTRASOUND IMAGING | Facility: CLINIC | Age: 58
End: 2018-11-12
Payer: MEDICAID

## 2018-11-12 ENCOUNTER — OUTPATIENT (OUTPATIENT)
Dept: OUTPATIENT SERVICES | Facility: HOSPITAL | Age: 58
LOS: 1 days | End: 2018-11-12
Payer: COMMERCIAL

## 2018-11-12 DIAGNOSIS — Z98.891 HISTORY OF UTERINE SCAR FROM PREVIOUS SURGERY: Chronic | ICD-10-CM

## 2018-11-12 DIAGNOSIS — R92.8 OTHER ABNORMAL AND INCONCLUSIVE FINDINGS ON DIAGNOSTIC IMAGING OF BREAST: ICD-10-CM

## 2018-11-12 DIAGNOSIS — Z98.890 OTHER SPECIFIED POSTPROCEDURAL STATES: Chronic | ICD-10-CM

## 2018-11-12 PROCEDURE — 76642 ULTRASOUND BREAST LIMITED: CPT

## 2018-11-12 PROCEDURE — 76642 ULTRASOUND BREAST LIMITED: CPT | Mod: 26,LT

## 2018-11-15 ENCOUNTER — MEDICATION RENEWAL (OUTPATIENT)
Age: 58
End: 2018-11-15

## 2018-11-27 ENCOUNTER — APPOINTMENT (OUTPATIENT)
Dept: INTERNAL MEDICINE | Facility: CLINIC | Age: 58
End: 2018-11-27

## 2018-12-03 ENCOUNTER — APPOINTMENT (OUTPATIENT)
Dept: CARDIOLOGY | Facility: CLINIC | Age: 58
End: 2018-12-03

## 2018-12-04 ENCOUNTER — NON-APPOINTMENT (OUTPATIENT)
Age: 58
End: 2018-12-04

## 2018-12-04 ENCOUNTER — APPOINTMENT (OUTPATIENT)
Dept: INTERNAL MEDICINE | Facility: CLINIC | Age: 58
End: 2018-12-04
Payer: MEDICAID

## 2018-12-04 ENCOUNTER — APPOINTMENT (OUTPATIENT)
Dept: CARDIOLOGY | Facility: CLINIC | Age: 58
End: 2018-12-04
Payer: MEDICAID

## 2018-12-04 VITALS
SYSTOLIC BLOOD PRESSURE: 123 MMHG | OXYGEN SATURATION: 97 % | HEIGHT: 62 IN | BODY MASS INDEX: 34.6 KG/M2 | DIASTOLIC BLOOD PRESSURE: 74 MMHG | WEIGHT: 188 LBS | HEART RATE: 75 BPM

## 2018-12-04 VITALS
TEMPERATURE: 97.8 F | WEIGHT: 188 LBS | SYSTOLIC BLOOD PRESSURE: 130 MMHG | OXYGEN SATURATION: 98 % | HEART RATE: 74 BPM | HEIGHT: 62 IN | DIASTOLIC BLOOD PRESSURE: 80 MMHG | BODY MASS INDEX: 34.6 KG/M2

## 2018-12-04 DIAGNOSIS — B37.0 CANDIDAL STOMATITIS: ICD-10-CM

## 2018-12-04 PROCEDURE — 99213 OFFICE O/P EST LOW 20 MIN: CPT

## 2018-12-04 PROCEDURE — 93000 ELECTROCARDIOGRAM COMPLETE: CPT

## 2018-12-04 PROCEDURE — 99214 OFFICE O/P EST MOD 30 MIN: CPT | Mod: 25

## 2018-12-04 RX ORDER — PREDNISONE 20 MG/1
20 TABLET ORAL DAILY
Qty: 6 | Refills: 0 | Status: DISCONTINUED | COMMUNITY
Start: 2018-10-11 | End: 2018-12-04

## 2018-12-06 ENCOUNTER — OUTPATIENT (OUTPATIENT)
Dept: OUTPATIENT SERVICES | Facility: HOSPITAL | Age: 58
LOS: 1 days | End: 2018-12-06
Payer: COMMERCIAL

## 2018-12-06 VITALS
TEMPERATURE: 98 F | HEIGHT: 62 IN | WEIGHT: 185.19 LBS | RESPIRATION RATE: 16 BRPM | HEART RATE: 80 BPM | DIASTOLIC BLOOD PRESSURE: 75 MMHG | SYSTOLIC BLOOD PRESSURE: 126 MMHG

## 2018-12-06 DIAGNOSIS — Z98.890 OTHER SPECIFIED POSTPROCEDURAL STATES: Chronic | ICD-10-CM

## 2018-12-06 DIAGNOSIS — Z01.818 ENCOUNTER FOR OTHER PREPROCEDURAL EXAMINATION: ICD-10-CM

## 2018-12-06 DIAGNOSIS — J44.9 CHRONIC OBSTRUCTIVE PULMONARY DISEASE, UNSPECIFIED: ICD-10-CM

## 2018-12-06 DIAGNOSIS — Z13.89 ENCOUNTER FOR SCREENING FOR OTHER DISORDER: ICD-10-CM

## 2018-12-06 DIAGNOSIS — F41.9 ANXIETY DISORDER, UNSPECIFIED: ICD-10-CM

## 2018-12-06 DIAGNOSIS — Z98.891 HISTORY OF UTERINE SCAR FROM PREVIOUS SURGERY: Chronic | ICD-10-CM

## 2018-12-06 LAB
ANION GAP SERPL CALC-SCNC: 13 MMOL/L — SIGNIFICANT CHANGE UP (ref 5–17)
BUN SERPL-MCNC: 16 MG/DL — SIGNIFICANT CHANGE UP (ref 8–20)
CALCIUM SERPL-MCNC: 9.4 MG/DL — SIGNIFICANT CHANGE UP (ref 8.6–10.2)
CHLORIDE SERPL-SCNC: 98 MMOL/L — SIGNIFICANT CHANGE UP (ref 98–107)
CO2 SERPL-SCNC: 28 MMOL/L — SIGNIFICANT CHANGE UP (ref 22–29)
CREAT SERPL-MCNC: 0.83 MG/DL — SIGNIFICANT CHANGE UP (ref 0.5–1.3)
GLUCOSE SERPL-MCNC: 164 MG/DL — HIGH (ref 70–115)
HCT VFR BLD CALC: 42.5 % — SIGNIFICANT CHANGE UP (ref 37–47)
HGB BLD-MCNC: 14 G/DL — SIGNIFICANT CHANGE UP (ref 12–16)
MCHC RBC-ENTMCNC: 32 PG — HIGH (ref 27–31)
MCHC RBC-ENTMCNC: 32.9 G/DL — SIGNIFICANT CHANGE UP (ref 32–36)
MCV RBC AUTO: 97.3 FL — SIGNIFICANT CHANGE UP (ref 81–99)
PLATELET # BLD AUTO: 315 K/UL — SIGNIFICANT CHANGE UP (ref 150–400)
POTASSIUM SERPL-MCNC: 3.6 MMOL/L — SIGNIFICANT CHANGE UP (ref 3.5–5.3)
POTASSIUM SERPL-SCNC: 3.6 MMOL/L — SIGNIFICANT CHANGE UP (ref 3.5–5.3)
RBC # BLD: 4.37 M/UL — LOW (ref 4.4–5.2)
RBC # FLD: 13.4 % — SIGNIFICANT CHANGE UP (ref 11–15.6)
SODIUM SERPL-SCNC: 139 MMOL/L — SIGNIFICANT CHANGE UP (ref 135–145)
WBC # BLD: 6 K/UL — SIGNIFICANT CHANGE UP (ref 4.8–10.8)
WBC # FLD AUTO: 6 K/UL — SIGNIFICANT CHANGE UP (ref 4.8–10.8)

## 2018-12-06 PROCEDURE — 85027 COMPLETE CBC AUTOMATED: CPT

## 2018-12-06 PROCEDURE — 80048 BASIC METABOLIC PNL TOTAL CA: CPT

## 2018-12-06 PROCEDURE — G0463: CPT

## 2018-12-06 PROCEDURE — 36415 COLL VENOUS BLD VENIPUNCTURE: CPT

## 2018-12-06 RX ORDER — AMLODIPINE BESYLATE AND BENAZEPRIL HYDROCHLORIDE 10; 20 MG/1; MG/1
1 CAPSULE ORAL
Qty: 0 | Refills: 0 | COMMUNITY

## 2018-12-06 NOTE — H&P PST ADULT - BREASTS COMMENTS
HPI:   Sarah Adams is a 48year old male who presents for a Medicare Subsequent Annual Wellness visit (Pt already had Initial Annual Wellness). Patient is here for annual Medicare wellness visit. Follow-up on chronic medical issues.   Last seen here  03/24/2015        ALLERGIES:   He is allergic to cefazolin and cephalosporins.     CURRENT MEDICATIONS:     Outpatient Prescriptions Marked as Taking for the 4/24/17 encounter (Office Visit) with Ham Vu MD:  aspirin 81 MG Oral Tab Take 8 mass index is 22.47 kg/(m^2) as calculated from the following:    Height as of this encounter: 5' 4\" (1.626 m). Weight as of this encounter: 131 lb (59.421 kg).     Medicare Hearing Assessment  (Required for AWV/SWV)    Hearing Screening    Screening Me ear canals, both ears   Nose: Nares normal, septum midline, mucosa normal, no drainage or sinus tenderness   Throat: Lips, mucosa, and tongue normal; teeth and gums normal   Neck: Supple, symmetrical, trachea midline, no adenopathy, thyroid: not enlarged, with neurology.   Check blood work.    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    (Z12.11) Screen for Functional Ability     Bathing or Showering: Able without help    Toileting: Able without help    Dressing: Able without help    Eating: Able without help    Driving: Cannot do without help    Preparing your meals: Need some help    Managing money/bill SERVICES  INDICATIONS AND SCHEDULE Internal Lab or Procedure External Lab or Procedure   Diabetes Screening      HbgA1C   Annually No results found for: A1C    No flowsheet data found.     Fasting Blood Sugar (FSB)Annually   GLUCOSE (P) (mg/dL)   Date Value DIG, VALP No flowsheet data found. Diabetes      HgbA1C  Annually No results found for: A1C    No flowsheet data found. Creat/alb ratio  Annually      LDL  Annually LDL CHOLESTEROL (mg/dL)   Date Value   03/24/2015 125*    No flowsheet data found. Patient Refused

## 2018-12-06 NOTE — H&P PST ADULT - PSH
H/O colonoscopy with polypectomy    H/O:   2000 H/O colonoscopy with polypectomy    H/O lumpectomy  left 2018  H/O:   2000

## 2018-12-06 NOTE — H&P PST ADULT - HISTORY OF PRESENT ILLNESS
57 year old female with left breast cancer scheduled for a lumpectomy 58 year old female with left breast cancer scheduled for a lumpectomy reexcision with needle localization.

## 2018-12-06 NOTE — H&P PST ADULT - OTHER CARE PROVIDERS
DR. Hopson ( pulmonary)  [phone] DR. Quintero (cardiology) [phone] DR. Hopson ( pulmonary)  [phone] will see Dr. Rivera for cardiac clearance pending

## 2018-12-06 NOTE — H&P PST ADULT - PMH
Anxiety    Asthma    Bipolar 1 disorder  no therapy  COPD (chronic obstructive pulmonary disease)  emphysema  Depressed    Diverticulosis    Gall bladder disease    Hypertension    Kidney stone    Lipoma  left abdomen / hip  Lipoma of abdominal wall  left lateral

## 2018-12-06 NOTE — H&P PST ADULT - ASSESSMENT
medications reviewed, instructions given on what medications to take and what not to take. Asked the patient to take the Blood pressure medication/ heart medication on DOP.   CAPRINI SCORE [CLOT]    AGE RELATED RISK FACTORS                                                       MOBILITY RELATED FACTORS  [x ] Age 41-60 years                                            (1 Point)                  [ ] Bed rest                                                        (1 Point)  [ ] Age: 61-74 years                                           (2 Points)                 [ ] Plaster cast                                                   (2 Points)  [ ] Age= 75 years                                              (3 Points)                 [ ] Bed bound for more than 72 hours                 (2 Points)    DISEASE RELATED RISK FACTORS                                               GENDER SPECIFIC FACTORS  [ ] Edema in the lower extremities                       (1 Point)                  [ ] Pregnancy                                                     (1 Point)  [ ] Varicose veins                                               (1 Point)                  [ ] Post-partum < 6 weeks                                   (1 Point)             [x ] BMI > 25 Kg/m2                                            (1 Point)                  [ ] Hormonal therapy  or oral contraception          (1 Point)                 [ ] Sepsis (in the previous month)                        (1 Point)                  [ ] History of pregnancy complications                 (1 point)  [ ] Pneumonia or serious lung disease                                               [ ] Unexplained or recurrent                     (1 Point)           (in the previous month)                               (1 Point)  [x ] Abnormal pulmonary function test                     (1 Point)                 SURGERY RELATED RISK FACTORS  [ ] Acute myocardial infarction                              (1 Point)                 [ ]  Section                                             (1 Point)  [ ] Congestive heart failure (in the previous month)  (1 Point)               [ ] Minor surgery                                                  (1 Point)   [ ] Inflammatory bowel disease                             (1 Point)                 [ ] Arthroscopic surgery                                        (2 Points)  [ ] Central venous access                                      (2 Points)                [x ] General surgery lasting more than 45 minutes   (2 Points)       [ ] Stroke (in the previous month)                          (5 Points)               [ ] Elective arthroplasty                                         (5 Points)                                                                                                                                               HEMATOLOGY RELATED FACTORS                                                 TRAUMA RELATED RISK FACTORS  [ ] Prior episodes of VTE                                     (3 Points)                 [ ] Fracture of the hip, pelvis, or leg                       (5 Points)  [ ] Positive family history for VTE                         (3 Points)                 [ ] Acute spinal cord injury (in the previous month)  (5 Points)  [ ] Prothrombin 59849 A                                     (3 Points)                 [ ] Paralysis  (less than 1 month)                             (5 Points)  [ ] Factor V Leiden                                             (3 Points)                  [ ] Multiple Trauma within 1 month                        (5 Points)  [ ] Lupus anticoagulants                                     (3 Points)                                                           [ ] Anticardiolipin antibodies                               (3 Points)                                                       [ ] High homocysteine in the blood                      (3 Points)                                             [ ] Other congenital or acquired thrombophilia      (3 Points)                                                [ ] Heparin induced thrombocytopenia                  (3 Points)                                          Total Score [ 5        ] medications reviewed, instructions given on what medications to take and what not to take. Asked the patient to take the Blood pressure medication/ heart medication on DOP.   CAPRINI SCORE [CLOT]    AGE RELATED RISK FACTORS                                                       MOBILITY RELATED FACTORS  [x ] Age 41-60 years                                            (1 Point)                  [ ] Bed rest                                                        (1 Point)  [ ] Age: 61-74 years                                           (2 Points)                 [ ] Plaster cast                                                   (2 Points)  [ ] Age= 75 years                                              (3 Points)                 [ ] Bed bound for more than 72 hours                 (2 Points)    DISEASE RELATED RISK FACTORS                                               GENDER SPECIFIC FACTORS  [ ] Edema in the lower extremities                       (1 Point)                  [ ] Pregnancy                                                     (1 Point)  [ ] Varicose veins                                               (1 Point)                  [ ] Post-partum < 6 weeks                                   (1 Point)             [x ] BMI > 25 Kg/m2                                            (1 Point)                  [ ] Hormonal therapy  or oral contraception          (1 Point)                 [ ] Sepsis (in the previous month)                        (1 Point)                  [ ] History of pregnancy complications                 (1 point)  [ ] Pneumonia or serious lung disease                                               [ ] Unexplained or recurrent                     (1 Point)           (in the previous month)                               (1 Point)  [x ] Abnormal pulmonary function test                     (1 Point)                 SURGERY RELATED RISK FACTORS  [ ] Acute myocardial infarction                              (1 Point)                 [ ]  Section                                             (1 Point)  [ ] Congestive heart failure (in the previous month)  (1 Point)               [ ] Minor surgery                                                  (1 Point)   [ ] Inflammatory bowel disease                             (1 Point)                 [ ] Arthroscopic surgery                                        (2 Points)  [ ] Central venous access                                      (2 Points)                [x ] General surgery lasting more than 45 minutes   (2 Points)       [ ] Stroke (in the previous month)                          (5 Points)               [ ] Elective arthroplasty                                         (5 Points)                                                                                                                                               HEMATOLOGY RELATED FACTORS                                                 TRAUMA RELATED RISK FACTORS  [ ] Prior episodes of VTE                                     (3 Points)                 [ ] Fracture of the hip, pelvis, or leg                       (5 Points)  [ ] Positive family history for VTE                         (3 Points)                 [ ] Acute spinal cord injury (in the previous month)  (5 Points)  [ ] Prothrombin 97917 A                                     (3 Points)                 [ ] Paralysis  (less than 1 month)                             (5 Points)  [ ] Factor V Leiden                                             (3 Points)                  [ ] Multiple Trauma within 1 month                        (5 Points)  [ ] Lupus anticoagulants                                     (3 Points)                                                           [ ] Anticardiolipin antibodies                               (3 Points)                                                       [ ] High homocysteine in the blood                      (3 Points)                                             [ ] Other congenital or acquired thrombophilia      (3 Points)                                                [ ] Heparin induced thrombocytopenia                  (3 Points)                                          Total Score [ 5        ]    OPIOID RISK TOOL    RENE EACH BOX THAT APPLIES AND ADD TOTALS AT THE END    FAMILY HISTORY OF SUBSTANCE ABUSE                 FEMALE         MALE                                                Alcohol                             [  x]1 pt          [  ]3pts                                               Illegal Drugs                     [  ]2 pts        [  ]3pts                                               Rx Drugs                           [  ]4 pts        [  ]4 pts    PERSONAL HISTORY OF SUBSTANCE ABUSE                                                                                          Alcohol                             [  ]3 pts       [  ]3 pts                                               Illegal Drugs                     [ x ]4 pts        [  ]4 pts                                               Rx Drugs                           [  ]5 pts        [  ]5 pts    AGE BETWEEN 16-45 YEARS                                      [  ]1 pt         [  ]1 pt    HISTORY OF PREADOLESCENT   SEXUAL ABUSE                                                             [  ]3 pts        [  ]0pts    PSYCHOLOGICAL DISEASE                     ADD, OCD, Bipolar, Schizophrenia        [ x ]2 pts         [  ]2 pts                      Depression                                               [  ]1 pt           [  ]1 pt           SCORING TOTAL   (add numbers and type here)              ( 7  )                                     A score of 3 or lower indicated LOW risk for future opioid abuse  A score of 4 to 7 indicated moderate risk for future opioid abuse  A score of 8 or higher indicates a high risk for opioid abuse

## 2018-12-10 ENCOUNTER — OTHER (OUTPATIENT)
Age: 58
End: 2018-12-10

## 2018-12-10 ENCOUNTER — APPOINTMENT (OUTPATIENT)
Dept: PULMONOLOGY | Facility: CLINIC | Age: 58
End: 2018-12-10
Payer: MEDICAID

## 2018-12-10 ENCOUNTER — APPOINTMENT (OUTPATIENT)
Dept: INTERNAL MEDICINE | Facility: CLINIC | Age: 58
End: 2018-12-10
Payer: MEDICAID

## 2018-12-10 ENCOUNTER — MEDICATION RENEWAL (OUTPATIENT)
Age: 58
End: 2018-12-10

## 2018-12-10 VITALS
HEIGHT: 62 IN | OXYGEN SATURATION: 95 % | SYSTOLIC BLOOD PRESSURE: 130 MMHG | WEIGHT: 182 LBS | DIASTOLIC BLOOD PRESSURE: 80 MMHG | BODY MASS INDEX: 33.49 KG/M2 | HEART RATE: 88 BPM | TEMPERATURE: 98.3 F

## 2018-12-10 VITALS — HEART RATE: 88 BPM | DIASTOLIC BLOOD PRESSURE: 72 MMHG | OXYGEN SATURATION: 94 % | SYSTOLIC BLOOD PRESSURE: 120 MMHG

## 2018-12-10 VITALS — RESPIRATION RATE: 16 BRPM

## 2018-12-10 DIAGNOSIS — Z01.818 ENCOUNTER FOR OTHER PREPROCEDURAL EXAMINATION: ICD-10-CM

## 2018-12-10 PROCEDURE — 99214 OFFICE O/P EST MOD 30 MIN: CPT | Mod: 25

## 2018-12-10 PROCEDURE — 99215 OFFICE O/P EST HI 40 MIN: CPT

## 2018-12-10 PROCEDURE — 94010 BREATHING CAPACITY TEST: CPT

## 2018-12-10 RX ORDER — ACLIDINIUM BROMIDE 400 UG/1
400 POWDER, METERED RESPIRATORY (INHALATION) TWICE DAILY
Qty: 3 | Refills: 3 | Status: DISCONTINUED | COMMUNITY
Start: 2018-01-22 | End: 2018-12-10

## 2018-12-10 RX ORDER — PREDNISONE 10 MG/1
10 TABLET ORAL
Qty: 30 | Refills: 1 | Status: DISCONTINUED | COMMUNITY
Start: 2018-08-15 | End: 2018-12-10

## 2018-12-10 RX ORDER — MOMETASONE FUROATE AND FORMOTEROL FUMARATE DIHYDRATE 100; 5 UG/1; UG/1
100-5 AEROSOL RESPIRATORY (INHALATION)
Qty: 3 | Refills: 3 | Status: DISCONTINUED | COMMUNITY
Start: 2018-10-11 | End: 2018-12-10

## 2018-12-10 RX ORDER — BETAMETHASONE DIPROPIONATE 0.5 MG/G
0.05 LOTION TOPICAL TWICE DAILY
Qty: 1 | Refills: 2 | Status: DISCONTINUED | COMMUNITY
Start: 2018-07-11 | End: 2018-12-10

## 2018-12-10 RX ORDER — AMLODIPINE BESYLATE AND BENAZEPRIL HYDROCHLORIDE 10; 40 MG/1; MG/1
10-40 CAPSULE ORAL
Refills: 0 | Status: DISCONTINUED | COMMUNITY
End: 2018-12-10

## 2018-12-10 RX ORDER — CLOTRIMAZOLE 10 MG/ML
1 SOLUTION TOPICAL TWICE DAILY
Qty: 1 | Refills: 2 | Status: DISCONTINUED | COMMUNITY
Start: 2018-07-11 | End: 2018-12-10

## 2018-12-10 RX ORDER — AMLODIPINE BESYLATE AND BENAZEPRIL HYDROCHLORIDE 5; 10 MG/1; MG/1
5-10 CAPSULE ORAL
Qty: 90 | Refills: 1 | Status: DISCONTINUED | COMMUNITY
Start: 2017-02-07 | End: 2018-12-10

## 2018-12-11 ENCOUNTER — MEDICATION RENEWAL (OUTPATIENT)
Age: 58
End: 2018-12-11

## 2018-12-11 ENCOUNTER — RX RENEWAL (OUTPATIENT)
Age: 58
End: 2018-12-11

## 2018-12-11 NOTE — PHYSICAL EXAM
[No Deformities] : no deformities [General Appearance - In No Acute Distress] : no acute distress [Normal Conjunctiva] : the conjunctiva exhibited no abnormalities [No Oral Pallor] : no oral pallor [No Oral Cyanosis] : no oral cyanosis [Normal Jugular Venous A Waves Present] : normal jugular venous A waves present [Normal Jugular Venous V Waves Present] : normal jugular venous V waves present [Respiration, Rhythm And Depth] : normal respiratory rhythm and effort [Exaggerated Use Of Accessory Muscles For Inspiration] : no accessory muscle use [Auscultation Breath Sounds / Voice Sounds] : lungs were clear to auscultation bilaterally [Heart Rate And Rhythm] : heart rate and rhythm were normal [Heart Sounds] : normal S1 and S2 [Murmurs] : no murmurs present [Bowel Sounds] : normal bowel sounds [Abdomen Soft] : soft [Abnormal Walk] : normal gait [Nail Clubbing] : no clubbing of the fingernails [Cyanosis, Localized] : no localized cyanosis [Skin Color & Pigmentation] : normal skin color and pigmentation [] : no rash [Oriented To Time, Place, And Person] : oriented to person, place, and time [Affect] : the affect was normal [No Anxiety] : not feeling anxious [FreeTextEntry1] : LE edema L>R

## 2018-12-11 NOTE — DISCUSSION/SUMMARY
[FreeTextEntry1] : Discussed with Dr. Rivera\par \par Plan\par 1. HTN- at goal, continue meds\par 2. COPD- follows with Dr. Hopson\par 3. LE edema- pt has not taken lasix in couple of weeks, restart lasix and KCL as directed\par 4. cardiac preoperative risk stratification- no known ischemic heart disease, normal nuclear stress test/echo in 8/2018- patient is optimized from a cardiac standpoint for her upcoming surgery\par 5. follow with pmd for ?thrush and clearance and pulmonology\par 6. follow up with Dr. Rivera\par \par JENELLE Orellana

## 2018-12-11 NOTE — HISTORY OF PRESENT ILLNESS
[FreeTextEntry1] : Kemi Pérez is a 57 year old female with history COPD, former smoker (quit 2 months), HTN, and bipolar disorder. She presents for cardiac risk stratification. She was diagnosed with left breast cancer 4-5 weeks. She will undergo mastectomy with Dr. Farias. She will also needs abd surgery for hernia repair in future. She denies CP, palpitations, SOB, PND, edema, fatigue, near syncope or syncope. + mild SOSA with h/o COPD. LE edema resolved with lasix. Reviewed normal nuclear stress test results and echocardiogram results with patient. LVEF 65-70%.  \par \par 12/4/2018\par Patient presents for preoperative risk stratification. She is scheduled for left breast lumpectomy on 12/14/2018 with Dr. Farias at [facility]. Following surgery she will  start radiation therapy. She denies CP, palpitations, SOB at rest, near syncope or syncope. She has chronic SOSA secondary to COPD. + LE edema. She states she has not taken her lasix in a couple of weeks. She has PST on 12/6 and has preop clearance with pulmonary and pmd next week.

## 2018-12-13 ENCOUNTER — TRANSCRIPTION ENCOUNTER (OUTPATIENT)
Age: 58
End: 2018-12-13

## 2018-12-14 ENCOUNTER — RESULT REVIEW (OUTPATIENT)
Age: 58
End: 2018-12-14

## 2018-12-14 ENCOUNTER — TRANSCRIPTION ENCOUNTER (OUTPATIENT)
Age: 58
End: 2018-12-14

## 2018-12-14 ENCOUNTER — OUTPATIENT (OUTPATIENT)
Dept: OUTPATIENT SERVICES | Facility: HOSPITAL | Age: 58
LOS: 1 days | End: 2018-12-14
Payer: COMMERCIAL

## 2018-12-14 VITALS
OXYGEN SATURATION: 100 % | RESPIRATION RATE: 16 BRPM | SYSTOLIC BLOOD PRESSURE: 130 MMHG | DIASTOLIC BLOOD PRESSURE: 78 MMHG | HEART RATE: 80 BPM

## 2018-12-14 VITALS
HEART RATE: 80 BPM | WEIGHT: 182.1 LBS | DIASTOLIC BLOOD PRESSURE: 89 MMHG | SYSTOLIC BLOOD PRESSURE: 150 MMHG | HEIGHT: 64 IN | TEMPERATURE: 97 F | RESPIRATION RATE: 16 BRPM | OXYGEN SATURATION: 100 %

## 2018-12-14 DIAGNOSIS — Z85.3 PERSONAL HISTORY OF MALIGNANT NEOPLASM OF BREAST: ICD-10-CM

## 2018-12-14 DIAGNOSIS — Z98.891 HISTORY OF UTERINE SCAR FROM PREVIOUS SURGERY: Chronic | ICD-10-CM

## 2018-12-14 DIAGNOSIS — Z98.890 OTHER SPECIFIED POSTPROCEDURAL STATES: Chronic | ICD-10-CM

## 2018-12-14 PROCEDURE — 88304 TISSUE EXAM BY PATHOLOGIST: CPT

## 2018-12-14 PROCEDURE — 88307 TISSUE EXAM BY PATHOLOGIST: CPT

## 2018-12-14 PROCEDURE — A9541: CPT

## 2018-12-14 PROCEDURE — 78195 LYMPH SYSTEM IMAGING: CPT | Mod: 26

## 2018-12-14 PROCEDURE — 88304 TISSUE EXAM BY PATHOLOGIST: CPT | Mod: 26

## 2018-12-14 PROCEDURE — 88333 PATH CONSLTJ SURG CYTO XM 1: CPT | Mod: 26

## 2018-12-14 PROCEDURE — 88307 TISSUE EXAM BY PATHOLOGIST: CPT | Mod: 26

## 2018-12-14 PROCEDURE — 88333 PATH CONSLTJ SURG CYTO XM 1: CPT

## 2018-12-14 PROCEDURE — 19125 EXCISION BREAST LESION: CPT | Mod: LT

## 2018-12-14 PROCEDURE — 78195 LYMPH SYSTEM IMAGING: CPT

## 2018-12-14 PROCEDURE — 38525 BIOPSY/REMOVAL LYMPH NODES: CPT | Mod: LT

## 2018-12-14 PROCEDURE — 76098 X-RAY EXAM SURGICAL SPECIMEN: CPT

## 2018-12-14 PROCEDURE — 19281 PERQ DEVICE BREAST 1ST IMAG: CPT | Mod: LT

## 2018-12-14 PROCEDURE — 76098 X-RAY EXAM SURGICAL SPECIMEN: CPT | Mod: 26

## 2018-12-14 PROCEDURE — 19281 PERQ DEVICE BREAST 1ST IMAG: CPT

## 2018-12-14 RX ORDER — SODIUM CHLORIDE 9 MG/ML
1000 INJECTION, SOLUTION INTRAVENOUS
Qty: 0 | Refills: 0 | Status: DISCONTINUED | OUTPATIENT
Start: 2018-12-14 | End: 2018-12-14

## 2018-12-14 RX ORDER — DEXAMETHASONE 0.5 MG/5ML
4 ELIXIR ORAL ONCE
Qty: 0 | Refills: 0 | Status: DISCONTINUED | OUTPATIENT
Start: 2018-12-14 | End: 2018-12-14

## 2018-12-14 RX ORDER — HYDROMORPHONE HYDROCHLORIDE 2 MG/ML
0.5 INJECTION INTRAMUSCULAR; INTRAVENOUS; SUBCUTANEOUS
Qty: 0 | Refills: 0 | Status: DISCONTINUED | OUTPATIENT
Start: 2018-12-14 | End: 2018-12-14

## 2018-12-14 RX ORDER — SODIUM CHLORIDE 9 MG/ML
3 INJECTION INTRAMUSCULAR; INTRAVENOUS; SUBCUTANEOUS EVERY 8 HOURS
Qty: 0 | Refills: 0 | Status: DISCONTINUED | OUTPATIENT
Start: 2018-12-14 | End: 2018-12-14

## 2018-12-14 RX ORDER — FENTANYL CITRATE 50 UG/ML
25 INJECTION INTRAVENOUS
Qty: 0 | Refills: 0 | Status: DISCONTINUED | OUTPATIENT
Start: 2018-12-14 | End: 2018-12-14

## 2018-12-14 RX ORDER — ONDANSETRON 8 MG/1
4 TABLET, FILM COATED ORAL ONCE
Qty: 0 | Refills: 0 | Status: DISCONTINUED | OUTPATIENT
Start: 2018-12-14 | End: 2018-12-14

## 2018-12-14 RX ORDER — DEXAMETHASONE 0.5 MG/5ML
4 ELIXIR ORAL ONCE
Qty: 0 | Refills: 0 | Status: COMPLETED | OUTPATIENT
Start: 2018-12-14 | End: 2018-12-14

## 2018-12-14 RX ADMIN — HYDROMORPHONE HYDROCHLORIDE 0.5 MILLIGRAM(S): 2 INJECTION INTRAMUSCULAR; INTRAVENOUS; SUBCUTANEOUS at 17:24

## 2018-12-14 RX ADMIN — HYDROMORPHONE HYDROCHLORIDE 0.5 MILLIGRAM(S): 2 INJECTION INTRAMUSCULAR; INTRAVENOUS; SUBCUTANEOUS at 17:09

## 2018-12-14 RX ADMIN — HYDROMORPHONE HYDROCHLORIDE 0.5 MILLIGRAM(S): 2 INJECTION INTRAMUSCULAR; INTRAVENOUS; SUBCUTANEOUS at 17:34

## 2018-12-14 NOTE — BRIEF OPERATIVE NOTE - PRE-OP DX
Malignant neoplasm of central portion of left female breast, unspecified estrogen receptor status  12/14/2018    Active  Charlie Farias

## 2018-12-14 NOTE — DISCHARGE NOTE ADULT - PATIENT PORTAL LINK FT
You can access the "[employer] Patient Portal, offered by [facility], by registering with the following website: [email or web address]

## 2018-12-14 NOTE — DISCHARGE NOTE ADULT - PLAN OF CARE
wound healing/pain control resume same diet as prior to admission, follow up with Dr. Farias in the office on Monday, empty your drains and record the output every 12 hours, no heavy lifting>10 lbs until seen in the office.  You have been prescribed a narcotic pain medicine.  Do not drive, operate machinery, drink alcohol or make important decisions while taking this medication.  As an alternative or in conjunction with the prescribed pain medication you may take tylenol or Motrin as directed.  Please call Dr. Farias's office with any concerns or return to the ED for persistent pain not relieved  with the prescribed pain medicine, uncontrolled bleeding or drainage form the drains, signs of infection at the surgical site such as increasing redness, pus drainage, persistent fevers and/or chills.

## 2018-12-14 NOTE — DISCHARGE NOTE ADULT - HOSPITAL COURSE
58F with multiple medical problems presented today for elective L Breast Lumpectomy and LN dissection 2/2 Breast Ca.  Pt is post op this uncomplicated procedure and cleared for discharge at this time by her attending surgeon Dr. Farias.

## 2018-12-14 NOTE — DISCHARGE NOTE ADULT - CARE PLAN
Principal Discharge DX:	Breast mass  Goal:	wound healing/pain control  Assessment and plan of treatment:	resume same diet as prior to admission, follow up with Dr. Farias in the office on Monday, empty your drains and record the output every 12 hours, no heavy lifting>10 lbs until seen in the office.  You have been prescribed a narcotic pain medicine.  Do not drive, operate machinery, drink alcohol or make important decisions while taking this medication.  As an alternative or in conjunction with the prescribed pain medication you may take tylenol or Motrin as directed.  Please call Dr. Farias's office with any concerns or return to the ED for persistent pain not relieved  with the prescribed pain medicine, uncontrolled bleeding or drainage form the drains, signs of infection at the surgical site such as increasing redness, pus drainage, persistent fevers and/or chills.

## 2018-12-14 NOTE — BRIEF OPERATIVE NOTE - POST-OP DX
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 Breast cancer in female  12/14/2018    Active  Charlie Farias

## 2018-12-14 NOTE — BRIEF OPERATIVE NOTE - OPERATION/FINDINGS
inject blue dye  excision SLN L axilla, and non sentinal LN  L breast reexcision lumpectomy, PC  placement drains x 2  excision old scar

## 2018-12-14 NOTE — DISCHARGE NOTE ADULT - CARE PROVIDER_API CALL
Charlie Farias), Surgery  [address]  [address]  [place] [address]  Phone: [phone]  Fax: [phone]

## 2018-12-14 NOTE — ASU DISCHARGE PLAN (ADULT/PEDIATRIC). - MEDICATION SUMMARY - MEDICATIONS TO TAKE
I will START or STAY ON the medications listed below when I get home from the hospital:    tramadol-acetaminophen 37.5 mg-325 mg oral tablet  -- 1 tab(s) by mouth every 4 hours, As Needed -for moderate pain MDD:6   -- Indication: For per PMD    traZODone 50 mg oral tablet  -- 1.5 tab(s) by mouth once a day (at bedtime)  -- Indication: For per PMD    loratadine 10 mg oral tablet  -- 1 tab(s) by mouth once a day, As Needed  -- Indication: For per PMD    amlodipine-benazepril 10 mg-40 mg oral capsule  -- 1 cap(s) by mouth once a day  -- Indication: For per PMD    benzonatate 200 mg oral capsule  -- 1 cap(s) by mouth 3 times a day  -- Indication: For per PMD    ALPRAZolam 1 mg oral tablet  -- 1 tab(s) by mouth 3 times a day, As Needed  -- Indication: For per PMD    metoprolol succinate 50 mg oral tablet, extended release  -- 1 tab(s) by mouth once a day  -- Indication: For per PMD    Ventolin HFA 90 mcg/inh inhalation aerosol  -- 2 puff(s) inhaled 4 times a day, As Needed  -- Indication: For per PMD    Trelegy Ellipta inhalation powder  -- 1 puff(s) inhaled once a day  -- Indication: For per PMD    albuterol 2.5 mg/3 mL (0.083%) inhalation solution  -- 3 milliliter(s) inhaled every 2 hours, As Needed  -- Indication: For per PMD I will START or STAY ON the medications listed below when I get home from the hospital:    Percocet 5/325 oral tablet  -- 1 tab(s) by mouth every 6 hours, As Needed -for moderate pain MDD:5  -- Caution federal law prohibits the transfer of this drug to any person other  than the person for whom it was prescribed.  May cause drowsiness.  Alcohol may intensify this effect.  Use care when operating dangerous machinery.  This prescription cannot be refilled.  This product contains acetaminophen.  Do not use  with any other product containing acetaminophen to prevent possible liver damage.  Using more of this medication than prescribed may cause serious breathing problems.    -- Indication: For per PMD    traZODone 50 mg oral tablet  -- 1.5 tab(s) by mouth once a day (at bedtime)  -- Indication: For per PMD    loratadine 10 mg oral tablet  -- 1 tab(s) by mouth once a day, As Needed  -- Indication: For per PMD    amlodipine-benazepril 10 mg-40 mg oral capsule  -- 1 cap(s) by mouth once a day  -- Indication: For per PMD    benzonatate 200 mg oral capsule  -- 1 cap(s) by mouth 3 times a day  -- Indication: For per PMD    ALPRAZolam 1 mg oral tablet  -- 1 tab(s) by mouth 3 times a day, As Needed  -- Indication: For per PMD    metoprolol succinate 50 mg oral tablet, extended release  -- 1 tab(s) by mouth once a day  -- Indication: For per PMD    Ventolin HFA 90 mcg/inh inhalation aerosol  -- 2 puff(s) inhaled 4 times a day, As Needed  -- Indication: For per PMD    Trelegy Ellipta inhalation powder  -- 1 puff(s) inhaled once a day  -- Indication: For per PMD    albuterol 2.5 mg/3 mL (0.083%) inhalation solution  -- 3 milliliter(s) inhaled every 2 hours, As Needed  -- Indication: For per PMD

## 2018-12-14 NOTE — ASU DISCHARGE PLAN (ADULT/PEDIATRIC). - ITEMS TO FOLLOWUP WITH YOUR PHYSICIAN'S
call office Dr. ANNA Farias Monday morning ( 12/17/18 ) to confirm follow up appointment for Monday 12/17/18 .

## 2018-12-18 LAB — SURGICAL PATHOLOGY FINAL REPORT - CH: SIGNIFICANT CHANGE UP

## 2018-12-19 ENCOUNTER — MEDICATION RENEWAL (OUTPATIENT)
Age: 58
End: 2018-12-19

## 2018-12-26 ENCOUNTER — MEDICATION RENEWAL (OUTPATIENT)
Age: 58
End: 2018-12-26

## 2018-12-30 ENCOUNTER — RX RENEWAL (OUTPATIENT)
Age: 58
End: 2018-12-30

## 2018-12-31 ENCOUNTER — MEDICATION RENEWAL (OUTPATIENT)
Age: 58
End: 2018-12-31

## 2018-12-31 ENCOUNTER — APPOINTMENT (OUTPATIENT)
Dept: INTERNAL MEDICINE | Facility: CLINIC | Age: 58
End: 2018-12-31

## 2019-01-06 ENCOUNTER — FORM ENCOUNTER (OUTPATIENT)
Age: 59
End: 2019-01-06

## 2019-01-07 ENCOUNTER — OUTPATIENT (OUTPATIENT)
Dept: OUTPATIENT SERVICES | Facility: HOSPITAL | Age: 59
LOS: 1 days | End: 2019-01-07
Payer: COMMERCIAL

## 2019-01-07 ENCOUNTER — APPOINTMENT (OUTPATIENT)
Dept: CT IMAGING | Facility: CLINIC | Age: 59
End: 2019-01-07
Payer: MEDICAID

## 2019-01-07 DIAGNOSIS — Z98.891 HISTORY OF UTERINE SCAR FROM PREVIOUS SURGERY: Chronic | ICD-10-CM

## 2019-01-07 DIAGNOSIS — J44.9 CHRONIC OBSTRUCTIVE PULMONARY DISEASE, UNSPECIFIED: ICD-10-CM

## 2019-01-07 DIAGNOSIS — Z98.890 OTHER SPECIFIED POSTPROCEDURAL STATES: Chronic | ICD-10-CM

## 2019-01-07 DIAGNOSIS — Z00.8 ENCOUNTER FOR OTHER GENERAL EXAMINATION: ICD-10-CM

## 2019-01-07 DIAGNOSIS — Z01.811 ENCOUNTER FOR PREPROCEDURAL RESPIRATORY EXAMINATION: ICD-10-CM

## 2019-01-07 PROCEDURE — G0297: CPT

## 2019-01-07 PROCEDURE — G0297: CPT | Mod: 26

## 2019-01-14 ENCOUNTER — APPOINTMENT (OUTPATIENT)
Dept: INTERNAL MEDICINE | Facility: CLINIC | Age: 59
End: 2019-01-14
Payer: MEDICAID

## 2019-01-14 VITALS
OXYGEN SATURATION: 95 % | DIASTOLIC BLOOD PRESSURE: 90 MMHG | SYSTOLIC BLOOD PRESSURE: 130 MMHG | WEIGHT: 190 LBS | HEART RATE: 102 BPM | BODY MASS INDEX: 34.96 KG/M2 | HEIGHT: 62 IN

## 2019-01-14 PROCEDURE — 99214 OFFICE O/P EST MOD 30 MIN: CPT | Mod: 25

## 2019-01-14 PROCEDURE — 36415 COLL VENOUS BLD VENIPUNCTURE: CPT

## 2019-01-15 LAB
BASOPHILS # BLD AUTO: 0.04 K/UL
BASOPHILS NFR BLD AUTO: 0.6 %
EOSINOPHIL # BLD AUTO: 0.29 K/UL
EOSINOPHIL NFR BLD AUTO: 4.6 %
HCT VFR BLD CALC: 41.9 %
HGB BLD-MCNC: 13.4 G/DL
IMM GRANULOCYTES NFR BLD AUTO: 0.2 %
LYMPHOCYTES # BLD AUTO: 2.06 K/UL
LYMPHOCYTES NFR BLD AUTO: 33 %
MAN DIFF?: NORMAL
MCHC RBC-ENTMCNC: 32 GM/DL
MCHC RBC-ENTMCNC: 32 PG
MCV RBC AUTO: 100 FL
MONOCYTES # BLD AUTO: 0.52 K/UL
MONOCYTES NFR BLD AUTO: 8.3 %
NEUTROPHILS # BLD AUTO: 3.32 K/UL
NEUTROPHILS NFR BLD AUTO: 53.3 %
PLATELET # BLD AUTO: 328 K/UL
RBC # BLD: 4.19 M/UL
RBC # FLD: 13.5 %
WBC # FLD AUTO: 6.24 K/UL

## 2019-01-16 LAB
ANION GAP SERPL CALC-SCNC: 16 MMOL/L
BUN SERPL-MCNC: 17 MG/DL
CALCIUM SERPL-MCNC: 9.2 MG/DL
CHLORIDE SERPL-SCNC: 104 MMOL/L
CO2 SERPL-SCNC: 24 MMOL/L
CREAT SERPL-MCNC: 0.84 MG/DL
GLUCOSE SERPL-MCNC: 104 MG/DL
POTASSIUM SERPL-SCNC: 4.2 MMOL/L
SODIUM SERPL-SCNC: 144 MMOL/L
TSH SERPL-ACNC: 2.44 UIU/ML

## 2019-01-24 ENCOUNTER — OUTPATIENT (OUTPATIENT)
Dept: OUTPATIENT SERVICES | Facility: HOSPITAL | Age: 59
LOS: 1 days | Discharge: HOME | End: 2019-01-24

## 2019-01-24 DIAGNOSIS — Z98.890 OTHER SPECIFIED POSTPROCEDURAL STATES: Chronic | ICD-10-CM

## 2019-01-24 DIAGNOSIS — Z98.891 HISTORY OF UTERINE SCAR FROM PREVIOUS SURGERY: Chronic | ICD-10-CM

## 2019-01-24 DIAGNOSIS — C50.919 MALIGNANT NEOPLASM OF UNSPECIFIED SITE OF UNSPECIFIED FEMALE BREAST: ICD-10-CM

## 2019-01-29 ENCOUNTER — APPOINTMENT (OUTPATIENT)
Dept: HEMATOLOGY ONCOLOGY | Facility: CLINIC | Age: 59
End: 2019-01-29
Payer: MEDICAID

## 2019-01-29 ENCOUNTER — RESULT REVIEW (OUTPATIENT)
Age: 59
End: 2019-01-29

## 2019-01-29 VITALS
SYSTOLIC BLOOD PRESSURE: 152 MMHG | OXYGEN SATURATION: 98 % | TEMPERATURE: 97.5 F | HEART RATE: 90 BPM | DIASTOLIC BLOOD PRESSURE: 84 MMHG | WEIGHT: 189 LBS | BODY MASS INDEX: 34.78 KG/M2 | HEIGHT: 62 IN

## 2019-01-29 LAB
BASOPHILS # BLD AUTO: 0.1 K/UL — SIGNIFICANT CHANGE UP (ref 0–0.2)
BASOPHILS NFR BLD AUTO: 1.2 % — SIGNIFICANT CHANGE UP (ref 0–2)
EOSINOPHIL # BLD AUTO: 0.2 K/UL — SIGNIFICANT CHANGE UP (ref 0–0.5)
EOSINOPHIL NFR BLD AUTO: 3.1 % — SIGNIFICANT CHANGE UP (ref 0–6)
HCT VFR BLD CALC: 42.3 % — SIGNIFICANT CHANGE UP (ref 34.5–45)
HGB BLD-MCNC: 13.6 G/DL — SIGNIFICANT CHANGE UP (ref 11.5–15.5)
LYMPHOCYTES # BLD AUTO: 2 K/UL — SIGNIFICANT CHANGE UP (ref 1–3.3)
LYMPHOCYTES # BLD AUTO: 34.3 % — SIGNIFICANT CHANGE UP (ref 13–44)
MCHC RBC-ENTMCNC: 32.1 PG — SIGNIFICANT CHANGE UP (ref 27–34)
MCHC RBC-ENTMCNC: 32.2 G/DL — SIGNIFICANT CHANGE UP (ref 32–36)
MCV RBC AUTO: 99.8 FL — SIGNIFICANT CHANGE UP (ref 80–100)
MONOCYTES # BLD AUTO: 0.5 K/UL — SIGNIFICANT CHANGE UP (ref 0–0.9)
MONOCYTES NFR BLD AUTO: 7.9 % — SIGNIFICANT CHANGE UP (ref 2–14)
NEUTROPHILS # BLD AUTO: 3.1 K/UL — SIGNIFICANT CHANGE UP (ref 1.8–7.4)
NEUTROPHILS NFR BLD AUTO: 53.5 % — SIGNIFICANT CHANGE UP (ref 43–77)
PLATELET # BLD AUTO: 293 K/UL — SIGNIFICANT CHANGE UP (ref 150–400)
RBC # BLD: 4.24 M/UL — SIGNIFICANT CHANGE UP (ref 3.8–5.2)
RBC # FLD: 12.4 % — SIGNIFICANT CHANGE UP (ref 10.3–14.5)
WBC # BLD: 5.9 K/UL — SIGNIFICANT CHANGE UP (ref 3.8–10.5)
WBC # FLD AUTO: 5.9 K/UL — SIGNIFICANT CHANGE UP (ref 3.8–10.5)

## 2019-01-29 PROCEDURE — 99213 OFFICE O/P EST LOW 20 MIN: CPT

## 2019-01-29 PROCEDURE — 99203 OFFICE O/P NEW LOW 30 MIN: CPT

## 2019-01-30 ENCOUNTER — CLINICAL ADVICE (OUTPATIENT)
Age: 59
End: 2019-01-30

## 2019-01-30 LAB
ALBUMIN SERPL ELPH-MCNC: 4.6 G/DL
ALP BLD-CCNC: 98 U/L
ALT SERPL-CCNC: 23 U/L
ANION GAP SERPL CALC-SCNC: 13 MMOL/L
AST SERPL-CCNC: 21 U/L
BILIRUB SERPL-MCNC: <0.2 MG/DL
BUN SERPL-MCNC: 18 MG/DL
CALCIUM SERPL-MCNC: 10 MG/DL
CHLORIDE SERPL-SCNC: 105 MMOL/L
CO2 SERPL-SCNC: 25 MMOL/L
CREAT SERPL-MCNC: 0.83 MG/DL
GLUCOSE SERPL-MCNC: 87 MG/DL
POTASSIUM SERPL-SCNC: 4.7 MMOL/L
PROT SERPL-MCNC: 7.1 G/DL
SODIUM SERPL-SCNC: 143 MMOL/L

## 2019-01-30 NOTE — ADDENDUM
[FreeTextEntry1] : All medical record entries made by mojgan Dickey were at Dr. Rick Easton's direction and personally dictated by me on (1/29/2019).

## 2019-01-30 NOTE — PHYSICAL EXAM
[Normal] : grossly intact [de-identified] : s/p left lumpectomy, well-healed [de-identified] : abnormal affect

## 2019-01-30 NOTE — HISTORY OF PRESENT ILLNESS
[de-identified] : Ms. Ruvalcaba is a 58 year old female with invasive lobular breast cancer. She is s/p two left lumpectomies with pathology consistent with T1CN0 invasive lobular carcinoma, histologic grade II ER+/OK+ HER2-.\par \par Pathology from 12/14/19 revealed Invasive lobular carcinoma, greater than 1 cm, lymph nodes under left axilla were normal, sentinel node normal, good margins [de-identified] : She states that she is dealing with many complications at the moment, including lower back lipoma, kidney stones, gallbladder needs to be removed\par She mentions that her   5 years ago from a stroke\par She states that she has emotional disturbance, along with depression, and possibly bipolar disorder\par She reports that she has a history of colon CA\par She states that feels very uncomfortable along her left side, with breast pain/soreness, and limited range of motion\par She mentions that she has a family history of Alzheimer's and dementia\par She complains of muscle atrophy

## 2019-01-30 NOTE — CONSULT LETTER
[Dear  ___] : Dear ~RAYSHAWN, [Consult Letter:] : I had the pleasure of evaluating your patient, [unfilled]. [Please see my note below.] : Please see my note below. [Consult Closing:] : Thank you very much for allowing me to participate in the care of this patient.  If you have any questions, please do not hesitate to contact me. [Sincerely,] : Sincerely, [DrRoby  ___] : Dr. HOUGH [DrRoby ___] : Dr. HOUGH

## 2019-01-31 ENCOUNTER — RESULT REVIEW (OUTPATIENT)
Age: 59
End: 2019-01-31

## 2019-02-01 ENCOUNTER — MEDICATION RENEWAL (OUTPATIENT)
Age: 59
End: 2019-02-01

## 2019-02-03 PROBLEM — T78.40XA ALLERGY: Status: ACTIVE | Noted: 2018-05-03

## 2019-02-04 LAB — SURGICAL PATHOLOGY STUDY: SIGNIFICANT CHANGE UP

## 2019-02-05 ENCOUNTER — APPOINTMENT (OUTPATIENT)
Dept: HEMATOLOGY ONCOLOGY | Facility: CLINIC | Age: 59
End: 2019-02-05

## 2019-02-05 ENCOUNTER — APPOINTMENT (OUTPATIENT)
Dept: RADIATION ONCOLOGY | Facility: CLINIC | Age: 59
End: 2019-02-05
Payer: MEDICAID

## 2019-02-05 ENCOUNTER — MEDICATION RENEWAL (OUTPATIENT)
Age: 59
End: 2019-02-05

## 2019-02-05 VITALS
BODY MASS INDEX: 35.12 KG/M2 | SYSTOLIC BLOOD PRESSURE: 144 MMHG | HEART RATE: 98 BPM | WEIGHT: 192 LBS | TEMPERATURE: 98.4 F | DIASTOLIC BLOOD PRESSURE: 82 MMHG | OXYGEN SATURATION: 94 % | RESPIRATION RATE: 16 BRPM

## 2019-02-05 PROCEDURE — 99205 OFFICE O/P NEW HI 60 MIN: CPT | Mod: 25

## 2019-02-05 PROCEDURE — 77263 THER RADIOLOGY TX PLNG CPLX: CPT

## 2019-02-05 NOTE — DISEASE MANAGEMENT
[Pathological] : TNM Stage: p [I] : I [FreeTextEntry4] : invasive lobular carcinoma [TTNM] : 1c [NTNM] : 0 [MTNM] : 0 [de-identified] : [id number]

## 2019-02-05 NOTE — HISTORY OF PRESENT ILLNESS
[FreeTextEntry1] : Ms. Hill presents to office for radiation medicine consultation.  This a 58 year-old female was diagnosed with invasive lobular carcinoma. She is s/p two left breast surgeries with Dr. Farias -- lumpectomy on 10/16/2018 and mass excision on 12/14/2018 with pathology consistent with [id number], stage I invasive lobular carcinoma, histologic grade II ER+/AL+ HER2-.\par \par Pathology from 12/14/19 revealed Invasive lobular carcinoma, greater than 1 cm, lymph nodes under left axilla were normal, sentinel node normal, clear margins. \par \par She reports that she is dealing with many health issues now, including lower back lipoma, kidney stones, pending gallbladder removal, and several hernias, all of which she is discussing surgery for.\par

## 2019-02-05 NOTE — PHYSICAL EXAM
[Obese] : obese [Normal] : no joint swelling, no clubbing or cyanosis of the fingernails and muscle strength and tone were normal [de-identified] : well healed left breast UOQ and left axillary surgical scars; breast pendulous [de-identified] : multiple ventral hernias; tenderness over right upper quadrant  without guarding or rebound. [FreeTextEntry1] : deferred [de-identified] : deferred [de-identified] : large (10cm?) left flank lipoma

## 2019-02-05 NOTE — LETTER CLOSING
[Consult Closing:] : Thank you for allowing me to participate in the care of this patient.  If you have any questions, please do not hesitate to contact me. [Sincerely yours,] : Sincerely yours, [FreeTextEntry3] : Roland Fuentes MD\par Physician in Chief\par Department of Radiation Medicine\par [facility]\par [facility]\par \par  of Radiation Medicine\par [facility]\par at  [facility]\par \par Radiation \par [facility]/\par [facility] [place]\par [address]\par [place], [place] [address]\par \par Tel: [phone]\par Fax: [phone]\par

## 2019-02-05 NOTE — LETTER GREETING
[Dear Doctor] : Dear Doctor, [Consult Letter:] : Your patient, [unfilled] was seen in my office today for consultation. [Please see my note below.] : Please see my note below. [FreeTextEntry2] : Charlie Farias MD

## 2019-02-06 ENCOUNTER — OTHER (OUTPATIENT)
Age: 59
End: 2019-02-06

## 2019-02-12 ENCOUNTER — APPOINTMENT (OUTPATIENT)
Dept: INTERNAL MEDICINE | Facility: CLINIC | Age: 59
End: 2019-02-12
Payer: MEDICAID

## 2019-02-12 VITALS
DIASTOLIC BLOOD PRESSURE: 80 MMHG | SYSTOLIC BLOOD PRESSURE: 120 MMHG | TEMPERATURE: 97.6 F | WEIGHT: 184 LBS | HEART RATE: 90 BPM | HEIGHT: 62 IN | BODY MASS INDEX: 33.86 KG/M2 | OXYGEN SATURATION: 97 %

## 2019-02-12 DIAGNOSIS — R45.1 RESTLESSNESS AND AGITATION: ICD-10-CM

## 2019-02-12 PROCEDURE — 99213 OFFICE O/P EST LOW 20 MIN: CPT

## 2019-02-14 ENCOUNTER — APPOINTMENT (OUTPATIENT)
Dept: PULMONOLOGY | Facility: CLINIC | Age: 59
End: 2019-02-14

## 2019-02-17 ENCOUNTER — EMERGENCY (EMERGENCY)
Facility: HOSPITAL | Age: 59
LOS: 1 days | Discharge: HOME | End: 2019-02-17
Attending: EMERGENCY MEDICINE
Payer: COMMERCIAL

## 2019-02-17 VITALS
HEART RATE: 86 BPM | HEIGHT: 64 IN | RESPIRATION RATE: 20 BRPM | OXYGEN SATURATION: 98 % | DIASTOLIC BLOOD PRESSURE: 100 MMHG | TEMPERATURE: 98 F | WEIGHT: 188.05 LBS | SYSTOLIC BLOOD PRESSURE: 173 MMHG

## 2019-02-17 DIAGNOSIS — Z98.891 HISTORY OF UTERINE SCAR FROM PREVIOUS SURGERY: Chronic | ICD-10-CM

## 2019-02-17 DIAGNOSIS — Z98.890 OTHER SPECIFIED POSTPROCEDURAL STATES: Chronic | ICD-10-CM

## 2019-02-17 PROCEDURE — 99283 EMERGENCY DEPT VISIT LOW MDM: CPT

## 2019-02-17 PROCEDURE — 73130 X-RAY EXAM OF HAND: CPT | Mod: 26,RT

## 2019-02-17 PROCEDURE — 99284 EMERGENCY DEPT VISIT MOD MDM: CPT

## 2019-02-17 PROCEDURE — 73130 X-RAY EXAM OF HAND: CPT

## 2019-02-17 PROCEDURE — 73630 X-RAY EXAM OF FOOT: CPT

## 2019-02-17 PROCEDURE — 73630 X-RAY EXAM OF FOOT: CPT | Mod: 26,LT

## 2019-02-17 RX ORDER — OXYCODONE AND ACETAMINOPHEN 5; 325 MG/1; MG/1
1 TABLET ORAL ONCE
Qty: 0 | Refills: 0 | Status: DISCONTINUED | OUTPATIENT
Start: 2019-02-17 | End: 2019-02-17

## 2019-02-17 RX ADMIN — OXYCODONE AND ACETAMINOPHEN 1 TABLET(S): 5; 325 TABLET ORAL at 10:50

## 2019-02-17 NOTE — ED STATDOCS - ATTENDING CONTRIBUTION TO CARE
Francy: I performed a face to face bedside interview with patient regarding history of present illness, review of symptoms and past medical history. I completed an independent physical exam and ordered tests/medications as needed.  I have discussed patient's plan of care with advanced care provider. The advanced care provider assisted in  executing the discussed plan. I was available for any questions or issues that may have arose during the execution of the plan of care.

## 2019-02-17 NOTE — ED STATDOCS - OBJECTIVE STATEMENT
59 y/o F pt with hx of Breast CA (mets pending) and HTN presents to ED c/o sudden onset left foot pain s/p accidentally slamming it into a kitchen pantry approximately 16 days ago. Has been able to ambulate s/p injury. Also had mechanical fall today and landed on right hand; no LOC and did not hit head. No further complaints at this time.

## 2019-02-17 NOTE — ED STATDOCS - PHYSICAL EXAMINATION
Gen: No acute distress, non toxic  HEENT: Mucous membranes moist, pink conjunctivae, EOMI  CV: RRR  Resp: CTAB, normal rate and effort  GI: Abdomen soft, NT, ND. No rebound, no guarding  Neuro: A&O x 3, moving all 4 extremities   Musc: tenderness to left dorsum, left 3rd and fifth distal metatarsal with minor swelling; neurovascularly intact. Right tenderness ecchymosis to right distal 3rd and 4th metacarpals; neurovascularly intact.

## 2019-02-17 NOTE — ED STATDOCS - NS ED ROS FT
ROS: No fever/chills.  No chest painNo SOB/cough/. No abdominal pain, N/V/D,No dysuria/frequency.  No headache. No Dizziness.    No rashes  No numbness/weakness + Left foot pain, +right hand pain

## 2019-02-17 NOTE — ED STATDOCS - PROGRESS NOTE DETAILS
PA Note: PT evaluated by intake physician. HPI/PE/ROS as noted above. Will follow up plan per intake physician. Xray of hand and foot wnl. Pt has podiatrist she will follow up with. Pt given surgical shoe, instructed on ice and ibuprofen for pain.

## 2019-02-19 ENCOUNTER — CHART COPY (OUTPATIENT)
Age: 59
End: 2019-02-19

## 2019-02-25 ENCOUNTER — APPOINTMENT (OUTPATIENT)
Dept: PULMONOLOGY | Facility: CLINIC | Age: 59
End: 2019-02-25

## 2019-02-25 RX ORDER — NYSTATIN 100000 [USP'U]/ML
100000 SUSPENSION ORAL 4 TIMES DAILY
Qty: 1 | Refills: 0 | Status: DISCONTINUED | COMMUNITY
Start: 2018-12-04 | End: 2019-02-25

## 2019-02-25 RX ORDER — PREDNISONE 20 MG/1
20 TABLET ORAL DAILY
Qty: 4 | Refills: 0 | Status: DISCONTINUED | COMMUNITY
End: 2019-02-25

## 2019-02-27 ENCOUNTER — NON-APPOINTMENT (OUTPATIENT)
Age: 59
End: 2019-02-27

## 2019-02-27 ENCOUNTER — APPOINTMENT (OUTPATIENT)
Dept: INTERNAL MEDICINE | Facility: CLINIC | Age: 59
End: 2019-02-27
Payer: MEDICAID

## 2019-02-27 VITALS
TEMPERATURE: 98.1 F | DIASTOLIC BLOOD PRESSURE: 82 MMHG | WEIGHT: 188 LBS | BODY MASS INDEX: 34.6 KG/M2 | OXYGEN SATURATION: 97 % | SYSTOLIC BLOOD PRESSURE: 134 MMHG | HEIGHT: 62 IN | HEART RATE: 82 BPM

## 2019-02-27 PROCEDURE — 99396 PREV VISIT EST AGE 40-64: CPT | Mod: 25

## 2019-02-27 PROCEDURE — 36415 COLL VENOUS BLD VENIPUNCTURE: CPT

## 2019-02-27 PROCEDURE — 82043 UR ALBUMIN QUANTITATIVE: CPT | Mod: QW

## 2019-02-27 PROCEDURE — 92552 PURE TONE AUDIOMETRY AIR: CPT

## 2019-02-27 PROCEDURE — 93000 ELECTROCARDIOGRAM COMPLETE: CPT

## 2019-02-27 RX ORDER — FUROSEMIDE 40 MG/1
40 TABLET ORAL DAILY
Qty: 30 | Refills: 2 | Status: DISCONTINUED | COMMUNITY
Start: 2018-07-03 | End: 2019-02-27

## 2019-02-27 RX ORDER — POTASSIUM CHLORIDE 1500 MG/1
20 TABLET, EXTENDED RELEASE ORAL DAILY
Refills: 0 | Status: DISCONTINUED | COMMUNITY
Start: 2018-07-03 | End: 2019-02-27

## 2019-02-28 LAB
25(OH)D3 SERPL-MCNC: 21.4 NG/ML
APPEARANCE: CLEAR
BASOPHILS # BLD AUTO: 0.06 K/UL
BASOPHILS NFR BLD AUTO: 1.2 %
BILIRUBIN URINE: NEGATIVE
BLOOD URINE: NEGATIVE
COLOR: YELLOW
EOSINOPHIL # BLD AUTO: 0.24 K/UL
EOSINOPHIL NFR BLD AUTO: 4.7 %
GLUCOSE QUALITATIVE U: NEGATIVE
HBA1C MFR BLD HPLC: 5.7 %
HCT VFR BLD CALC: 42.4 %
HCV AB SER QL: NONREACTIVE
HCV S/CO RATIO: 0.12 S/CO
HGB BLD-MCNC: 13.4 G/DL
HIV1+2 AB SPEC QL IA.RAPID: NONREACTIVE
IMM GRANULOCYTES NFR BLD AUTO: 0.2 %
KETONES URINE: NEGATIVE
LEUKOCYTE ESTERASE URINE: NEGATIVE
LYMPHOCYTES # BLD AUTO: 1.71 K/UL
LYMPHOCYTES NFR BLD AUTO: 33.2 %
MAN DIFF?: NORMAL
MCHC RBC-ENTMCNC: 31.6 GM/DL
MCHC RBC-ENTMCNC: 32.3 PG
MCV RBC AUTO: 102.2 FL
MONOCYTES # BLD AUTO: 0.45 K/UL
MONOCYTES NFR BLD AUTO: 8.7 %
NEUTROPHILS # BLD AUTO: 2.68 K/UL
NEUTROPHILS NFR BLD AUTO: 52 %
NITRITE URINE: NEGATIVE
PH URINE: 5.5
PLATELET # BLD AUTO: 356 K/UL
PROTEIN URINE: NEGATIVE
RBC # BLD: 4.15 M/UL
RBC # FLD: 13.2 %
SPECIFIC GRAVITY URINE: 1.02
UROBILINOGEN URINE: NORMAL
WBC # FLD AUTO: 5.15 K/UL

## 2019-03-01 ENCOUNTER — CHART COPY (OUTPATIENT)
Age: 59
End: 2019-03-01

## 2019-03-01 ENCOUNTER — OUTPATIENT (OUTPATIENT)
Dept: OUTPATIENT SERVICES | Facility: HOSPITAL | Age: 59
LOS: 1 days | Discharge: HOME | End: 2019-03-01
Payer: MEDICAID

## 2019-03-01 DIAGNOSIS — Z98.891 HISTORY OF UTERINE SCAR FROM PREVIOUS SURGERY: Chronic | ICD-10-CM

## 2019-03-01 DIAGNOSIS — Z98.890 OTHER SPECIFIED POSTPROCEDURAL STATES: Chronic | ICD-10-CM

## 2019-03-01 LAB
ALBUMIN SERPL ELPH-MCNC: 4.2 G/DL
ALP BLD-CCNC: 130 U/L
ALT SERPL-CCNC: 26 U/L
ANION GAP SERPL CALC-SCNC: 20 MMOL/L
AST SERPL-CCNC: 20 U/L
BILIRUB SERPL-MCNC: <0.2 MG/DL
BUN SERPL-MCNC: 20 MG/DL
CALCIUM SERPL-MCNC: 9.5 MG/DL
CHLORIDE SERPL-SCNC: 105 MMOL/L
CHOLEST SERPL-MCNC: 260 MG/DL
CHOLEST/HDLC SERPL: 3.4 RATIO
CK SERPL-CCNC: 56 U/L
CO2 SERPL-SCNC: 20 MMOL/L
CREAT SERPL-MCNC: 1.51 MG/DL
GLUCOSE SERPL-MCNC: 98 MG/DL
HDLC SERPL-MCNC: 77 MG/DL
LDLC SERPL CALC-MCNC: 165 MG/DL
POTASSIUM SERPL-SCNC: 5 MMOL/L
PROT SERPL-MCNC: 7 G/DL
SODIUM SERPL-SCNC: 145 MMOL/L
TRIGL SERPL-MCNC: 89 MG/DL
TSH SERPL-ACNC: 2.38 UIU/ML
URATE SERPL-MCNC: 4.3 MG/DL

## 2019-03-15 PROCEDURE — 77290 THER RAD SIMULAJ FIELD CPLX: CPT | Mod: 26

## 2019-03-15 PROCEDURE — 77334 RADIATION TREATMENT AID(S): CPT | Mod: 26

## 2019-03-19 PROCEDURE — 77334 RADIATION TREATMENT AID(S): CPT | Mod: 26

## 2019-03-19 PROCEDURE — 77295 3-D RADIOTHERAPY PLAN: CPT | Mod: 26

## 2019-03-19 PROCEDURE — 77300 RADIATION THERAPY DOSE PLAN: CPT | Mod: 26

## 2019-03-22 PROCEDURE — 77280 THER RAD SIMULAJ FIELD SMPL: CPT | Mod: 26

## 2019-03-25 ENCOUNTER — OUTPATIENT (OUTPATIENT)
Dept: OUTPATIENT SERVICES | Facility: HOSPITAL | Age: 59
LOS: 1 days | Discharge: HOME | End: 2019-03-25

## 2019-03-25 DIAGNOSIS — Z98.891 HISTORY OF UTERINE SCAR FROM PREVIOUS SURGERY: Chronic | ICD-10-CM

## 2019-03-25 DIAGNOSIS — Z98.890 OTHER SPECIFIED POSTPROCEDURAL STATES: Chronic | ICD-10-CM

## 2019-03-25 DIAGNOSIS — C50.919 MALIGNANT NEOPLASM OF UNSPECIFIED SITE OF UNSPECIFIED FEMALE BREAST: ICD-10-CM

## 2019-03-25 PROCEDURE — G6017: CPT

## 2019-03-25 NOTE — DISCUSSION/SUMMARY
[Cancer Type / Location / Histology Subtype: ________] : Cancer Type / Location / Histology Subtype: [unfilled] [Diagnosis Date (year): ____] : Diagnosis Date (year): [unfilled] [I] : I [Surgery] : Surgery: Yes [Surgery Date(s) (year): ____] : Surgery Date(s) (year): [unfilled] [Surgical Procedure / Location / Findings: _________] : Surgical Procedure / Location / Findings: [unfilled] [Radiation] : Radiation: Yes [Body Area Treated: _________] : Body Area Treated: [unfilled] [End Date (year): ____] : End Date (year): [unfilled] [Follow up with Radiation MD in _____] : Follow up with Radiation MD in [unfilled] [FreeTextEntry8] : Perri Hansen

## 2019-03-26 ENCOUNTER — APPOINTMENT (OUTPATIENT)
Dept: INTERNAL MEDICINE | Facility: CLINIC | Age: 59
End: 2019-03-26
Payer: MEDICAID

## 2019-03-26 VITALS
SYSTOLIC BLOOD PRESSURE: 140 MMHG | OXYGEN SATURATION: 96 % | RESPIRATION RATE: 14 BRPM | DIASTOLIC BLOOD PRESSURE: 86 MMHG | BODY MASS INDEX: 34.41 KG/M2 | HEIGHT: 62 IN | HEART RATE: 87 BPM | WEIGHT: 187 LBS | TEMPERATURE: 98 F

## 2019-03-26 DIAGNOSIS — Z87.898 PERSONAL HISTORY OF OTHER SPECIFIED CONDITIONS: ICD-10-CM

## 2019-03-26 DIAGNOSIS — E55.9 VITAMIN D DEFICIENCY, UNSPECIFIED: ICD-10-CM

## 2019-03-26 PROCEDURE — G6017: CPT

## 2019-03-26 PROCEDURE — 99214 OFFICE O/P EST MOD 30 MIN: CPT

## 2019-03-27 ENCOUNTER — MEDICATION RENEWAL (OUTPATIENT)
Age: 59
End: 2019-03-27

## 2019-03-27 ENCOUNTER — APPOINTMENT (OUTPATIENT)
Dept: HEMATOLOGY ONCOLOGY | Facility: CLINIC | Age: 59
End: 2019-03-27
Payer: MEDICAID

## 2019-03-27 VITALS
HEART RATE: 79 BPM | OXYGEN SATURATION: 96 % | HEIGHT: 62 IN | SYSTOLIC BLOOD PRESSURE: 152 MMHG | WEIGHT: 187 LBS | DIASTOLIC BLOOD PRESSURE: 86 MMHG | TEMPERATURE: 98 F | BODY MASS INDEX: 34.41 KG/M2

## 2019-03-27 PROCEDURE — G6017: CPT

## 2019-03-27 PROCEDURE — 99214 OFFICE O/P EST MOD 30 MIN: CPT

## 2019-03-28 PROCEDURE — G6017: CPT

## 2019-03-29 ENCOUNTER — MEDICATION RENEWAL (OUTPATIENT)
Age: 59
End: 2019-03-29

## 2019-03-29 PROCEDURE — 77427 RADIATION TX MANAGEMENT X5: CPT

## 2019-03-29 PROCEDURE — G6017: CPT

## 2019-03-30 NOTE — ADDENDUM
[FreeTextEntry1] : All medical record entries made by mojgan Dickey were at Dr. Rick Easton's direction and personally dictated by me on (3/27/2019).

## 2019-03-30 NOTE — PHYSICAL EXAM
[Normal] : grossly intact [de-identified] : s/p left lumpectomy, well-healed [de-identified] : abnormal affect

## 2019-03-30 NOTE — HISTORY OF PRESENT ILLNESS
[de-identified] : Ms. Ruvalcaba is a 58 year old female with invasive lobular breast cancer. She is s/p two left lumpectomies with pathology consistent with T1CN0 invasive lobular carcinoma, histologic grade II ER+/AR+ HER2-. Kemi has initiated radiation therapy with Dr. Fuentes, and upon completion is planned to initiate a 5 year course of Anastrazole 1 mg. As per her Oncotype score, she has a low risk for recurrence, and will therefore be treated without chemotherapy.\par \par Pathology from 12/14/19 revealed Invasive lobular carcinoma, greater than 1 cm, lymph nodes under left axilla were normal, sentinel node normal, good margins\par \par Oncotype DX Recurrence Score on 2/28/19 revealed: Recurrence score of 17, 5% risk at 9 years, no chemotherapy benefit (<1%) [de-identified] : She states that she is tolerating radiation well\par She brings in her Oncotype recurrence score report, which shows she is of low risk for recurrence (RS = 17)

## 2019-03-30 NOTE — ASSESSMENT
[FreeTextEntry1] : T1CN0 invasive lobular carcinoma, histologic grade II ER+/HI+ HER2-, s/p lumpectomy with negative margins. Oncotype recurrence score of 17 (low risk), no chemotherapy required. Undergoing RT with Dr. Fuentes, then will initiate Anastrazole. \par \par Plan:\par -Continue radiotherapy with Dr. Fuentes. \par -After completing RT, initiate Anastrazole hormonal therapy. Risks, benefits, and side effects discussed.\par -Follow up biannually

## 2019-04-01 ENCOUNTER — APPOINTMENT (OUTPATIENT)
Dept: PULMONOLOGY | Facility: CLINIC | Age: 59
End: 2019-04-01

## 2019-04-01 ENCOUNTER — MEDICATION RENEWAL (OUTPATIENT)
Age: 59
End: 2019-04-01

## 2019-04-02 PROCEDURE — G6017: CPT

## 2019-04-03 VITALS
BODY MASS INDEX: 34.23 KG/M2 | TEMPERATURE: 98 F | DIASTOLIC BLOOD PRESSURE: 85 MMHG | WEIGHT: 186 LBS | HEIGHT: 62 IN | HEART RATE: 99 BPM | SYSTOLIC BLOOD PRESSURE: 158 MMHG | RESPIRATION RATE: 14 BRPM | OXYGEN SATURATION: 93 %

## 2019-04-03 PROCEDURE — G6017: CPT

## 2019-04-03 NOTE — PHYSICAL EXAM
[Obese] : obese [Breast Palpation Mass] : no palpable masses [Breast Abnormal Lactation (Galactorrhea)] : no nipple discharge [No UE Edema] : there is no upper extremity edema [Normal] : no focal deficits [Oriented To Time, Place, And Person] : oriented to person, place, and time [de-identified] : s/p left lumpectomy, scars healed

## 2019-04-03 NOTE — VITALS
[Maximal Pain Intensity: 5/10] : 5/10 [Least Pain Intensity: 2/10] : 2/10 [Pain Description/Quality: ___] : Pain description/quality: [unfilled] [Pain Duration: ___] : Pain duration: [unfilled] [Pain Location: ___] : Pain Location: [unfilled] [OTC] : OTC [NSAID/Non-Opioid] : NSAID/Non-Opioid [80: Normal activity with effort; some signs or symptoms of disease.] : 80: Normal activity with effort; some signs or symptoms of disease.  [ECOG Performance Status: 2 - Ambulatory and capable of all self care but unable to carry out any work activities] : Performance Status: 2 - Ambulatory and capable of all self care but unable to carry out any work activities. Up and about more than 50% of waking hours [Pain Interferes with ADLs] : Pain does not interfere with activities of daily living

## 2019-04-03 NOTE — DISEASE MANAGEMENT
[Pathological] : TNM Stage: p [I] : I [TTNM] : 1c [NTNM] : 0 [MTNM] : 0 [de-identified] : 1,855 cGy [de-identified] : 4,240 cGy [de-identified] : left breast

## 2019-04-04 PROCEDURE — G6017: CPT

## 2019-04-05 PROCEDURE — G6017: CPT

## 2019-04-08 VITALS
HEIGHT: 62 IN | SYSTOLIC BLOOD PRESSURE: 171 MMHG | TEMPERATURE: 97.6 F | RESPIRATION RATE: 14 BRPM | HEART RATE: 85 BPM | WEIGHT: 186 LBS | OXYGEN SATURATION: 94 % | BODY MASS INDEX: 34.23 KG/M2 | DIASTOLIC BLOOD PRESSURE: 97 MMHG

## 2019-04-08 PROCEDURE — G6017: CPT

## 2019-04-08 PROCEDURE — 77427 RADIATION TX MANAGEMENT X5: CPT

## 2019-04-08 NOTE — VITALS
[Maximal Pain Intensity: 5/10] : 5/10 [Least Pain Intensity: 2/10] : 2/10 [Pain Description/Quality: ___] : Pain description/quality: [unfilled] [Pain Duration: ___] : Pain duration: [unfilled] [Pain Location: ___] : Pain Location: [unfilled] [Pain Interferes with ADLs] : Pain does not interfere with activities of daily living [OTC] : OTC [NSAID/Non-Opioid] : NSAID/Non-Opioid [80: Normal activity with effort; some signs or symptoms of disease.] : 80: Normal activity with effort; some signs or symptoms of disease.  [ECOG Performance Status: 2 - Ambulatory and capable of all self care but unable to carry out any work activities] : Performance Status: 2 - Ambulatory and capable of all self care but unable to carry out any work activities. Up and about more than 50% of waking hours

## 2019-04-08 NOTE — PHYSICAL EXAM
[Obese] : obese [Breast Palpation Mass] : no palpable masses [Breast Abnormal Lactation (Galactorrhea)] : no nipple discharge [No UE Edema] : there is no upper extremity edema [Normal] : no focal deficits [Oriented To Time, Place, And Person] : oriented to person, place, and time [de-identified] : s/p left lumpectomy, scars healed [de-identified] : very mild erythema left breast

## 2019-04-08 NOTE — DISEASE MANAGEMENT
[Pathological] : TNM Stage: p [TTNM] : 1c [NTNM] : 0 [MTNM] : 0 [I] : I [de-identified] : 1,855 cGy [de-identified] : 4,240 cGy [de-identified] : left breast

## 2019-04-09 PROCEDURE — G6017: CPT

## 2019-04-12 ENCOUNTER — APPOINTMENT (OUTPATIENT)
Dept: INTERNAL MEDICINE | Facility: CLINIC | Age: 59
End: 2019-04-12
Payer: MEDICAID

## 2019-04-12 PROCEDURE — G6017: CPT

## 2019-04-15 VITALS
SYSTOLIC BLOOD PRESSURE: 175 MMHG | HEART RATE: 98 BPM | RESPIRATION RATE: 14 BRPM | TEMPERATURE: 98 F | HEIGHT: 62 IN | WEIGHT: 186 LBS | DIASTOLIC BLOOD PRESSURE: 84 MMHG | BODY MASS INDEX: 34.23 KG/M2

## 2019-04-15 PROCEDURE — G6017: CPT

## 2019-04-15 NOTE — PHYSICAL EXAM
[Obese] : obese [Breast Palpation Mass] : no palpable masses [Breast Abnormal Lactation (Galactorrhea)] : no nipple discharge [No UE Edema] : there is no upper extremity edema [Normal] : no focal deficits [de-identified] : brisk erythema left breast with moist desquamation starting intramammary fold [de-identified] : s/p left lumpectomy, scars healed [Oriented To Time, Place, And Person] : oriented to person, place, and time

## 2019-04-15 NOTE — DISEASE MANAGEMENT
[Pathological] : TNM Stage: p [FreeTextEntry4] : invasive lobular carcinoma, ER+OK+HER2- [TTNM] : 1c [NTNM] : 0 [MTNM] : 0 [I] : I [de-identified] : 4,240 cGy [de-identified] : 3,445 cGy [de-identified] : left breast

## 2019-04-16 PROCEDURE — G6017: CPT

## 2019-04-17 ENCOUNTER — APPOINTMENT (OUTPATIENT)
Dept: INTERNAL MEDICINE | Facility: CLINIC | Age: 59
End: 2019-04-17
Payer: MEDICAID

## 2019-04-17 ENCOUNTER — APPOINTMENT (OUTPATIENT)
Dept: PULMONOLOGY | Facility: CLINIC | Age: 59
End: 2019-04-17
Payer: MEDICAID

## 2019-04-17 VITALS
WEIGHT: 187 LBS | OXYGEN SATURATION: 97 % | SYSTOLIC BLOOD PRESSURE: 140 MMHG | DIASTOLIC BLOOD PRESSURE: 78 MMHG | BODY MASS INDEX: 34.41 KG/M2 | HEART RATE: 84 BPM | HEIGHT: 62 IN

## 2019-04-17 VITALS
SYSTOLIC BLOOD PRESSURE: 140 MMHG | OXYGEN SATURATION: 96 % | HEART RATE: 96 BPM | DIASTOLIC BLOOD PRESSURE: 96 MMHG | WEIGHT: 187 LBS | HEIGHT: 62 IN | TEMPERATURE: 97.9 F | BODY MASS INDEX: 34.41 KG/M2

## 2019-04-17 DIAGNOSIS — G47.00 INSOMNIA, UNSPECIFIED: ICD-10-CM

## 2019-04-17 PROCEDURE — 99214 OFFICE O/P EST MOD 30 MIN: CPT

## 2019-04-17 PROCEDURE — 77427 RADIATION TX MANAGEMENT X5: CPT

## 2019-04-17 PROCEDURE — G6017: CPT

## 2019-04-17 RX ORDER — ZOLPIDEM TARTRATE 10 MG/1
10 TABLET ORAL
Qty: 30 | Refills: 2 | Status: DISCONTINUED | OUTPATIENT
Start: 2019-04-01 | End: 2019-04-17

## 2019-04-17 NOTE — PROCEDURE
[FreeTextEntry1] : CT chest 21/19: stable minute nodules from 11/17\par last spirometry : 12/18 severe air flow obstruction , improved from 10/18\par Sleep study 9/18: no sig JAGDISH, nocturnal hypoxemia

## 2019-04-17 NOTE — PHYSICAL EXAM
[Normal Appearance] : normal appearance [General Appearance - Well Developed] : well developed [General Appearance - Well Nourished] : well nourished [Normal Conjunctiva] : the conjunctiva exhibited no abnormalities [Normal Oropharynx] : normal oropharynx [II] : II [Neck Appearance] : the appearance of the neck was normal [Heart Rate And Rhythm] : heart rate and rhythm were normal [Heart Sounds] : normal S1 and S2 [Murmurs] : no murmurs present [Exaggerated Use Of Accessory Muscles For Inspiration] : no accessory muscle use [Respiration, Rhythm And Depth] : normal respiratory rhythm and effort [Auscultation Breath Sounds / Voice Sounds] : lungs were clear to auscultation bilaterally [Abnormal Walk] : normal gait [Nail Clubbing] : no clubbing of the fingernails [Abdomen Soft] : soft [] : no rash [Cyanosis, Localized] : no localized cyanosis [No Focal Deficits] : no focal deficits [Oriented To Time, Place, And Person] : oriented to person, place, and time [Impaired Insight] : insight and judgment were intact [Affect] : the affect was normal [Memory Recent] : recent memory was not impaired [FreeTextEntry1] : no chest wall abn

## 2019-04-17 NOTE — DISCUSSION/SUMMARY
[FreeTextEntry1] : COPD Gold class III, asthma component, nicotine addiction, at baseline\par  no fever, chill, chest pain\par exam without bronchospasm, normal sp02\par minute nodules on low dose rad CT scan stable 1/19  ( from 2017)\par smoking cessation discussed \par continue  trelegy daily ( ICS,LABA,LAMA) and samples given\par has home nebulizer, prednisone taper and abx prn \par sleep study negative for JAGDISH, had nocturnal  hypoxemia, will repeat overnight oximetry given clinical improvement in COPD status\par 3 months or sooner if needed\par \par No pulmonary contraindications to proposed procedure\par at increased risk of post operative pulmonary complications\par risk/benefit favors\par duo neb prior to induction and q 4 hrs post op\par monitored bed, continuos sp02\par empiric cpap 8 if any sig nocturnal desaturation  ( to be adjusted pending sleep study)

## 2019-04-17 NOTE — REVIEW OF SYSTEMS
[As Noted in HPI] : as noted in HPI [Negative] : Dermatologic [Back Pain] : ~T back pain [FreeTextEntry3] : slight weight gain

## 2019-04-17 NOTE — HISTORY OF PRESENT ILLNESS
[FreeTextEntry1] : feels sig better\par better than baseline\par no fever, chills, chest pain\par smoking again\par using trelegy\par Cardiology Dr Hamlin\par \par

## 2019-04-17 NOTE — CONSULT LETTER
[Dear  ___] : Dear  [unfilled], [Consult Letter:] : I had the pleasure of evaluating your patient, [unfilled]. [Please see my note below.] : Please see my note below. [Sincerely,] : Sincerely, [Consult Closing:] : Thank you very much for allowing me to participate in the care of this patient.  If you have any questions, please do not hesitate to contact me. [DrRoby ___] : Dr. HOUGH [facility] : [facility] [Director, Respiratory Care] : Director, Respiratory Care [[facility]] : [facility] [FreeTextEntry3] : Melvin Hopson DO [facility]\par Pulmonary Critical Care\par Director Pulmonary Division\par Medical Director Respiratory Therapy\par [facility]\par \par

## 2019-04-18 PROCEDURE — G6017: CPT

## 2019-04-19 ENCOUNTER — MEDICATION RENEWAL (OUTPATIENT)
Age: 59
End: 2019-04-19

## 2019-04-22 ENCOUNTER — MEDICATION RENEWAL (OUTPATIENT)
Age: 59
End: 2019-04-22

## 2019-04-24 ENCOUNTER — MEDICATION RENEWAL (OUTPATIENT)
Age: 59
End: 2019-04-24

## 2019-04-24 ENCOUNTER — APPOINTMENT (OUTPATIENT)
Dept: INTERNAL MEDICINE | Facility: CLINIC | Age: 59
End: 2019-04-24

## 2019-05-03 ENCOUNTER — MEDICATION RENEWAL (OUTPATIENT)
Age: 59
End: 2019-05-03

## 2019-05-04 LAB — POTASSIUM SERPL-SCNC: 4.6 MMOL/L

## 2019-05-17 ENCOUNTER — MEDICATION RENEWAL (OUTPATIENT)
Age: 59
End: 2019-05-17

## 2019-05-22 ENCOUNTER — APPOINTMENT (OUTPATIENT)
Dept: RADIATION ONCOLOGY | Facility: CLINIC | Age: 59
End: 2019-05-22
Payer: MEDICAID

## 2019-05-22 VITALS
RESPIRATION RATE: 16 BRPM | DIASTOLIC BLOOD PRESSURE: 100 MMHG | HEART RATE: 73 BPM | WEIGHT: 188 LBS | OXYGEN SATURATION: 97 % | TEMPERATURE: 97.9 F | BODY MASS INDEX: 34.39 KG/M2 | SYSTOLIC BLOOD PRESSURE: 144 MMHG

## 2019-05-22 PROCEDURE — 99024 POSTOP FOLLOW-UP VISIT: CPT

## 2019-05-22 RX ORDER — ANASTROZOLE TABLETS 1 MG/1
1 TABLET ORAL
Qty: 90 | Refills: 3 | Status: DISCONTINUED | COMMUNITY
Start: 2019-03-27 | End: 2019-05-22

## 2019-05-22 NOTE — LETTER CLOSING
[Sincerely yours,] : Sincerely yours, [FreeTextEntry3] : Roland Fuentes MD\par Physician in Chief\par Department of Radiation Medicine\par [facility] [place]\par [facility] [place]\par \par  of Radiation Medicine\par [facility]\par at  [facility]\par \par Radiation \par [facility]/\par [facility] [place]\par [address]\par [place], [place] [address]\par \par Tel: [phone]\par Fax: [phone]\par

## 2019-05-22 NOTE — DISEASE MANAGEMENT
[Pathological] : TNM Stage: p [I] : I [FreeTextEntry4] : invasive lobular [TTNM] : 1c [NTNM] : 0 [MTNM] : 0 [de-identified] : 4,240 cGy [de-identified] : left breast

## 2019-05-22 NOTE — REASON FOR VISIT
[Post-Treatment Evaluation] : post-treatment evaluation for [Breast Cancer] : breast cancer [Other: _____] : [unfilled]

## 2019-05-22 NOTE — PHYSICAL EXAM
[Obese] : obese [] : no respiratory distress [Respiration, Rhythm And Depth] : normal respiratory rhythm and effort [Exaggerated Use Of Accessory Muscles For Inspiration] : no accessory muscle use [Breast Appearance] : normal in appearance [Symmetric] : breasts are symmetric [Breast Palpation Mass] : no palpable masses [Breast Abnormal Lactation (Galactorrhea)] : no nipple discharge [No UE Edema] : there is no upper extremity edema [Normal] : oriented to person, place and time, the affect was normal, the mood was normal and not anxious [de-identified] : mild inspiratory wheeze [de-identified] : skin left breast with dry dequamation [de-identified] : hyperpigmentation of skin of left breast.

## 2019-05-22 NOTE — ASSESSMENT
[No evidence of disease] : No evidence of disease [FreeTextEntry1] : modest resolving treatment related sequelae.

## 2019-05-22 NOTE — LETTER GREETING
[Dear Doctor] : Dear Doctor, [Follow-Up] : Your patient, [unfilled] was seen in my office today for follow-up [Please see my note below.] : Please see my note below. [FreeTextEntry2] : Charlie Farias MD

## 2019-05-22 NOTE — REVIEW OF SYSTEMS
[Wheezing] : wheezing [Negative] : Gastrointestinal [de-identified] : d [de-identified] : began anastrozole, then discontinued d/t side effects

## 2019-06-07 ENCOUNTER — MEDICATION RENEWAL (OUTPATIENT)
Age: 59
End: 2019-06-07

## 2019-06-23 ENCOUNTER — FORM ENCOUNTER (OUTPATIENT)
Age: 59
End: 2019-06-23

## 2019-06-24 ENCOUNTER — APPOINTMENT (OUTPATIENT)
Dept: ULTRASOUND IMAGING | Facility: CLINIC | Age: 59
End: 2019-06-24
Payer: MEDICAID

## 2019-06-24 ENCOUNTER — APPOINTMENT (OUTPATIENT)
Dept: MAMMOGRAPHY | Facility: CLINIC | Age: 59
End: 2019-06-24
Payer: MEDICAID

## 2019-06-24 ENCOUNTER — OUTPATIENT (OUTPATIENT)
Dept: OUTPATIENT SERVICES | Facility: HOSPITAL | Age: 59
LOS: 1 days | End: 2019-06-24
Payer: COMMERCIAL

## 2019-06-24 DIAGNOSIS — Z98.891 HISTORY OF UTERINE SCAR FROM PREVIOUS SURGERY: Chronic | ICD-10-CM

## 2019-06-24 DIAGNOSIS — R92.8 OTHER ABNORMAL AND INCONCLUSIVE FINDINGS ON DIAGNOSTIC IMAGING OF BREAST: ICD-10-CM

## 2019-06-24 DIAGNOSIS — Z98.890 OTHER SPECIFIED POSTPROCEDURAL STATES: Chronic | ICD-10-CM

## 2019-06-24 PROCEDURE — G0279: CPT | Mod: 26

## 2019-06-24 PROCEDURE — 77065 DX MAMMO INCL CAD UNI: CPT | Mod: 26,LT

## 2019-06-24 PROCEDURE — 77065 DX MAMMO INCL CAD UNI: CPT

## 2019-06-24 PROCEDURE — G0279: CPT

## 2019-06-24 PROCEDURE — 76641 ULTRASOUND BREAST COMPLETE: CPT

## 2019-06-24 PROCEDURE — 76641 ULTRASOUND BREAST COMPLETE: CPT | Mod: 26,50

## 2019-06-26 ENCOUNTER — MEDICATION RENEWAL (OUTPATIENT)
Age: 59
End: 2019-06-26

## 2019-06-29 ENCOUNTER — RX RENEWAL (OUTPATIENT)
Age: 59
End: 2019-06-29

## 2019-07-03 ENCOUNTER — MEDICATION RENEWAL (OUTPATIENT)
Age: 59
End: 2019-07-03

## 2019-07-08 ENCOUNTER — FORM ENCOUNTER (OUTPATIENT)
Age: 59
End: 2019-07-08

## 2019-07-09 ENCOUNTER — APPOINTMENT (OUTPATIENT)
Dept: CT IMAGING | Facility: CLINIC | Age: 59
End: 2019-07-09
Payer: MEDICAID

## 2019-07-09 ENCOUNTER — OUTPATIENT (OUTPATIENT)
Dept: OUTPATIENT SERVICES | Facility: HOSPITAL | Age: 59
LOS: 1 days | End: 2019-07-09
Payer: COMMERCIAL

## 2019-07-09 DIAGNOSIS — Z98.891 HISTORY OF UTERINE SCAR FROM PREVIOUS SURGERY: Chronic | ICD-10-CM

## 2019-07-09 DIAGNOSIS — Z98.890 OTHER SPECIFIED POSTPROCEDURAL STATES: Chronic | ICD-10-CM

## 2019-07-09 DIAGNOSIS — Z00.8 ENCOUNTER FOR OTHER GENERAL EXAMINATION: ICD-10-CM

## 2019-07-09 PROCEDURE — 74160 CT ABDOMEN W/CONTRAST: CPT | Mod: 26

## 2019-07-09 PROCEDURE — 82565 ASSAY OF CREATININE: CPT

## 2019-07-09 PROCEDURE — 74160 CT ABDOMEN W/CONTRAST: CPT

## 2019-07-12 ENCOUNTER — APPOINTMENT (OUTPATIENT)
Dept: BREAST CENTER | Facility: CLINIC | Age: 59
End: 2019-07-12

## 2019-07-21 ENCOUNTER — FORM ENCOUNTER (OUTPATIENT)
Age: 59
End: 2019-07-21

## 2019-07-22 ENCOUNTER — APPOINTMENT (OUTPATIENT)
Age: 59
End: 2019-07-22

## 2019-07-22 ENCOUNTER — APPOINTMENT (OUTPATIENT)
Dept: MAMMOGRAPHY | Facility: CLINIC | Age: 59
End: 2019-07-22
Payer: MEDICAID

## 2019-07-22 ENCOUNTER — OUTPATIENT (OUTPATIENT)
Dept: OUTPATIENT SERVICES | Facility: HOSPITAL | Age: 59
LOS: 1 days | End: 2019-07-22
Payer: COMMERCIAL

## 2019-07-22 DIAGNOSIS — Z00.8 ENCOUNTER FOR OTHER GENERAL EXAMINATION: ICD-10-CM

## 2019-07-22 DIAGNOSIS — R92.8 OTHER ABNORMAL AND INCONCLUSIVE FINDINGS ON DIAGNOSTIC IMAGING OF BREAST: ICD-10-CM

## 2019-07-22 DIAGNOSIS — Z98.891 HISTORY OF UTERINE SCAR FROM PREVIOUS SURGERY: Chronic | ICD-10-CM

## 2019-07-22 DIAGNOSIS — Z98.890 OTHER SPECIFIED POSTPROCEDURAL STATES: Chronic | ICD-10-CM

## 2019-07-22 PROCEDURE — 72192 CT PELVIS W/O DYE: CPT

## 2019-07-22 PROCEDURE — 77063 BREAST TOMOSYNTHESIS BI: CPT

## 2019-07-22 PROCEDURE — 77067 SCR MAMMO BI INCL CAD: CPT | Mod: 26,52

## 2019-07-22 PROCEDURE — 77067 SCR MAMMO BI INCL CAD: CPT

## 2019-07-22 PROCEDURE — 77063 BREAST TOMOSYNTHESIS BI: CPT | Mod: 26,52

## 2019-07-26 ENCOUNTER — OTHER (OUTPATIENT)
Age: 59
End: 2019-07-26

## 2019-07-26 ENCOUNTER — APPOINTMENT (OUTPATIENT)
Dept: INTERNAL MEDICINE | Facility: CLINIC | Age: 59
End: 2019-07-26
Payer: MEDICAID

## 2019-07-26 VITALS
HEART RATE: 97 BPM | SYSTOLIC BLOOD PRESSURE: 130 MMHG | HEIGHT: 62 IN | DIASTOLIC BLOOD PRESSURE: 80 MMHG | OXYGEN SATURATION: 96 % | TEMPERATURE: 98.1 F | WEIGHT: 186 LBS | BODY MASS INDEX: 34.23 KG/M2

## 2019-07-26 PROCEDURE — 99214 OFFICE O/P EST MOD 30 MIN: CPT | Mod: 25

## 2019-07-26 PROCEDURE — 36415 COLL VENOUS BLD VENIPUNCTURE: CPT

## 2019-07-26 RX ORDER — IBUPROFEN AND FAMOTIDINE 800; 26.6 MG/1; MG/1
800-26.6 TABLET, COATED ORAL
Qty: 90 | Refills: 2 | Status: DISCONTINUED | COMMUNITY
Start: 2019-03-26 | End: 2019-07-26

## 2019-07-26 RX ORDER — ESZOPICLONE 3 MG/1
3 TABLET, FILM COATED ORAL
Qty: 30 | Refills: 0 | Status: DISCONTINUED | COMMUNITY
Start: 2019-03-26 | End: 2019-07-26

## 2019-07-26 RX ORDER — BENZONATATE 200 MG/1
200 CAPSULE ORAL 3 TIMES DAILY
Qty: 28 | Refills: 0 | Status: DISCONTINUED | COMMUNITY
Start: 2018-03-19 | End: 2019-07-26

## 2019-07-27 LAB
ANION GAP SERPL CALC-SCNC: 12 MMOL/L
BASOPHILS # BLD AUTO: 0.05 K/UL
BASOPHILS NFR BLD AUTO: 0.7 %
BUN SERPL-MCNC: 30 MG/DL
CALCIUM SERPL-MCNC: 9.8 MG/DL
CHLORIDE SERPL-SCNC: 102 MMOL/L
CO2 SERPL-SCNC: 30 MMOL/L
CREAT SERPL-MCNC: 1.42 MG/DL
EOSINOPHIL # BLD AUTO: 0.14 K/UL
EOSINOPHIL NFR BLD AUTO: 1.9 %
GLUCOSE SERPL-MCNC: 106 MG/DL
HCT VFR BLD CALC: 40.8 %
HGB BLD-MCNC: 13 G/DL
IMM GRANULOCYTES NFR BLD AUTO: 0.3 %
LYMPHOCYTES # BLD AUTO: 1.33 K/UL
LYMPHOCYTES NFR BLD AUTO: 18.3 %
MAN DIFF?: NORMAL
MCHC RBC-ENTMCNC: 31.9 GM/DL
MCHC RBC-ENTMCNC: 32.7 PG
MCV RBC AUTO: 102.5 FL
MONOCYTES # BLD AUTO: 0.66 K/UL
MONOCYTES NFR BLD AUTO: 9.1 %
NEUTROPHILS # BLD AUTO: 5.05 K/UL
NEUTROPHILS NFR BLD AUTO: 69.7 %
PLATELET # BLD AUTO: 331 K/UL
POTASSIUM SERPL-SCNC: 4.4 MMOL/L
RBC # BLD: 3.98 M/UL
RBC # FLD: 12.7 %
SODIUM SERPL-SCNC: 144 MMOL/L
WBC # FLD AUTO: 7.25 K/UL

## 2019-08-02 ENCOUNTER — MEDICATION RENEWAL (OUTPATIENT)
Age: 59
End: 2019-08-02

## 2019-08-06 ENCOUNTER — APPOINTMENT (OUTPATIENT)
Dept: RADIATION ONCOLOGY | Facility: CLINIC | Age: 59
End: 2019-08-06

## 2019-08-09 ENCOUNTER — FORM ENCOUNTER (OUTPATIENT)
Age: 59
End: 2019-08-09

## 2019-08-10 ENCOUNTER — APPOINTMENT (OUTPATIENT)
Dept: ULTRASOUND IMAGING | Facility: CLINIC | Age: 59
End: 2019-08-10
Payer: MEDICAID

## 2019-08-10 ENCOUNTER — OUTPATIENT (OUTPATIENT)
Dept: OUTPATIENT SERVICES | Facility: HOSPITAL | Age: 59
LOS: 1 days | End: 2019-08-10
Payer: COMMERCIAL

## 2019-08-10 DIAGNOSIS — Z98.890 OTHER SPECIFIED POSTPROCEDURAL STATES: Chronic | ICD-10-CM

## 2019-08-10 DIAGNOSIS — Z98.891 HISTORY OF UTERINE SCAR FROM PREVIOUS SURGERY: Chronic | ICD-10-CM

## 2019-08-10 DIAGNOSIS — I73.9 PERIPHERAL VASCULAR DISEASE, UNSPECIFIED: ICD-10-CM

## 2019-08-10 PROCEDURE — 93925 LOWER EXTREMITY STUDY: CPT | Mod: 26

## 2019-08-10 PROCEDURE — 93925 LOWER EXTREMITY STUDY: CPT

## 2019-08-14 DIAGNOSIS — Z71.89 OTHER SPECIFIED COUNSELING: ICD-10-CM

## 2019-08-16 ENCOUNTER — APPOINTMENT (OUTPATIENT)
Dept: PULMONOLOGY | Facility: CLINIC | Age: 59
End: 2019-08-16

## 2019-08-20 ENCOUNTER — APPOINTMENT (OUTPATIENT)
Dept: SURGERY | Facility: CLINIC | Age: 59
End: 2019-08-20

## 2019-08-20 DIAGNOSIS — Z85.3 ENCOUNTER FOR FOLLOW-UP EXAMINATION AFTER COMPLETED TREATMENT FOR MALIGNANT NEOPLASM: ICD-10-CM

## 2019-08-20 DIAGNOSIS — Z08 ENCOUNTER FOR FOLLOW-UP EXAMINATION AFTER COMPLETED TREATMENT FOR MALIGNANT NEOPLASM: ICD-10-CM

## 2019-08-21 ENCOUNTER — APPOINTMENT (OUTPATIENT)
Dept: RADIATION ONCOLOGY | Facility: CLINIC | Age: 59
End: 2019-08-21
Payer: MEDICAID

## 2019-08-21 VITALS
OXYGEN SATURATION: 94 % | HEART RATE: 90 BPM | DIASTOLIC BLOOD PRESSURE: 86 MMHG | TEMPERATURE: 98.1 F | RESPIRATION RATE: 16 BRPM | SYSTOLIC BLOOD PRESSURE: 122 MMHG | HEIGHT: 62 IN | WEIGHT: 186 LBS | BODY MASS INDEX: 34.23 KG/M2

## 2019-08-21 PROCEDURE — 99213 OFFICE O/P EST LOW 20 MIN: CPT

## 2019-08-21 NOTE — REASON FOR VISIT
[Routine Follow-Up] : routine follow-up visit for [Breast Cancer] : breast cancer [Other: _____] : [unfilled]

## 2019-08-21 NOTE — PHYSICAL EXAM
[Normal] : oriented to person, place and time, the affect was normal, the mood was normal and not anxious [de-identified] : left breast edema , hyperpigmentation and tenderness

## 2019-08-21 NOTE — ASSESSMENT
[No evidence of disease] : No evidence of disease [FreeTextEntry1] : moderate treatment related sequelae .

## 2019-08-21 NOTE — HISTORY OF PRESENT ILLNESS
[FreeTextEntry1] : The patient presents for routine follow up. She notes some persistent swelling and tenderness and tanning of her left treated breast\par Continues to be worked up for hernia and gallbladder surgery now by Dr. Finkelstein.\par

## 2019-08-21 NOTE — LETTER CLOSING
[Sincerely yours,] : Sincerely yours, [FreeTextEntry3] : Roland Fuentes MD\par Physician in Chief\par Department of Radiation Medicine\par [facility] [place]\par [facility] [place]\par \par  of Radiation Medicine\par [facility]\par at  [facility]\par \par Radiation \par [facility]/\par [facility] [place]\par [address]\par [place], [place] [address]\par \par Tel: [phone]\par Fax: [phone]\par

## 2019-08-21 NOTE — LETTER GREETING
[Dear Doctor] : Dear Doctor, [Follow-Up] : Your patient, [unfilled] was seen in my office today for follow-up [Please see my note below.] : Please see my note below. [FreeTextEntry2] : Rick Easton MD

## 2019-08-21 NOTE — DISEASE MANAGEMENT
[Pathological] : TNM Stage: p [IA] : IA [TTNM] : 1c [NTNM] : 0 [MTNM] : 0 [de-identified] : [id number] [de-identified] : left breast

## 2019-08-21 NOTE — VITALS
[Least Pain Intensity: 1/10] : 1/10 [Maximal Pain Intensity: 3/10] : 3/10 [OTC] : OTC [ECOG Performance Status: 1 - Restricted in physically strenuous activity but ambulatory and able to carry out work of a light or sedentary nature] : Performance Status: 1 - Restricted in physically strenuous activity but ambulatory and able to carry out work of a light or sedentary nature, e.g., light house work, office work [80: Normal activity with effort; some signs or symptoms of disease.] : 80: Normal activity with effort; some signs or symptoms of disease.

## 2019-08-21 NOTE — DATA REVIEWED
[No studies available for review at this time.] : No studies available for review at this time. [FreeTextEntry1] : labs and US July and Aug 2019

## 2019-08-27 ENCOUNTER — MEDICATION RENEWAL (OUTPATIENT)
Age: 59
End: 2019-08-27

## 2019-08-27 ENCOUNTER — APPOINTMENT (OUTPATIENT)
Dept: PULMONOLOGY | Facility: CLINIC | Age: 59
End: 2019-08-27
Payer: MEDICAID

## 2019-08-27 VITALS
BODY MASS INDEX: 34.57 KG/M2 | SYSTOLIC BLOOD PRESSURE: 128 MMHG | HEART RATE: 86 BPM | WEIGHT: 189 LBS | RESPIRATION RATE: 16 BRPM | OXYGEN SATURATION: 97 % | DIASTOLIC BLOOD PRESSURE: 72 MMHG

## 2019-08-27 PROCEDURE — 99406 BEHAV CHNG SMOKING 3-10 MIN: CPT

## 2019-08-27 PROCEDURE — 99214 OFFICE O/P EST MOD 30 MIN: CPT | Mod: 25

## 2019-08-27 NOTE — DISCUSSION/SUMMARY
[FreeTextEntry1] : COPD Gold class III, asthma component, nicotine addiction, at baseline\par  no fever, chill, chest pain\par exam without bronchospasm, normal sp02\par minute nodules on low dose rad CT scan stable 1/19  ( from 2017)\par smoking cessation discussed , 4 minutes\par continue  trelegy daily ( ICS,LABA,LAMA) and samples given\par has home nebulizer, prednisone taper and abx prn \par sleep study negative for JAGDISH, had nocturnal  hypoxemia, will repeat overnight oximetry given clinical improvement in COPD status\par 6 weeks or sooner if needed\par \par

## 2019-08-27 NOTE — REVIEW OF SYSTEMS
[Back Pain] : ~T back pain [As Noted in HPI] : as noted in HPI [Negative] : Dermatologic [FreeTextEntry3] : slight weight gain

## 2019-08-27 NOTE — HISTORY OF PRESENT ILLNESS
[FreeTextEntry1] : better than baseline\par no fever, chills, chest pain\par smoking again\par using trelegy\par Cardiology Dr Hamlin\par saw surgery, ? next few months\par \par

## 2019-08-27 NOTE — CONSULT LETTER
[Dear  ___] : Dear  [unfilled], [Consult Letter:] : I had the pleasure of evaluating your patient, [unfilled]. [Please see my note below.] : Please see my note below. [Sincerely,] : Sincerely, [Consult Closing:] : Thank you very much for allowing me to participate in the care of this patient.  If you have any questions, please do not hesitate to contact me. [DrRoby ___] : Dr. HOUGH [[facility], [facility]] : [facility], [facility] [Director, Respiratory Care] : Director, Respiratory Care [[facility]] : [facility] [FreeTextEntry3] : Mel[facility] Hop[facility] DO [facility]\par Pulmonary Critical Care\par Director Pulmonary Division\par Medical Director Respiratory Therapy\par [facility]\par \par

## 2019-08-27 NOTE — PHYSICAL EXAM
[General Appearance - Well Developed] : well developed [Normal Appearance] : normal appearance [Normal Conjunctiva] : the conjunctiva exhibited no abnormalities [General Appearance - Well Nourished] : well nourished [Normal Oropharynx] : normal oropharynx [II] : II [Neck Appearance] : the appearance of the neck was normal [Heart Rate And Rhythm] : heart rate and rhythm were normal [Heart Sounds] : normal S1 and S2 [Murmurs] : no murmurs present [Respiration, Rhythm And Depth] : normal respiratory rhythm and effort [Exaggerated Use Of Accessory Muscles For Inspiration] : no accessory muscle use [Auscultation Breath Sounds / Voice Sounds] : lungs were clear to auscultation bilaterally [Abdomen Soft] : soft [Abnormal Walk] : normal gait [Cyanosis, Localized] : no localized cyanosis [Nail Clubbing] : no clubbing of the fingernails [No Focal Deficits] : no focal deficits [] : no rash [Oriented To Time, Place, And Person] : oriented to person, place, and time [Affect] : the affect was normal [Impaired Insight] : insight and judgment were intact [Memory Recent] : recent memory was not impaired [FreeTextEntry1] : no chest wall abn

## 2019-08-28 ENCOUNTER — APPOINTMENT (OUTPATIENT)
Dept: INTERNAL MEDICINE | Facility: CLINIC | Age: 59
End: 2019-08-28
Payer: MEDICAID

## 2019-08-28 VITALS
HEIGHT: 62 IN | SYSTOLIC BLOOD PRESSURE: 136 MMHG | OXYGEN SATURATION: 95 % | HEART RATE: 92 BPM | BODY MASS INDEX: 34.78 KG/M2 | TEMPERATURE: 98.4 F | WEIGHT: 189 LBS | DIASTOLIC BLOOD PRESSURE: 82 MMHG

## 2019-08-28 DIAGNOSIS — N20.0 CALCULUS OF KIDNEY: ICD-10-CM

## 2019-08-28 PROCEDURE — 99214 OFFICE O/P EST MOD 30 MIN: CPT

## 2019-09-13 ENCOUNTER — APPOINTMENT (OUTPATIENT)
Dept: ORTHOPEDIC SURGERY | Facility: CLINIC | Age: 59
End: 2019-09-13

## 2019-09-17 ENCOUNTER — APPOINTMENT (OUTPATIENT)
Dept: CARDIOLOGY | Facility: CLINIC | Age: 59
End: 2019-09-17

## 2019-09-18 ENCOUNTER — OUTPATIENT (OUTPATIENT)
Dept: OUTPATIENT SERVICES | Facility: HOSPITAL | Age: 59
LOS: 1 days | Discharge: HOME | End: 2019-09-18

## 2019-09-18 DIAGNOSIS — Z98.890 OTHER SPECIFIED POSTPROCEDURAL STATES: Chronic | ICD-10-CM

## 2019-09-18 DIAGNOSIS — C50.919 MALIGNANT NEOPLASM OF UNSPECIFIED SITE OF UNSPECIFIED FEMALE BREAST: ICD-10-CM

## 2019-09-18 DIAGNOSIS — Z98.891 HISTORY OF UTERINE SCAR FROM PREVIOUS SURGERY: Chronic | ICD-10-CM

## 2019-09-19 ENCOUNTER — APPOINTMENT (OUTPATIENT)
Dept: HEMATOLOGY ONCOLOGY | Facility: CLINIC | Age: 59
End: 2019-09-19

## 2019-09-25 ENCOUNTER — MEDICATION RENEWAL (OUTPATIENT)
Age: 59
End: 2019-09-25

## 2019-09-30 ENCOUNTER — APPOINTMENT (OUTPATIENT)
Dept: SURGERY | Facility: CLINIC | Age: 59
End: 2019-09-30
Payer: MEDICAID

## 2019-09-30 VITALS
BODY MASS INDEX: 34.23 KG/M2 | HEIGHT: 62 IN | SYSTOLIC BLOOD PRESSURE: 123 MMHG | WEIGHT: 186 LBS | HEART RATE: 87 BPM | DIASTOLIC BLOOD PRESSURE: 81 MMHG

## 2019-09-30 DIAGNOSIS — R92.8 OTHER ABNORMAL AND INCONCLUSIVE FINDINGS ON DIAGNOSTIC IMAGING OF BREAST: ICD-10-CM

## 2019-09-30 PROBLEM — Z08 ENCOUNTER FOR FOLLOW-UP SURVEILLANCE OF BREAST CANCER: Status: ACTIVE | Noted: 2019-09-30

## 2019-09-30 PROCEDURE — 99204 OFFICE O/P NEW MOD 45 MIN: CPT

## 2019-09-30 NOTE — HISTORY OF PRESENT ILLNESS
[FreeTextEntry1] : Ms. Hill is a 58 year old female presenting today for initial evaluation for abnormal right breast imaging.  She has a history of invasive lobular carcinoma, ER+, MO+, HER2-  TNM Stage:  [id number] of the left breast cancer and is s/p left breast lumpectomy in December, 2018.    \par \par She is receiving radiation treatment with Dr. Roland Fuentes and has also seen Dr. Rick Easton who noted that due to her Oncotype DX Recurrence Score of 2/28/19 of 17.5% risk at 9 years, that chemotherapy benefit is at <1%.  Therefore she completed radiation treatment and thereafter begin Anastrazole hormone therapy.\par \par Final Pathology (12/14/18)\par 1.  Left Breast Mass, Surgical Excision\par -  Two lymph nodes, negative for metastatic disease (0/2).\par 2.   Left Breast Mass, unoriented surgical excision, with needle localization wire:\par -  S/P Previously excised invasive lobular carcinoma, 1.1 cm with a negative margin and breast receptor studies.\par 3.  Left breast mass, unoriented surgical excision:\par -  Two lymph nodes, negative for metastatic carcinoma (0/2).\par 4.  Left breast old scar, surgical excision:  \par -  Skin with hypertrophic scar.\par \par Imaging:\par US Breast -Bilateral 6/24/19:\par RIGHT BREAST:  No suspicious solid mass.  \par -  9:00-10:00 position, 8cm  from the nipple a newly noted 0.6 x 0.4 x 0.6 cm circumscribed hypoechoic mass;\par -   9:00 position, 6cm from the nipple there is a 0.5 cm cyst.\par \par LEFT BREAST:  No suspicious solid mass. \par -   Postoperative scarring demonstrated at the 1:00 axis, 14cm from the nipple and at the 3:00 axis, 13cm from the nipple.\par -  11:00, 11cm from the nipple, there is a 0.9 x 0.4 x 0.6cm hypoechoic mass, status post benign biopsy, decreased in size. \par -  No focal or fluid collections or axillary adenopathy within the left axilla, area of pain to patient.  \par \par IMPRESSION:  \par -  Benign appearing mass i the right breast at the 9:00-10:00 position. Recommendation is for a targeted right breast ultrasound in 6 months to ascertain stablility.  No adenopathy or suspicious findings in the left axillae, area of pain.  Recommendation is management on clinical grounds\par BIRADS 3.\par \par Mammogram 6/24/19:\par No mammographic evidence of malignancy in the left breast.  Post treatment changes in the left breast.   Patient declined right screening mammography at this visit.  \par \par \par 7/22/19 - Mammogram - Right:\par RIGHT BREAST:  No mammographic evidence of malignancy in the right breast.\par BIRADS - 1\par \par \par We reviewed the clinical examination, recent breast imaging and recommendations. She understands that recommendation is for repeat right breast ultrasound in 6 months and I agree with the recommendation.\par \par She understands and agrees to the plan.\par \par

## 2019-09-30 NOTE — HISTORY OF PRESENT ILLNESS
[FreeTextEntry1] : Ms. Hill is a 58 year old female presenting today for initial evaluation for abnormal right breast imaging. She has a history of invasive lobular carcinoma, ER+, AL+, HER2- TNM Stage: [id number] of the left breast cancer and is s/p left breast lumpectomy in December, 2018. \par \par She has received radiation treatment with Dr. Roland Fuentes and has also seen Dr. Rick Easton who noted that due to her Oncotype DX Recurrence Score of 2/28/19 of 17.5% risk at 9 years, that chemotherapy benefit is at <1%. Therefore she completed radiation treatment and thereafter begin Anastrazole hormone therapy.  She has developed severe lymphedema of the left breast and complains of moderate to severe pain. She states that she would like to see if she can undergo a mastectomy.  I have counseled her on lymphedema of the breast and risks involved with surgery. \par \par She is scheduled to undergo abdominal hernia repair(s) with Dr. Finkelstein and I discussed the potential option of undergoing mastectomy with JESUS reconstruction, however Dr. Lao (Plastics) would need to weigh in on specific risks of surgery and integrity of skin.  This may not be the opportune time to perform such a surgery.\par \par Final Pathology (12/14/18)\par 1. Left Breast Mass, Surgical Excision\par - Two lymph nodes, negative for metastatic disease (0/2).\par 2. Left Breast Mass, unoriented surgical excision, with needle localization wire:\par - S/P Previously excised invasive lobular carcinoma, 1.1 cm with a negative margin and breast receptor studies.\par 3. Left breast mass, unoriented surgical excision:\par - Two lymph nodes, negative for metastatic carcinoma (0/2).\par 4. Left breast old scar, surgical excision: \par - Skin with hypertrophic scar.\par \par Imaging:\par US Breast -Bilateral 6/24/19:\par RIGHT BREAST: No suspicious solid mass. \par - 9:00-10:00 position, 8cm from the nipple a newly noted 0.6 x 0.4 x 0.6 cm circumscribed hypoechoic mass;\par - 9:00 position, 6cm from the nipple there is a 0.5 cm cyst.\par \par LEFT BREAST: No suspicious solid mass. \par - Postoperative scarring demonstrated at the 1:00 axis, 14cm from the nipple and at the 3:00 axis, 13cm from the nipple.\par - 11:00, 11cm from the nipple, there is a 0.9 x 0.4 x 0.6cm hypoechoic mass, status post benign biopsy, decreased in size. \par - No focal or fluid collections or axillary adenopathy within the left axilla, area of pain to patient. \par \par IMPRESSION: \par - Benign appearing mass i the right breast at the 9:00-10:00 position. Recommendation is for a targeted right breast ultrasound in 6 months to ascertain stablility. No adenopathy or suspicious findings in the left axillae, area of pain. Recommendation is management on clinical grounds\par BIRADS 3.\par \par Mammogram 6/24/19:\par No mammographic evidence of malignancy in the left breast. Post treatment changes in the left breast. Patient declined right screening mammography at this visit. \par \par \par 7/22/19 - Mammogram - Right:\par RIGHT BREAST: No mammographic evidence of malignancy in the right breast.\par BIRADS - 1\par \par \par We reviewed the clinical examination, recent breast imaging and recommendations. She understands that recommendation is for repeat right breast ultrasound in 6 months and I agree with the recommendation. We will arrange consultation with Dr. Lao and follow up for further discussion.\par \par She understands and agrees to the plan.\par \par

## 2019-09-30 NOTE — ASSESSMENT
[FreeTextEntry1] : 57 yo postmenopausal female with a history of left breast cancer (T1c) presents for review of abnormal right breast imaging which demonstrated a benign appearing mass in the right breast at the 9-10:00 position for which short term follow up was recommended. Clinical examination is stable and repeat right breast ultrasound is recommended 12/2019.  The patient understands and agrees to the plan.\par 1. Right breast ultrasound 12/2019\par 2. Clinical examination in 3 months 9/2019\par 3. Bilateral screening mammogram 7/2019

## 2019-09-30 NOTE — ASSESSMENT
[FreeTextEntry1] : 59 yo postmenopausal female with a history of left breast cancer (T1c) presents for review of abnormal right breast imaging which demonstrated a benign appearing mass in the right breast at the 9-10:00 position for which short term follow up was recommended. Clinical examination is stable and repeat right breast ultrasound is recommended 12/2019. The patient understands and agrees to the plan. In terms of lymphedema of the left breast and options for mastectomy, recommendation to consult with Dr. Lao to see if there is a possibility for coordination of care/hernia repair.\par 1. Right breast ultrasound 12/2019\par 2. Follow up in 3 weeks\par 3. Bilateral screening mammogram 7/2020\par 4. Consult with Dr. Lao\par \par

## 2019-09-30 NOTE — PHYSICAL EXAM
[Normocephalic] : normocephalic [Atraumatic] : atraumatic [EOMI] : extra ocular movement intact [PERRL] : pupils equal, round and reactive to light [Sclera nonicteric] : sclera nonicteric [Supple] : supple [No Supraclavicular Adenopathy] : no supraclavicular adenopathy [Examined in the supine and seated position] : examined in the supine and seated position [No dominant masses] : no dominant masses in right breast  [No dominant masses] : no dominant masses left breast [No Nipple Retraction] : no left nipple retraction [No Nipple Discharge] : no left nipple discharge [No Axillary Lymphadenopathy] : no left axillary lymphadenopathy [No Edema] : no edema [No Rashes] : no rashes [No Ulceration] : no ulceration [de-identified] : No supraclavicular or axillary adenopathy. No dominant masses, normal to palpation. Everted nipple without discharge. No skin changes.  [de-identified] : No supraclavicular or axillary adenopathy. No dominant masses, moderate induration and hyperpigmentation secondary to radiation. Everted nipple without discharge.  [de-identified] : large left flank lipoma

## 2019-09-30 NOTE — PHYSICAL EXAM
[Normocephalic] : normocephalic [EOMI] : extra ocular movement intact [Atraumatic] : atraumatic [Sclera nonicteric] : sclera nonicteric [PERRL] : pupils equal, round and reactive to light [Examined in the supine and seated position] : examined in the supine and seated position [Supple] : supple [No Supraclavicular Adenopathy] : no supraclavicular adenopathy [No dominant masses] : no dominant masses in right breast  [No dominant masses] : no dominant masses left breast [No Nipple Retraction] : no left nipple retraction [No Nipple Discharge] : no left nipple discharge [No Axillary Lymphadenopathy] : no left axillary lymphadenopathy [No Rashes] : no rashes [No Ulceration] : no ulceration [No Edema] : no edema

## 2019-10-02 ENCOUNTER — APPOINTMENT (OUTPATIENT)
Dept: INTERNAL MEDICINE | Facility: CLINIC | Age: 59
End: 2019-10-02
Payer: MEDICAID

## 2019-10-08 ENCOUNTER — MEDICATION RENEWAL (OUTPATIENT)
Age: 59
End: 2019-10-08

## 2019-10-10 ENCOUNTER — APPOINTMENT (OUTPATIENT)
Dept: PLASTIC SURGERY | Facility: CLINIC | Age: 59
End: 2019-10-10
Payer: MEDICAID

## 2019-10-10 VITALS
WEIGHT: 187 LBS | HEIGHT: 62 IN | BODY MASS INDEX: 34.41 KG/M2 | OXYGEN SATURATION: 98 % | RESPIRATION RATE: 18 BRPM | HEART RATE: 90 BPM

## 2019-10-10 PROCEDURE — 99203 OFFICE O/P NEW LOW 30 MIN: CPT

## 2019-10-14 ENCOUNTER — APPOINTMENT (OUTPATIENT)
Dept: INTERNAL MEDICINE | Facility: CLINIC | Age: 59
End: 2019-10-14
Payer: MEDICAID

## 2019-10-14 VITALS
OXYGEN SATURATION: 98 % | HEIGHT: 62 IN | TEMPERATURE: 99.3 F | WEIGHT: 188 LBS | BODY MASS INDEX: 34.6 KG/M2 | SYSTOLIC BLOOD PRESSURE: 125 MMHG | DIASTOLIC BLOOD PRESSURE: 70 MMHG | HEART RATE: 91 BPM

## 2019-10-14 PROCEDURE — 99214 OFFICE O/P EST MOD 30 MIN: CPT | Mod: 25

## 2019-10-14 PROCEDURE — 36415 COLL VENOUS BLD VENIPUNCTURE: CPT

## 2019-10-14 NOTE — PHYSICAL EXAM
[Normal Sclera/Conjunctiva] : normal sclera/conjunctiva [Normal Outer Ear/Nose] : the outer ears and nose were normal in appearance [No Acute Distress] : no acute distress [No Accessory Muscle Use] : no accessory muscle use [No Respiratory Distress] : no respiratory distress  [Normal Rate] : normal rate  [Clear to Auscultation] : lungs were clear to auscultation bilaterally [Regular Rhythm] : with a regular rhythm [No Murmur] : no murmur heard [Normal S1, S2] : normal S1 and S2 [Alert and Oriented x3] : oriented to person, place, and time

## 2019-10-14 NOTE — REVIEW OF SYSTEMS
[Fever] : fever [Diarrhea] : diarrhea [Constipation] : constipation [Fatigue] : fatigue [Incontinence] : incontinence [Negative] : Cardiovascular

## 2019-10-15 ENCOUNTER — APPOINTMENT (OUTPATIENT)
Dept: CARDIOLOGY | Facility: CLINIC | Age: 59
End: 2019-10-15

## 2019-10-15 ENCOUNTER — APPOINTMENT (OUTPATIENT)
Dept: SURGERY | Facility: CLINIC | Age: 59
End: 2019-10-15
Payer: MEDICAID

## 2019-10-15 VITALS
SYSTOLIC BLOOD PRESSURE: 124 MMHG | DIASTOLIC BLOOD PRESSURE: 87 MMHG | BODY MASS INDEX: 34.41 KG/M2 | WEIGHT: 187 LBS | HEIGHT: 62 IN | HEART RATE: 74 BPM

## 2019-10-15 LAB
ALBUMIN SERPL ELPH-MCNC: 4.1 G/DL
ALP BLD-CCNC: 73 U/L
ALT SERPL-CCNC: 10 U/L
AMYLASE/CREAT SERPL: 36 U/L
ANION GAP SERPL CALC-SCNC: 13 MMOL/L
AST SERPL-CCNC: 16 U/L
BASOPHILS # BLD AUTO: 0.04 K/UL
BASOPHILS NFR BLD AUTO: 0.8 %
BILIRUB SERPL-MCNC: 0.2 MG/DL
BUN SERPL-MCNC: 28 MG/DL
CALCIUM SERPL-MCNC: 9.5 MG/DL
CHLORIDE SERPL-SCNC: 105 MMOL/L
CO2 SERPL-SCNC: 25 MMOL/L
CREAT SERPL-MCNC: 1.59 MG/DL
EOSINOPHIL # BLD AUTO: 0.15 K/UL
EOSINOPHIL NFR BLD AUTO: 2.9 %
GLUCOSE SERPL-MCNC: 100 MG/DL
HCT VFR BLD CALC: 36.9 %
HGB BLD-MCNC: 11.7 G/DL
IMM GRANULOCYTES NFR BLD AUTO: 0.4 %
LPL SERPL-CCNC: 42 U/L
LYMPHOCYTES # BLD AUTO: 1.23 K/UL
LYMPHOCYTES NFR BLD AUTO: 24 %
MAN DIFF?: NORMAL
MCHC RBC-ENTMCNC: 31.7 GM/DL
MCHC RBC-ENTMCNC: 33.3 PG
MCV RBC AUTO: 105.1 FL
MONOCYTES # BLD AUTO: 0.5 K/UL
MONOCYTES NFR BLD AUTO: 9.7 %
NEUTROPHILS # BLD AUTO: 3.19 K/UL
NEUTROPHILS NFR BLD AUTO: 62.2 %
PLATELET # BLD AUTO: 275 K/UL
POTASSIUM SERPL-SCNC: 3.9 MMOL/L
PROT SERPL-MCNC: 6.6 G/DL
RBC # BLD: 3.51 M/UL
RBC # FLD: 13.2 %
SODIUM SERPL-SCNC: 143 MMOL/L
WBC # FLD AUTO: 5.13 K/UL

## 2019-10-15 PROCEDURE — 99215 OFFICE O/P EST HI 40 MIN: CPT

## 2019-10-15 NOTE — HISTORY OF PRESENT ILLNESS
[FreeTextEntry1] : Kemi is a jv 59 yo postmenopausal female who presents with recently treated left breast cancer with breast conservation.\par \par Kemi is in the office crying about her social issues including her daughter who she states she is requesting to leave her from her home.  She also describes lower back pain and the recent loss of her mother 3 months ago.  She states that her daughter is mentally incompetent and receives social security.  Kemi is demonstrating flight of ideas with rushed speech moving from topic to topic.  She had to be redirected to focus on her current breast issues.  \par \par She was asked what her goals of surgery are: she states that she wants her breasts off and she doesn't want to the pain anymore.  She was informed that even with removal of both breasts, there could be pain related to radiation because of the risk of radiation changes to the chest wall.  She can also have pain after mastectomy.  She states that she understands but secondary to the chronic lymphedema of the left breast and the pain and heaviness, that she would like to undergo mastectomy without reconstruction but with oncoplastic skin closure.  We discussed the technical aspects of surgery at length.  She wants to undergo bilateral mastectomy and she understands risks v benefits in addition to the possibility of persisting pain.  We discussed the upcoming abdominal/hernia repair and she will follow up after surgery in January 2020 to discuss option for risk reduction bilateral mastectomy further. \par \par She states that she wants to litigate her previous breast surgeon, however I have explained to her that she underwent successful surgical care for malignancy, and that development of lymphedema of the breast is a known complication of radiation treatment.  She understands and agrees to follow up.  Recommendation is for medical oncology and radiation oncology follow up as well.\par \par Recommendation is for MRI of the breast at follow up.  \par In addition I have recommended Kemi undergo evaluation by therapist/psychiatrist in order to help her cope with current stressors.  She understands and agrees to the plan.\par \par All questions were answered.

## 2019-10-15 NOTE — HISTORY OF PRESENT ILLNESS
[FreeTextEntry1] : f/u for breast cancer, abdominal discomfort, ventral hernia, and COPD.  [de-identified] : Pt presents for follow up. She saw Dr. Goldstein, breast surgeon, and Dr. Lao, plastic surgeon, and she is going to have a double mastectomy on 11/19. She states that she is incontinent and has to wear diapers every day. She also has both constipation and diarrhea.

## 2019-10-15 NOTE — PHYSICAL EXAM
[Atraumatic] : atraumatic [Normocephalic] : normocephalic [PERRL] : pupils equal, round and reactive to light [EOMI] : extra ocular movement intact [Sclera nonicteric] : sclera nonicteric [Supple] : supple [No dominant masses] : no dominant masses in right breast  [Examined in the supine and seated position] : examined in the supine and seated position [No Supraclavicular Adenopathy] : no supraclavicular adenopathy [No dominant masses] : no dominant masses left breast [No Nipple Retraction] : no left nipple retraction [No Nipple Discharge] : no left nipple discharge [No Axillary Lymphadenopathy] : no left axillary lymphadenopathy [No Rashes] : no rashes [No Edema] : no edema [No Ulceration] : no ulceration [de-identified] : No supraclavicular or axillary adenopathy. No dominant masses, normal to palpation. Everted nipple without discharge. No skin changes.  [de-identified] : No supraclavicular or axillary adenopathy. Large ptotic breast. No dominant masses, moderate induration and hyperpigmentation secondary to radiation. Everted nipple without discharge.

## 2019-10-15 NOTE — ASSESSMENT
[FreeTextEntry1] : 57 yo postmenopausal female with history of Stage 1 left breast cancer  T1cN0, treatment with lumpectomy, radiation and hormonal therapy (partially compliant). Bilateral screening mammogram/ultrasound 6/24/2019 BIRADS 3: \par 1. Targeted right breast ultrasound in 12/2019\par 2. Bilateral screening mammogram/ultrasound 6/2020\par 3. Clinical breast follow up in 4/2020 (or sooner if desires, Jan 2020)\par

## 2019-10-15 NOTE — ASSESSMENT
[FreeTextEntry1] : She will return in 4-6 weeks for f/u and medical clearance before her surgery. She has an appointment with Dr. Carbone on 11/29 for her back pain.

## 2019-10-15 NOTE — END OF VISIT
[FreeTextEntry3] : I, Marcia Schultz, acted solely as a scribe for Dr. Peterson on this date 10/14/2019.\par \par All medical record entries made by the Scribe were at my, Dr. Peterson's direction and personally dictated by me on 10/14/2019. I have reviewed the chart and agree that the record accurately reflects my personal performance of the history, physical exam, assessment and plan. I have also personally directed, reviewed, and agreed with the chart.

## 2019-10-17 ENCOUNTER — FORM ENCOUNTER (OUTPATIENT)
Age: 59
End: 2019-10-17

## 2019-10-18 ENCOUNTER — OUTPATIENT (OUTPATIENT)
Dept: OUTPATIENT SERVICES | Facility: HOSPITAL | Age: 59
LOS: 1 days | End: 2019-10-18
Payer: COMMERCIAL

## 2019-10-18 ENCOUNTER — APPOINTMENT (OUTPATIENT)
Dept: RADIOLOGY | Facility: CLINIC | Age: 59
End: 2019-10-18
Payer: MEDICAID

## 2019-10-18 DIAGNOSIS — Z98.891 HISTORY OF UTERINE SCAR FROM PREVIOUS SURGERY: Chronic | ICD-10-CM

## 2019-10-18 DIAGNOSIS — Z98.890 OTHER SPECIFIED POSTPROCEDURAL STATES: Chronic | ICD-10-CM

## 2019-10-18 DIAGNOSIS — M48.061 SPINAL STENOSIS, LUMBAR REGION WITHOUT NEUROGENIC CLAUDICATION: ICD-10-CM

## 2019-10-18 PROCEDURE — 72100 X-RAY EXAM L-S SPINE 2/3 VWS: CPT | Mod: 26

## 2019-10-18 PROCEDURE — 72100 X-RAY EXAM L-S SPINE 2/3 VWS: CPT

## 2019-10-22 ENCOUNTER — MEDICATION RENEWAL (OUTPATIENT)
Age: 59
End: 2019-10-22

## 2019-10-25 ENCOUNTER — MEDICATION RENEWAL (OUTPATIENT)
Age: 59
End: 2019-10-25

## 2019-10-29 ENCOUNTER — APPOINTMENT (OUTPATIENT)
Dept: ORTHOPEDIC SURGERY | Facility: CLINIC | Age: 59
End: 2019-10-29
Payer: MEDICAID

## 2019-10-29 VITALS
BODY MASS INDEX: 34.41 KG/M2 | DIASTOLIC BLOOD PRESSURE: 73 MMHG | SYSTOLIC BLOOD PRESSURE: 108 MMHG | HEIGHT: 62 IN | HEART RATE: 76 BPM | WEIGHT: 187 LBS

## 2019-10-29 DIAGNOSIS — R92.8 OTHER ABNORMAL AND INCONCLUSIVE FINDINGS ON DIAGNOSTIC IMAGING OF BREAST: ICD-10-CM

## 2019-10-29 PROCEDURE — 99205 OFFICE O/P NEW HI 60 MIN: CPT

## 2019-10-29 NOTE — HISTORY OF PRESENT ILLNESS
[Incontinence] : incontinence [de-identified] : 58 year old female presents for lumbar spine pain. Pt has had worsening back pain and sciatica. Pt reports she broke coccyx bone when she was 12. She notes tramadol does not provide relief. She has 24 mg of Ibuprofen. Pain is described as constant ans stabbing. She is s/p 2 lumpectomies.Pt states she is scheduled for a hernia repair 11/19/19. Pt has not tried PT, chiropractic care or pain management at this time. Hx of Breast CA., COPD, Asthma, HLD, HTN, etc.  [Ataxia] : no ataxia [Loss of Dexterity] : good dexterity [Urinary Ret.] : no urinary retention

## 2019-10-29 NOTE — PHYSICAL EXAM
[de-identified] : CONSTITUTIONAL: The patient is a very pleasant individual who is well-nourished and who appears stated age.\par PSYCHIATRIC: The patient is alert and oriented X 3 and in no apparent distress, and participates with orthopedic evaluation well.\par HEAD: Atraumatic and is nonsyndromic in appearance.\par EENT: No visible thyromegaly, EOMI.\par RESPIRATORY: Respiratory rate is regular, not dyspneic on examination.\par LYMPHATICS: There is no inguinal lymphadenopathy\par INTEGUMENTARY: Skin is clean, dry, and intact about the bilateral lower extremities and lumbar spine.\par VASCULAR: There is brisk capillary refill about the bilateral lower extremities.\par NEUROLOGIC: There are no pathologic reflexes. There is no decrease in sensation of the bilateral lower extremities on Wartenberg pinwheel/manual examination. Deep tendon reflexes are well maintained at 2+/4 of the bilateral lower extremities and are symmetric. Subjective neurogenic claudication.\par MUSCULOSKELETAL: There is no visible muscular atrophy. Manual motor strength is well maintained in the bilateral lower extremities. Range of motion of lumbar spine is well maintained. The patient ambulates in a non-myelopathic manner. Negative tension sign and straight leg raise bilaterally. Quad extension, ankle dorsiflexion, EHL, plantar flexion, and ankle eversion are well preserved. Normal secondary orthopaedic exam of bilateral hips, greater trochanteric area, knees and ankles. \par Mechanically oriented low back pain.  [de-identified] : Xray of the lumbar spine taken at [facility] and reviewed today shows L5-S1 spondylolisthesis as well as degenerative rotoscoliosis.

## 2019-10-29 NOTE — DISCUSSION/SUMMARY
[de-identified] : Based upon recent hx of breast CA/,s/p lumpectomy, radiation therapy, a MRI has been ordered secondary to persistent pain and to evaluate for  pathologic processes with regard to lumbar spine. MRI  is medically necessary to determine a further treatment plan which will be injection therapy versus spinal surgery. Pt was referred to pain management for the time being for possible injection therapy. The pt will follow up once MRI has been obtained.\par  \par Patient with multiple medical comorbidity increasing the risk of perioperative and postoperative complications as well as diminished spine outcomes as per the current medical literature. These include but are not limited to obesity, anxiety/depression, cardiac illness, kidney disease, peripheral vascular disease, history of cancer, COPD, dysmetabolic syndrome including but not limited to diabetes, hyperlipidemia, hypertension. Patient is being referred back to primary care provider for medical optimization, as well as other appropriate specialists as needed for optimization prior to spine surgery.

## 2019-10-29 NOTE — ADDENDUM
[FreeTextEntry1] : Documented by Norma Winter acting as a scribe for Dr. Beau Carbone. 10/29/2019\par \par All medical record entries made by the Scribe were at my, Dr. Beau Carbone, direction and personally dictated by me on 10/29/2019. I have reviewed the chart and agree that the record accurately reflects my personal performance of the history, physical exam, assessment and plan. I have also personally directed, reviewed, and agreed with the chart.

## 2019-11-01 ENCOUNTER — FORM ENCOUNTER (OUTPATIENT)
Age: 59
End: 2019-11-01

## 2019-11-01 ENCOUNTER — APPOINTMENT (OUTPATIENT)
Dept: PULMONOLOGY | Facility: CLINIC | Age: 59
End: 2019-11-01
Payer: MEDICAID

## 2019-11-01 VITALS — HEIGHT: 61.5 IN | BODY MASS INDEX: 36.16 KG/M2 | WEIGHT: 194 LBS

## 2019-11-01 VITALS — HEART RATE: 98 BPM | SYSTOLIC BLOOD PRESSURE: 120 MMHG | OXYGEN SATURATION: 95 % | DIASTOLIC BLOOD PRESSURE: 80 MMHG

## 2019-11-01 PROCEDURE — 94010 BREATHING CAPACITY TEST: CPT

## 2019-11-01 PROCEDURE — 99214 OFFICE O/P EST MOD 30 MIN: CPT | Mod: 25

## 2019-11-01 NOTE — DISCUSSION/SUMMARY
[FreeTextEntry1] : COPD Gold class III, asthma component, nicotine addiction, at baseline\par  no fever, chill, chest pain,. dyspnea at baseline\par exam without bronchospasm, normal sp02\par minute nodules on low dose rad CT scan stable 1/19  , repeat low dose rad 1/20\par continued smoking cessation\par continue  trelegy daily ( ICS,LABA,LAMA) and samples given\par spirometry improved, moderate air flow obstruction\par sleep study negative for JAGDISH,\par No pulmonary contraindications to proposed procedure\par At increased risk of post operative pulmonary complications\par need to weigh risk and benefits\par incentive spirometry, DVT prophylaxis\par duo neb prior to induction and q 6 hrs\par monitored bed, continuous sp02\par Cardiology input pending\par vaccinations this fall \par 3 months  or sooner if needed\par \par

## 2019-11-01 NOTE — PHYSICAL EXAM
[General Appearance - Well Developed] : well developed [Normal Appearance] : normal appearance [General Appearance - Well Nourished] : well nourished [Normal Conjunctiva] : the conjunctiva exhibited no abnormalities [Normal Oropharynx] : normal oropharynx [II] : II [Neck Appearance] : the appearance of the neck was normal [Heart Rate And Rhythm] : heart rate and rhythm were normal [Heart Sounds] : normal S1 and S2 [Murmurs] : no murmurs present [Respiration, Rhythm And Depth] : normal respiratory rhythm and effort [Exaggerated Use Of Accessory Muscles For Inspiration] : no accessory muscle use [Auscultation Breath Sounds / Voice Sounds] : lungs were clear to auscultation bilaterally [Abdomen Soft] : soft [Abnormal Walk] : normal gait [Nail Clubbing] : no clubbing of the fingernails [Cyanosis, Localized] : no localized cyanosis [] : no rash [No Focal Deficits] : no focal deficits [Oriented To Time, Place, And Person] : oriented to person, place, and time [Impaired Insight] : insight and judgment were intact [Affect] : the affect was normal [Memory Recent] : recent memory was not impaired [FreeTextEntry1] : no chest wall abn

## 2019-11-01 NOTE — HISTORY OF PRESENT ILLNESS
[FreeTextEntry1] : at baseline\par no fever, chills, chest pain\par smoking again\par using trelegy\par Cardiology Dr Hamlin\par for hernia repair and GB surgery, Dr Finkelstein\par \par

## 2019-11-01 NOTE — REVIEW OF SYSTEMS
[As Noted in HPI] : as noted in HPI [Back Pain] : ~T back pain [Negative] : Dermatologic [FreeTextEntry3] : slight weight gain

## 2019-11-01 NOTE — PROCEDURE
[FreeTextEntry1] : CT chest 21/19: stable minute nodules from 11/17\par Sleep study 9/18: no sig JAGDISH, nocturnal hypoxemia\par spirometry moderate air flow obstruction, improved FVC and FEV1 from 12/18

## 2019-11-01 NOTE — CONSULT LETTER
[Dear  ___] : Dear  [unfilled], [Consult Letter:] : I had the pleasure of evaluating your patient, [unfilled]. [Please see my note below.] : Please see my note below. [Consult Closing:] : Thank you very much for allowing me to participate in the care of this patient.  If you have any questions, please do not hesitate to contact me. [Sincerely,] : Sincerely, [Melvin Hopson DO, [facility]] : Melvin Hopson DO, [facility] [Director, Respiratory Care] : Director, Respiratory Care [[facility]] : [facility] [FreeTextEntry3] : [facility]\par Pulmonary Critical Care\par Director Pulmonary Division\par Medical Director Respiratory Therapy\par [facility]\par \par  [DrRoby  ___] : Dr. HOUGH [DrRoby ___] : Dr. HOUGH

## 2019-11-02 ENCOUNTER — APPOINTMENT (OUTPATIENT)
Dept: MRI IMAGING | Facility: CLINIC | Age: 59
End: 2019-11-02
Payer: MEDICAID

## 2019-11-02 ENCOUNTER — OUTPATIENT (OUTPATIENT)
Dept: OUTPATIENT SERVICES | Facility: HOSPITAL | Age: 59
LOS: 1 days | End: 2019-11-02
Payer: COMMERCIAL

## 2019-11-02 DIAGNOSIS — Z98.891 HISTORY OF UTERINE SCAR FROM PREVIOUS SURGERY: Chronic | ICD-10-CM

## 2019-11-02 DIAGNOSIS — Z98.890 OTHER SPECIFIED POSTPROCEDURAL STATES: Chronic | ICD-10-CM

## 2019-11-02 DIAGNOSIS — M48.061 SPINAL STENOSIS, LUMBAR REGION WITHOUT NEUROGENIC CLAUDICATION: ICD-10-CM

## 2019-11-02 PROCEDURE — 72148 MRI LUMBAR SPINE W/O DYE: CPT

## 2019-11-02 PROCEDURE — 72148 MRI LUMBAR SPINE W/O DYE: CPT | Mod: 26

## 2019-11-05 ENCOUNTER — OUTPATIENT (OUTPATIENT)
Dept: OUTPATIENT SERVICES | Facility: HOSPITAL | Age: 59
LOS: 1 days | End: 2019-11-05
Payer: COMMERCIAL

## 2019-11-05 VITALS
HEART RATE: 76 BPM | WEIGHT: 191.58 LBS | HEIGHT: 63 IN | RESPIRATION RATE: 20 BRPM | SYSTOLIC BLOOD PRESSURE: 90 MMHG | TEMPERATURE: 97 F | DIASTOLIC BLOOD PRESSURE: 55 MMHG

## 2019-11-05 DIAGNOSIS — I10 ESSENTIAL (PRIMARY) HYPERTENSION: ICD-10-CM

## 2019-11-05 DIAGNOSIS — K43.9 VENTRAL HERNIA WITHOUT OBSTRUCTION OR GANGRENE: ICD-10-CM

## 2019-11-05 DIAGNOSIS — Z98.891 HISTORY OF UTERINE SCAR FROM PREVIOUS SURGERY: Chronic | ICD-10-CM

## 2019-11-05 DIAGNOSIS — K80.50 CALCULUS OF BILE DUCT WITHOUT CHOLANGITIS OR CHOLECYSTITIS WITHOUT OBSTRUCTION: ICD-10-CM

## 2019-11-05 DIAGNOSIS — Z98.890 OTHER SPECIFIED POSTPROCEDURAL STATES: Chronic | ICD-10-CM

## 2019-11-05 DIAGNOSIS — Z01.818 ENCOUNTER FOR OTHER PREPROCEDURAL EXAMINATION: ICD-10-CM

## 2019-11-05 DIAGNOSIS — Z29.9 ENCOUNTER FOR PROPHYLACTIC MEASURES, UNSPECIFIED: ICD-10-CM

## 2019-11-05 DIAGNOSIS — J44.9 CHRONIC OBSTRUCTIVE PULMONARY DISEASE, UNSPECIFIED: ICD-10-CM

## 2019-11-05 LAB
ANION GAP SERPL CALC-SCNC: 12 MMOL/L — SIGNIFICANT CHANGE UP (ref 5–17)
APTT BLD: 29.2 SEC — SIGNIFICANT CHANGE UP (ref 27.5–36.3)
BASOPHILS # BLD AUTO: 0.03 K/UL — SIGNIFICANT CHANGE UP (ref 0–0.2)
BASOPHILS NFR BLD AUTO: 0.7 % — SIGNIFICANT CHANGE UP (ref 0–2)
BLD GP AB SCN SERPL QL: SIGNIFICANT CHANGE UP
BUN SERPL-MCNC: 37 MG/DL — HIGH (ref 8–20)
CALCIUM SERPL-MCNC: 8.9 MG/DL — SIGNIFICANT CHANGE UP (ref 8.6–10.2)
CHLORIDE SERPL-SCNC: 106 MMOL/L — SIGNIFICANT CHANGE UP (ref 98–107)
CO2 SERPL-SCNC: 24 MMOL/L — SIGNIFICANT CHANGE UP (ref 22–29)
CREAT SERPL-MCNC: 1.84 MG/DL — HIGH (ref 0.5–1.3)
EOSINOPHIL # BLD AUTO: 0.2 K/UL — SIGNIFICANT CHANGE UP (ref 0–0.5)
EOSINOPHIL NFR BLD AUTO: 4.8 % — SIGNIFICANT CHANGE UP (ref 0–6)
GLUCOSE SERPL-MCNC: 100 MG/DL — SIGNIFICANT CHANGE UP (ref 70–115)
HBA1C BLD-MCNC: 5.2 % — SIGNIFICANT CHANGE UP (ref 4–5.6)
HCT VFR BLD CALC: 35.6 % — SIGNIFICANT CHANGE UP (ref 34.5–45)
HGB BLD-MCNC: 11.5 G/DL — SIGNIFICANT CHANGE UP (ref 11.5–15.5)
IMM GRANULOCYTES NFR BLD AUTO: 0.5 % — SIGNIFICANT CHANGE UP (ref 0–1.5)
INR BLD: 0.81 RATIO — LOW (ref 0.88–1.16)
LYMPHOCYTES # BLD AUTO: 1.1 K/UL — SIGNIFICANT CHANGE UP (ref 1–3.3)
LYMPHOCYTES # BLD AUTO: 26.4 % — SIGNIFICANT CHANGE UP (ref 13–44)
MCHC RBC-ENTMCNC: 32.3 GM/DL — SIGNIFICANT CHANGE UP (ref 32–36)
MCHC RBC-ENTMCNC: 33.8 PG — SIGNIFICANT CHANGE UP (ref 27–34)
MCV RBC AUTO: 104.7 FL — HIGH (ref 80–100)
MONOCYTES # BLD AUTO: 0.49 K/UL — SIGNIFICANT CHANGE UP (ref 0–0.9)
MONOCYTES NFR BLD AUTO: 11.8 % — SIGNIFICANT CHANGE UP (ref 2–14)
NEUTROPHILS # BLD AUTO: 2.32 K/UL — SIGNIFICANT CHANGE UP (ref 1.8–7.4)
NEUTROPHILS NFR BLD AUTO: 55.8 % — SIGNIFICANT CHANGE UP (ref 43–77)
PLATELET # BLD AUTO: 230 K/UL — SIGNIFICANT CHANGE UP (ref 150–400)
POTASSIUM SERPL-MCNC: 4.4 MMOL/L — SIGNIFICANT CHANGE UP (ref 3.5–5.3)
POTASSIUM SERPL-SCNC: 4.4 MMOL/L — SIGNIFICANT CHANGE UP (ref 3.5–5.3)
PROTHROM AB SERPL-ACNC: 9.2 SEC — LOW (ref 10–12.9)
RBC # BLD: 3.4 M/UL — LOW (ref 3.8–5.2)
RBC # FLD: 12.6 % — SIGNIFICANT CHANGE UP (ref 10.3–14.5)
SODIUM SERPL-SCNC: 142 MMOL/L — SIGNIFICANT CHANGE UP (ref 135–145)
WBC # BLD: 4.16 K/UL — SIGNIFICANT CHANGE UP (ref 3.8–10.5)
WBC # FLD AUTO: 4.16 K/UL — SIGNIFICANT CHANGE UP (ref 3.8–10.5)

## 2019-11-05 PROCEDURE — 93005 ELECTROCARDIOGRAM TRACING: CPT

## 2019-11-05 PROCEDURE — 93010 ELECTROCARDIOGRAM REPORT: CPT

## 2019-11-05 PROCEDURE — G0463: CPT

## 2019-11-05 NOTE — H&P PST ADULT - NSICDXPASTMEDICALHX_GEN_ALL_CORE_FT
PAST MEDICAL HISTORY:  Anxiety     Asthma     Bipolar 1 disorder no therapy    COPD (chronic obstructive pulmonary disease) emphysema    Depressed     Diverticulosis     Gall bladder disease     Hypertension     Kidney stone     Lipoma left abdomen / hip    Lipoma of abdominal wall left lateral

## 2019-11-05 NOTE — H&P PST ADULT - NSICDXFAMILYHX_GEN_ALL_CORE_FT
FAMILY HISTORY:  Father  Still living? No  Family history of colon cancer, Age at diagnosis: Age Unknown    Mother  Still living? Yes, Estimated age: 81-90  Family history of Alzheimer's disease, Age at diagnosis: Age Unknown  Family history of early CAD, Age at diagnosis: Age Unknown    Sibling  Still living? Yes, Estimated age: 61-70  Family history of bipolar disorder, Age at diagnosis: Age Unknown

## 2019-11-05 NOTE — H&P PST ADULT - NSICDXPASTSURGICALHX_GEN_ALL_CORE_FT
PAST SURGICAL HISTORY:  H/O colonoscopy with polypectomy 2017    H/O lumpectomy left 2018    H/O:  2000

## 2019-11-05 NOTE — H&P PST ADULT - NSICDXPROBLEM_GEN_ALL_CORE_FT
PROBLEM DIAGNOSES  Problem: COPD (chronic obstructive pulmonary disease)  Assessment and Plan: continue medication as directed  pulmonary clearance     Problem: Hypertension  Assessment and Plan: continue medication   routine labs and ekg   medical and cardiac clearance     Problem: Need for prophylactic measure  Assessment and Plan: high risk for VTE event, the surgical team will evaluate for appropriate VTE prophylaxis     Problem: Calculus of bile duct  Assessment and Plan:  laparoscopic cholecystectomy possible open cholecystectomy    Problem: Ventral hernia  Assessment and Plan: ventral hernia repair.

## 2019-11-05 NOTE — H&P PST ADULT - HISTORY OF PRESENT ILLNESS
58 year old female presents with c/o gallstones since 2016 , c/o bloating and abdominal discomfort . Pt c/o hernia to abdomen for several years , increased in size, occasional discomfort . Left abdominal /hip area Lipoma for years that has become larger . Pain at times.   patient has reported changes in bowels alternating  with constipation and diarrhea. She reports intermit RUE discomfort with certain foods.  She is now schedule for laparoscopic cholecystectomy possible open cholecystectomy, ventral hernia repair.

## 2019-11-05 NOTE — H&P PST ADULT - ASSESSMENT
58 year old female presents with c/o gallstones since 2016 , c/o bloating and abdominal discomfort . Pt c/o hernia to abdomen for several years , increased in size, occasional discomfort . Left abdominal /hip area Lipoma for years that has become larger . Pain at times.   patient has reported changes in bowels alternating  with constipation and diarrhea. She reports intermit RUE discomfort with certain foods.  She is now schedule for laparoscopic cholecystectomy possible open cholecystectomy, ventral hernia repair. 58 year old female presents with c/o gallstones since 2016 , c/o bloating and abdominal discomfort . Pt c/o hernia to abdomen for several years , increased in size, occasional discomfort . Left abdominal /hip area Lipoma for years that has become larger . Pain at times.   patient has reported changes in bowels alternating  with constipation and diarrhea. She reports intermit RUE discomfort with certain foods.  She is now schedule for laparoscopic cholecystectomy possible open cholecystectomy, ventral hernia repair.       CAPRINI VTE 2.0 SCORE [CLOT updated 2019]    AGE RELATED RISK FACTORS                                                       MOBILITY RELATED FACTORS  [ x] Age 41-60 years                                            (1 Point)                    [ ] Bed rest                                                        (1 Point)  [ ] Age: 61-74 years                                           (2 Points)                  [ ] Plaster cast                                                   (2 Points)  [ ] Age= 75 years                                              (3 Points)                    [ ] Bed bound for more than 72 hours                 (2 Points)    DISEASE RELATED RISK FACTORS                                               GENDER SPECIFIC FACTORS  [ ] Edema in the lower extremities                       (1 Point)              [ ] Pregnancy                                                     (1 Point)  [x ] Varicose veins                                               (1 Point)                     [ ] Post-partum < 6 weeks                                   (1 Point)             [x ] BMI > 25 Kg/m2                                            (1 Point)                     [ ] Hormonal therapy  or oral contraception          (1 Point)                 [ ] Sepsis (in the previous month)                        (1 Point)               [ ] History of pregnancy complications                 (1 point)  [ ] Pneumonia or serious lung disease                                               [ ] Unexplained or recurrent                     (1 Point)           (in the previous month)                               (1 Point)  [ ] Abnormal pulmonary function test                     (1 Point)                 SURGERY RELATED RISK FACTORS  [ ] Acute myocardial infarction                              (1 Point)               [ ]  Section                                             (1 Point)  [ ] Congestive heart failure (in the previous month)  (1 Point)      [ ] Minor surgery                                                  (1 Point)   [ ] Inflammatory bowel disease                             (1 Point)               [ ] Arthroscopic surgery                                        (2 Points)  [ ] Central venous access                                      (2 Points)                [x ] General surgery lasting more than 45 minutes (2 points)  [x ] Malignancy- Present or previous                   (2 Points)                [ ] Elective arthroplasty                                         (5 points)    [ ] Stroke (in the previous month)                          (5 Points)                                                                                                                                                           HEMATOLOGY RELATED FACTORS                                                 TRAUMA RELATED RISK FACTORS  [ ] Prior episodes of VTE                                     (3 Points)                [ ] Fracture of the hip, pelvis, or leg                       (5 Points)  [ ] Positive family history for VTE                         (3 Points)             [ ] Acute spinal cord injury (in the previous month)  (5 Points)  [ ] Prothrombin 54719 A                                     (3 Points)               [ ] Paralysis  (less than 1 month)                             (5 Points)  [ ] Factor V Leiden                                             (3 Points)                  [ ] Multiple Trauma within 1 month                        (5 Points)  [ ] Lupus anticoagulants                                     (3 Points)                                                           [ ] Anticardiolipin antibodies                               (3 Points)                                                       [ ] High homocysteine in the blood                      (3 Points)                                             [ ] Other congenital or acquired thrombophilia      (3 Points)                                                [ ] Heparin induced thrombocytopenia                  (3 Points)                                     Total Score [   7   ]

## 2019-11-06 RX ORDER — NEBULIZER AND COMPRESSOR
EACH MISCELLANEOUS
Refills: 0 | Status: ACTIVE | COMMUNITY
Start: 2017-11-09

## 2019-11-07 ENCOUNTER — NON-APPOINTMENT (OUTPATIENT)
Age: 59
End: 2019-11-07

## 2019-11-07 ENCOUNTER — APPOINTMENT (OUTPATIENT)
Dept: CARDIOLOGY | Facility: CLINIC | Age: 59
End: 2019-11-07
Payer: MEDICAID

## 2019-11-07 VITALS
WEIGHT: 189 LBS | BODY MASS INDEX: 35.68 KG/M2 | HEIGHT: 61 IN | OXYGEN SATURATION: 96 % | HEART RATE: 75 BPM | DIASTOLIC BLOOD PRESSURE: 62 MMHG | SYSTOLIC BLOOD PRESSURE: 99 MMHG

## 2019-11-07 VITALS — DIASTOLIC BLOOD PRESSURE: 60 MMHG | SYSTOLIC BLOOD PRESSURE: 104 MMHG

## 2019-11-07 PROCEDURE — 93000 ELECTROCARDIOGRAM COMPLETE: CPT

## 2019-11-07 PROCEDURE — 99214 OFFICE O/P EST MOD 30 MIN: CPT | Mod: 25

## 2019-11-08 ENCOUNTER — MEDICATION RENEWAL (OUTPATIENT)
Age: 59
End: 2019-11-08

## 2019-11-08 VITALS — DIASTOLIC BLOOD PRESSURE: 66 MMHG | SYSTOLIC BLOOD PRESSURE: 110 MMHG

## 2019-11-12 ENCOUNTER — APPOINTMENT (OUTPATIENT)
Dept: CARDIOLOGY | Facility: CLINIC | Age: 59
End: 2019-11-12
Payer: MEDICAID

## 2019-11-12 PROCEDURE — 0399T: CPT

## 2019-11-12 PROCEDURE — 93306 TTE W/DOPPLER COMPLETE: CPT

## 2019-11-12 RX ORDER — SODIUM CHLORIDE 9 MG/ML
3 INJECTION INTRAMUSCULAR; INTRAVENOUS; SUBCUTANEOUS ONCE
Refills: 0 | Status: DISCONTINUED | OUTPATIENT
Start: 2019-11-19 | End: 2019-12-17

## 2019-11-12 RX ORDER — CEFAZOLIN SODIUM 1 G
2000 VIAL (EA) INJECTION ONCE
Refills: 0 | Status: DISCONTINUED | OUTPATIENT
Start: 2019-11-19 | End: 2019-12-17

## 2019-11-13 ENCOUNTER — APPOINTMENT (OUTPATIENT)
Dept: INTERNAL MEDICINE | Facility: CLINIC | Age: 59
End: 2019-11-13
Payer: MEDICAID

## 2019-11-13 VITALS
OXYGEN SATURATION: 96 % | DIASTOLIC BLOOD PRESSURE: 80 MMHG | HEART RATE: 83 BPM | HEIGHT: 61 IN | SYSTOLIC BLOOD PRESSURE: 120 MMHG | WEIGHT: 193 LBS | BODY MASS INDEX: 36.44 KG/M2 | TEMPERATURE: 97.8 F

## 2019-11-13 DIAGNOSIS — K80.20 CALCULUS OF GALLBLADDER W/OUT CHOLECYSTITIS W/OUT OBSTRUCTION: ICD-10-CM

## 2019-11-13 PROCEDURE — 99215 OFFICE O/P EST HI 40 MIN: CPT

## 2019-11-14 ENCOUNTER — MEDICATION RENEWAL (OUTPATIENT)
Age: 59
End: 2019-11-14

## 2019-11-18 ENCOUNTER — TRANSCRIPTION ENCOUNTER (OUTPATIENT)
Age: 59
End: 2019-11-18

## 2019-11-18 RX ORDER — CHLORTHALIDONE 25 MG/1
25 TABLET ORAL DAILY
Qty: 30 | Refills: 5 | Status: DISCONTINUED | COMMUNITY
Start: 2019-04-17 | End: 2019-11-18

## 2019-11-19 ENCOUNTER — RESULT REVIEW (OUTPATIENT)
Age: 59
End: 2019-11-19

## 2019-11-19 ENCOUNTER — OUTPATIENT (OUTPATIENT)
Dept: INPATIENT UNIT | Facility: HOSPITAL | Age: 59
LOS: 1 days | End: 2019-11-19
Payer: COMMERCIAL

## 2019-11-19 VITALS
OXYGEN SATURATION: 94 % | DIASTOLIC BLOOD PRESSURE: 67 MMHG | HEART RATE: 84 BPM | RESPIRATION RATE: 18 BRPM | SYSTOLIC BLOOD PRESSURE: 138 MMHG

## 2019-11-19 VITALS
HEIGHT: 63 IN | DIASTOLIC BLOOD PRESSURE: 71 MMHG | RESPIRATION RATE: 18 BRPM | HEART RATE: 74 BPM | TEMPERATURE: 98 F | SYSTOLIC BLOOD PRESSURE: 119 MMHG | WEIGHT: 191.58 LBS | OXYGEN SATURATION: 97 %

## 2019-11-19 DIAGNOSIS — Z98.890 OTHER SPECIFIED POSTPROCEDURAL STATES: Chronic | ICD-10-CM

## 2019-11-19 DIAGNOSIS — Z98.891 HISTORY OF UTERINE SCAR FROM PREVIOUS SURGERY: Chronic | ICD-10-CM

## 2019-11-19 DIAGNOSIS — K80.50 CALCULUS OF BILE DUCT WITHOUT CHOLANGITIS OR CHOLECYSTITIS WITHOUT OBSTRUCTION: ICD-10-CM

## 2019-11-19 DIAGNOSIS — K43.9 VENTRAL HERNIA WITHOUT OBSTRUCTION OR GANGRENE: ICD-10-CM

## 2019-11-19 PROCEDURE — 88304 TISSUE EXAM BY PATHOLOGIST: CPT | Mod: 26

## 2019-11-19 PROCEDURE — 88304 TISSUE EXAM BY PATHOLOGIST: CPT

## 2019-11-19 PROCEDURE — 47562 LAPAROSCOPIC CHOLECYSTECTOMY: CPT

## 2019-11-19 RX ORDER — FENTANYL CITRATE 50 UG/ML
50 INJECTION INTRAVENOUS
Refills: 0 | Status: DISCONTINUED | OUTPATIENT
Start: 2019-11-19 | End: 2019-11-19

## 2019-11-19 RX ORDER — ONDANSETRON 8 MG/1
4 TABLET, FILM COATED ORAL ONCE
Refills: 0 | Status: DISCONTINUED | OUTPATIENT
Start: 2019-11-19 | End: 2019-11-19

## 2019-11-19 RX ORDER — SODIUM CHLORIDE 9 MG/ML
1000 INJECTION, SOLUTION INTRAVENOUS
Refills: 0 | Status: DISCONTINUED | OUTPATIENT
Start: 2019-11-19 | End: 2019-11-19

## 2019-11-19 RX ADMIN — FENTANYL CITRATE 50 MICROGRAM(S): 50 INJECTION INTRAVENOUS at 15:31

## 2019-11-19 NOTE — BRIEF OPERATIVE NOTE - NSICDXBRIEFPOSTOP_GEN_ALL_CORE_FT
POST-OP DIAGNOSIS:  Symptomatic cholelithiasis 19-Nov-2019 14:22:23  Christopher Faustin  Symptomatic cholelithiasis 19-Nov-2019 14:22:23  Christopher Faustin

## 2019-11-19 NOTE — ASU DISCHARGE PLAN (ADULT/PEDIATRIC) - CARE PROVIDER_API CALL
Finkelstein, Marc (MD)  Surgery  [address], [address]  [place] [address]  Phone: [phone]  Fax: [phone]  Follow Up Time: 1 week

## 2019-11-19 NOTE — BRIEF OPERATIVE NOTE - NSICDXBRIEFPREOP_GEN_ALL_CORE_FT
PRE-OP DIAGNOSIS:  Symptomatic cholelithiasis 19-Nov-2019 14:22:11  Christopher Faustin  Symptomatic cholelithiasis 19-Nov-2019 14:22:11  Christopher Faustin

## 2019-11-20 ENCOUNTER — TRANSCRIPTION ENCOUNTER (OUTPATIENT)
Age: 59
End: 2019-11-20

## 2019-11-26 ENCOUNTER — APPOINTMENT (OUTPATIENT)
Dept: INTERNAL MEDICINE | Facility: CLINIC | Age: 59
End: 2019-11-26
Payer: MEDICAID

## 2019-11-26 LAB — SURGICAL PATHOLOGY STUDY: SIGNIFICANT CHANGE UP

## 2019-11-26 PROCEDURE — G0008: CPT

## 2019-11-26 PROCEDURE — 90686 IIV4 VACC NO PRSV 0.5 ML IM: CPT

## 2019-12-03 ENCOUNTER — CHART COPY (OUTPATIENT)
Age: 59
End: 2019-12-03

## 2019-12-04 ENCOUNTER — MESSAGE (OUTPATIENT)
Age: 59
End: 2019-12-04

## 2019-12-04 ENCOUNTER — APPOINTMENT (OUTPATIENT)
Dept: INTERNAL MEDICINE | Facility: CLINIC | Age: 59
End: 2019-12-04

## 2019-12-06 ENCOUNTER — MEDICATION RENEWAL (OUTPATIENT)
Age: 59
End: 2019-12-06

## 2019-12-13 ENCOUNTER — APPOINTMENT (OUTPATIENT)
Dept: ORTHOPEDIC SURGERY | Facility: CLINIC | Age: 59
End: 2019-12-13
Payer: MEDICAID

## 2019-12-13 VITALS
HEIGHT: 61 IN | BODY MASS INDEX: 36.44 KG/M2 | DIASTOLIC BLOOD PRESSURE: 80 MMHG | HEART RATE: 81 BPM | SYSTOLIC BLOOD PRESSURE: 142 MMHG | WEIGHT: 193 LBS

## 2019-12-13 DIAGNOSIS — F17.218 NICOTINE DEPENDENCE, CIGARETTES, WITH OTHER NICOTINE-INDUCED DISORDERS: ICD-10-CM

## 2019-12-13 PROCEDURE — 99215 OFFICE O/P EST HI 40 MIN: CPT

## 2019-12-13 NOTE — DISCUSSION/SUMMARY
[de-identified] : At this point in time I would continue with her other medical procedures such as breast surgery and hernia repairs prior to any spine surgery.The pt has been referred to pain management for possible injection therapy. She may follow up here on a PRN basis for repeat clinical evaluation. \par  \par Based upon recent hx of breast CA/,s/p lumpectomy, radiation therapy patient is complex.Patient with multiple medical comorbidity increasing the risk of perioperative and postoperative complications as well as diminished spine outcomes as per the current medical literature. These include but are not limited to obesity, anxiety/depression, cardiac illness, kidney disease, peripheral vascular disease, history of cancer, COPD, dysmetabolic syndrome including but not limited to diabetes, hyperlipidemia, hypertension. Patient is being referred back to primary care provider for medical optimization, as well as other appropriate specialists as needed for optimization prior to spine surgery.

## 2019-12-13 NOTE — HISTORY OF PRESENT ILLNESS
[Incontinence] : incontinence [de-identified] : 59 year old female presents for MRI review and lumbar spine pain followup. Pt notes the back pain is relatively well controlled but she believes this may be because of on the medication she is on for her hernia surgery. Pt reports she broke coccyx bone when she was 12. She has 24 mg of Ibuprofen. Pain is described as constant ans stabbing. She is s/p 2 lumpectomies.Pt states she is scheduled for a hernia repair 11/19/19. Pt is also scheduled for a b/l mastectomy. Pt has not tried PT, chiropractic care or pain management at this time. Hx of Breast CA., COPD, Asthma, HLD, HTN, etc. Cinda questionnaire is negative.  [Loss of Dexterity] : good dexterity [Urinary Ret.] : no urinary retention [Ataxia] : no ataxia

## 2019-12-13 NOTE — REVIEW OF SYSTEMS
[Joint Pain] : joint pain [Fever] : no fever [Chills] : no chills [SOB on Exertion] : no shortness of breath on exertion [Cough] : no cough

## 2019-12-13 NOTE — ADDENDUM
[FreeTextEntry1] : Documented by Norma Winter acting as a scribe for Dr. Beau Carbone. 12/13/2019\par \par All medical record entries made by the Scribe were at my, Dr. Beau Carbone, direction and personally dictated by me on 12/13/2019. I have reviewed the chart and agree that the record accurately reflects my personal performance of the history, physical exam, assessment and plan. I have also personally directed, reviewed, and agreed with the chart.

## 2019-12-13 NOTE — PHYSICAL EXAM
[de-identified] : CONSTITUTIONAL: The patient is a very pleasant individual who is well-nourished and who appears stated age.\par PSYCHIATRIC: The patient is alert and oriented X 3 and in no apparent distress, and participates with orthopedic evaluation well.\par HEAD: Atraumatic and is nonsyndromic in appearance.\par EENT: No visible thyromegaly, EOMI.\par RESPIRATORY: Respiratory rate is regular, not dyspneic on examination.\par LYMPHATICS: There is no inguinal lymphadenopathy\par INTEGUMENTARY: Skin is clean, dry, and intact about the bilateral lower extremities and lumbar spine.\par VASCULAR: There is brisk capillary refill about the bilateral lower extremities.\par NEUROLOGIC: There are no pathologic reflexes. There is no decrease in sensation of the bilateral lower extremities on Wartenberg pinwheel/manual examination. Deep tendon reflexes are well maintained at 2+/4 of the bilateral lower extremities and are symmetric. Subjective neurogenic claudication.\par MUSCULOSKELETAL: There is no visible muscular atrophy. Manual motor strength is well maintained in the bilateral lower extremities. Range of motion of lumbar spine is well maintained. The patient ambulates in a non-myelopathic manner. Negative tension sign and straight leg raise bilaterally. Quad extension, ankle dorsiflexion, EHL, plantar flexion, and ankle eversion are well preserved. Normal secondary orthopaedic exam of bilateral hips, greater trochanteric area, knees and ankles. \par Mechanically oriented low back pain.  [de-identified] : MRI of the lumbar spine taken at [facility] on 11/02/19 and reviewed today demonstrates multilevel spondylosis of the lumbar spine, no focal marrow replacing lesions are identified to suggest osseous metastases. Lumbar DDD at L4-L5 and L5-S1.\par

## 2019-12-16 ENCOUNTER — MEDICATION RENEWAL (OUTPATIENT)
Age: 59
End: 2019-12-16

## 2019-12-18 ENCOUNTER — MEDICATION RENEWAL (OUTPATIENT)
Age: 59
End: 2019-12-18

## 2019-12-30 ENCOUNTER — APPOINTMENT (OUTPATIENT)
Dept: ULTRASOUND IMAGING | Facility: CLINIC | Age: 59
End: 2019-12-30

## 2019-12-31 ENCOUNTER — MOBILE ON CALL (OUTPATIENT)
Age: 59
End: 2019-12-31

## 2020-01-03 ENCOUNTER — APPOINTMENT (OUTPATIENT)
Dept: INTERNAL MEDICINE | Facility: CLINIC | Age: 60
End: 2020-01-03

## 2020-01-03 NOTE — H&P PST ADULT - MALE-SPECIFIC SYMPTOMS
· Monitoring: Meal Intake, Diet Tolerance, Weight, Pertinent Labs, Chewing/Swallowing, Nausea or Vomiting, Constipation, Monitor Bowel Function      Electronically signed by Allen Walker MS, RD, LD on 1/3/20 at 6:18 PM    Contact Number: [phone] not applicable

## 2020-01-04 ENCOUNTER — MEDICATION RENEWAL (OUTPATIENT)
Age: 60
End: 2020-01-04

## 2020-01-08 ENCOUNTER — FORM ENCOUNTER (OUTPATIENT)
Age: 60
End: 2020-01-08

## 2020-01-09 ENCOUNTER — OUTPATIENT (OUTPATIENT)
Dept: OUTPATIENT SERVICES | Facility: HOSPITAL | Age: 60
LOS: 1 days | End: 2020-01-09
Payer: COMMERCIAL

## 2020-01-09 ENCOUNTER — APPOINTMENT (OUTPATIENT)
Dept: ULTRASOUND IMAGING | Facility: CLINIC | Age: 60
End: 2020-01-09
Payer: MEDICAID

## 2020-01-09 DIAGNOSIS — Z98.890 OTHER SPECIFIED POSTPROCEDURAL STATES: Chronic | ICD-10-CM

## 2020-01-09 DIAGNOSIS — Z98.891 HISTORY OF UTERINE SCAR FROM PREVIOUS SURGERY: Chronic | ICD-10-CM

## 2020-01-09 DIAGNOSIS — N64.89 OTHER SPECIFIED DISORDERS OF BREAST: ICD-10-CM

## 2020-01-09 PROCEDURE — 76642 ULTRASOUND BREAST LIMITED: CPT | Mod: 26,RT

## 2020-01-09 PROCEDURE — 76642 ULTRASOUND BREAST LIMITED: CPT

## 2020-01-14 ENCOUNTER — APPOINTMENT (OUTPATIENT)
Dept: INTERNAL MEDICINE | Facility: CLINIC | Age: 60
End: 2020-01-14
Payer: MEDICAID

## 2020-01-14 VITALS
WEIGHT: 184 LBS | TEMPERATURE: 98 F | HEIGHT: 61 IN | HEART RATE: 83 BPM | SYSTOLIC BLOOD PRESSURE: 134 MMHG | OXYGEN SATURATION: 96 % | BODY MASS INDEX: 34.74 KG/M2 | DIASTOLIC BLOOD PRESSURE: 80 MMHG

## 2020-01-14 PROCEDURE — 99214 OFFICE O/P EST MOD 30 MIN: CPT

## 2020-01-16 ENCOUNTER — APPOINTMENT (OUTPATIENT)
Dept: PULMONOLOGY | Facility: CLINIC | Age: 60
End: 2020-01-16

## 2020-01-27 NOTE — H&P PST ADULT - NS HIV RISK FACTOR
Quality 110: Preventive Care And Screening: Influenza Immunization: Influenza Immunization not Administered for Documented Reasons. Detail Level: Detailed Additional Notes: no flu shot No

## 2020-02-01 PROCEDURE — H0002: CPT

## 2020-02-04 ENCOUNTER — APPOINTMENT (OUTPATIENT)
Dept: PULMONOLOGY | Facility: CLINIC | Age: 60
End: 2020-02-04

## 2020-02-04 ENCOUNTER — APPOINTMENT (OUTPATIENT)
Dept: INTERNAL MEDICINE | Facility: CLINIC | Age: 60
End: 2020-02-04

## 2020-02-10 ENCOUNTER — APPOINTMENT (OUTPATIENT)
Dept: SURGERY | Facility: CLINIC | Age: 60
End: 2020-02-10

## 2020-02-10 VITALS
TEMPERATURE: 97.5 F | HEART RATE: 85 BPM | WEIGHT: 182.03 LBS | OXYGEN SATURATION: 94 % | BODY MASS INDEX: 34.37 KG/M2 | HEIGHT: 61 IN | SYSTOLIC BLOOD PRESSURE: 109 MMHG | DIASTOLIC BLOOD PRESSURE: 69 MMHG

## 2020-02-10 NOTE — HISTORY OF PRESENT ILLNESS
[FreeTextEntry1] : Ms. Hill is a 58 year old female presenting today for initial evaluation for abnormal right breast imaging. She has a history of invasive lobular carcinoma, ER+, NE+, HER2- TNM Stage: [id number] of the left breast cancer and is s/p left breast lumpectomy in December, 2018. \par \par She has received radiation treatment with Dr. Roland Fuentes and has also seen Dr. Rick Easton who noted that due to her Oncotype DX Recurrence Score of 2/28/19 of 17.5% risk at 9 years, that chemotherapy benefit is at <1%. Therefore she completed radiation treatment and thereafter begin Anastrazole hormone therapy. She has developed severe lymphedema of the left breast and complains of moderate to severe pain. She states that she would like to see if she can undergo a mastectomy. I have counseled her on lymphedema of the breast and risks involved with surgery. \par \par She is scheduled to undergo abdominal hernia repair(s) with Dr. Finkelstein and I discussed the potential option of undergoing mastectomy with JESUS reconstruction, however Dr. Lao (Plastics) would need to weigh in on specific risks of surgery and integrity of skin. This may not be the opportune time to perform such a surgery.\par \par Final Pathology (12/14/18)\par 1. Left Breast Mass, Surgical Excision\par - Two lymph nodes, negative for metastatic disease (0/2).\par 2. Left Breast Mass, unoriented surgical excision, with needle localization wire:\par - S/P Previously excised invasive lobular carcinoma, 1.1 cm with a negative margin and breast receptor studies.\par 3. Left breast mass, unoriented surgical excision:\par - Two lymph nodes, negative for metastatic carcinoma (0/2).\par 4. Left breast old scar, surgical excision: \par - Skin with hypertrophic scar.\par \par Imaging:\par US Breast -Bilateral 6/24/19:\par RIGHT BREAST: No suspicious solid mass. \par - 9:00-10:00 position, 8cm from the nipple a newly noted 0.6 x 0.4 x 0.6 cm circumscribed hypoechoic mass;\par - 9:00 position, 6cm from the nipple there is a 0.5 cm cyst.\par \par LEFT BREAST: No suspicious solid mass. \par - Postoperative scarring demonstrated at the 1:00 axis, 14cm from the nipple and at the 3:00 axis, 13cm from the nipple.\par - 11:00, 11cm from the nipple, there is a 0.9 x 0.4 x 0.6cm hypoechoic mass, status post benign biopsy, decreased in size. \par - No focal or fluid collections or axillary adenopathy within the left axilla, area of pain to patient. \par \par IMPRESSION: \par - Benign appearing mass i the right breast at the 9:00-10:00 position. Recommendation is for a targeted right breast ultrasound in 6 months to ascertain stablility. No adenopathy or suspicious findings in the left axillae, area of pain. Recommendation is management on clinical grounds\par BIRADS 3.\par \par Mammogram 6/24/19:\par No mammographic evidence of malignancy in the left breast. Post treatment changes in the left breast. Patient declined right screening mammography at this visit. \par \par \par 7/22/19 - Mammogram - Right:\par RIGHT BREAST: No mammographic evidence of malignancy in the right breast.\par BIRADS - 1\par \par \par We reviewed the clinical examination, recent breast imaging and recommendations. She understands that recommendation is for repeat right breast ultrasound in 6 months and I agree with the recommendation. We will arrange consultation with Dr. Lao and follow up for further discussion.\par \par She understands and agrees to the plan.\par \par \par

## 2020-02-11 ENCOUNTER — OUTPATIENT (OUTPATIENT)
Dept: OUTPATIENT SERVICES | Facility: HOSPITAL | Age: 60
LOS: 1 days | End: 2020-02-11
Payer: COMMERCIAL

## 2020-02-11 VITALS
WEIGHT: 183.87 LBS | HEART RATE: 68 BPM | DIASTOLIC BLOOD PRESSURE: 72 MMHG | SYSTOLIC BLOOD PRESSURE: 110 MMHG | RESPIRATION RATE: 20 BRPM | HEIGHT: 64 IN | TEMPERATURE: 97 F

## 2020-02-11 DIAGNOSIS — Z90.49 ACQUIRED ABSENCE OF OTHER SPECIFIED PARTS OF DIGESTIVE TRACT: Chronic | ICD-10-CM

## 2020-02-11 DIAGNOSIS — Z13.89 ENCOUNTER FOR SCREENING FOR OTHER DISORDER: ICD-10-CM

## 2020-02-11 DIAGNOSIS — Z98.890 OTHER SPECIFIED POSTPROCEDURAL STATES: Chronic | ICD-10-CM

## 2020-02-11 DIAGNOSIS — Z98.891 HISTORY OF UTERINE SCAR FROM PREVIOUS SURGERY: Chronic | ICD-10-CM

## 2020-02-11 DIAGNOSIS — Z29.9 ENCOUNTER FOR PROPHYLACTIC MEASURES, UNSPECIFIED: ICD-10-CM

## 2020-02-11 DIAGNOSIS — K62.5 HEMORRHAGE OF ANUS AND RECTUM: ICD-10-CM

## 2020-02-11 DIAGNOSIS — Z01.818 ENCOUNTER FOR OTHER PREPROCEDURAL EXAMINATION: ICD-10-CM

## 2020-02-11 DIAGNOSIS — I10 ESSENTIAL (PRIMARY) HYPERTENSION: ICD-10-CM

## 2020-02-11 LAB
ANION GAP SERPL CALC-SCNC: 9 MMOL/L — SIGNIFICANT CHANGE UP (ref 5–17)
BUN SERPL-MCNC: 17 MG/DL — SIGNIFICANT CHANGE UP (ref 8–20)
CALCIUM SERPL-MCNC: 8.9 MG/DL — SIGNIFICANT CHANGE UP (ref 8.6–10.2)
CHLORIDE SERPL-SCNC: 101 MMOL/L — SIGNIFICANT CHANGE UP (ref 98–107)
CO2 SERPL-SCNC: 28 MMOL/L — SIGNIFICANT CHANGE UP (ref 22–29)
CREAT SERPL-MCNC: 1.2 MG/DL — SIGNIFICANT CHANGE UP (ref 0.5–1.3)
GLUCOSE SERPL-MCNC: 102 MG/DL — HIGH (ref 70–99)
HCT VFR BLD CALC: 37.1 % — SIGNIFICANT CHANGE UP (ref 34.5–45)
HGB BLD-MCNC: 11.8 G/DL — SIGNIFICANT CHANGE UP (ref 11.5–15.5)
MCHC RBC-ENTMCNC: 31.8 GM/DL — LOW (ref 32–36)
MCHC RBC-ENTMCNC: 32.6 PG — SIGNIFICANT CHANGE UP (ref 27–34)
MCV RBC AUTO: 102.5 FL — HIGH (ref 80–100)
PLATELET # BLD AUTO: 145 K/UL — LOW (ref 150–400)
POTASSIUM SERPL-MCNC: 4.2 MMOL/L — SIGNIFICANT CHANGE UP (ref 3.5–5.3)
POTASSIUM SERPL-SCNC: 4.2 MMOL/L — SIGNIFICANT CHANGE UP (ref 3.5–5.3)
RBC # BLD: 3.62 M/UL — LOW (ref 3.8–5.2)
RBC # FLD: 12.8 % — SIGNIFICANT CHANGE UP (ref 10.3–14.5)
SODIUM SERPL-SCNC: 138 MMOL/L — SIGNIFICANT CHANGE UP (ref 135–145)
WBC # BLD: 3.81 K/UL — SIGNIFICANT CHANGE UP (ref 3.8–10.5)
WBC # FLD AUTO: 3.81 K/UL — SIGNIFICANT CHANGE UP (ref 3.8–10.5)

## 2020-02-11 PROCEDURE — 93010 ELECTROCARDIOGRAM REPORT: CPT

## 2020-02-11 PROCEDURE — 80048 BASIC METABOLIC PNL TOTAL CA: CPT

## 2020-02-11 PROCEDURE — 85027 COMPLETE CBC AUTOMATED: CPT

## 2020-02-11 PROCEDURE — 93005 ELECTROCARDIOGRAM TRACING: CPT

## 2020-02-11 PROCEDURE — G0463: CPT

## 2020-02-11 PROCEDURE — 36415 COLL VENOUS BLD VENIPUNCTURE: CPT

## 2020-02-11 NOTE — H&P PST ADULT - NSICDXPASTMEDICALHX_GEN_ALL_CORE_FT
PAST MEDICAL HISTORY:  Anxiety     Asthma     Bipolar 1 disorder no therapy    COPD (chronic obstructive pulmonary disease) emphysema    Depressed     Diverticulosis     Gall bladder disease     Hypertension     Kidney stone     Lipoma left abdomen / hip    Lipoma of abdominal wall left lateral PAST MEDICAL HISTORY:  Anxiety     Asthma     Bipolar 1 disorder no therapy    Breast cancer     COPD (chronic obstructive pulmonary disease) emphysema    Depressed     Diverticulosis     Gall bladder disease     Hypertension     Kidney stone     Lipoma left abdomen / hip    Lipoma of abdominal wall left lateral    Risk factors for obstructive sleep apnea

## 2020-02-11 NOTE — H&P PST ADULT - HISTORY OF PRESENT ILLNESS
This is a 59 y.o female who presents to PST today. This is a 59 y.o female who presents to [facility] today.  The pt reports a family history of Colon CA and she has been experiencing a several year duration of rectal bleeding.  She has been undergoing Colonoscopies in the past, however, she reports several months ago she had a Cholecystectomy and ever since that time she has not had a "normal bowel movement."  She states her bowel movements are like "green, grey, black, cobwebs and never a solid"  She also reports a terrible odor.  She is now to undergo an additional Colonoscopy in the near future.

## 2020-02-11 NOTE — H&P PST ADULT - RS GEN PE MLT RESP DETAILS PC
diminished breath sounds, R/respirations non-labored/diminished breath sounds, L/airway patent/normal

## 2020-02-11 NOTE — H&P PST ADULT - GASTROINTESTINAL COMMENTS
Pt has palpable tender area noted to lower abdominal area below the navel.  Pt states has a hernia which is to be removed

## 2020-02-11 NOTE — H&P PST ADULT - NSICDXPROBLEM_GEN_ALL_CORE_FT
PROBLEM DIAGNOSES  Problem: Hemorrhage of rectum and anus  Assessment and Plan: Colonoscopy  Medical Clearance    Problem: Hypertension  Assessment and Plan: Follows with PMD    Problem: Need for prophylactic measure  Assessment and Plan:     Problem: Screening for substance abuse  Assessment and Plan: PROBLEM DIAGNOSES  Problem: Hemorrhage of rectum and anus  Assessment and Plan: Colonoscopy  Medical Clearance    Problem: Hypertension  Assessment and Plan: Follows with PMD    Problem: Need for prophylactic measure  Assessment and Plan: High risk.  Surgical team to evaluate need for pharmacologic VTE Prophylaxis    Problem: Screening for substance abuse  Assessment and Plan: Opioid screening tool score =1.  Low risk for potential abuse

## 2020-02-11 NOTE — H&P PST ADULT - NS PRO LAST MENSTRUAL DATE
Discharge instructions given. Patient verbalizes understanding and denies any questions. Scripts x 1 given. Patient discharged via wheelchair accompanied by son.        Jacklyn Blackman RN  12/13/17 [id number]
Years ago

## 2020-02-11 NOTE — H&P PST ADULT - ASSESSMENT
CAPRINI VTE 2.0 SCORE [CLOT updated 2019]    AGE RELATED RISK FACTORS                                                       MOBILITY RELATED FACTORS  [ ] Age 41-60 years                                            (1 Point)                    [ ] Bed rest                                                        (1 Point)  [ ] Age: 61-74 years                                           (2 Points)                  [ ] Plaster cast                                                   (2 Points)  [ ] Age= 75 years                                              (3 Points)                    [ ] Bed bound for more than 72 hours                 (2 Points)    DISEASE RELATED RISK FACTORS                                               GENDER SPECIFIC FACTORS  [ ] Edema in the lower extremities                       (1 Point)              [ ] Pregnancy                                                     (1 Point)  [ ] Varicose veins                                               (1 Point)                     [ ] Post-partum < 6 weeks                                   (1 Point)             [ ] BMI > 25 Kg/m2                                            (1 Point)                     [ ] Hormonal therapy  or oral contraception          (1 Point)                 [ ] Sepsis (in the previous month)                        (1 Point)               [ ] History of pregnancy complications                 (1 point)  [ ] Pneumonia or serious lung disease                                               [ ] Unexplained or recurrent                     (1 Point)           (in the previous month)                               (1 Point)  [ ] Abnormal pulmonary function test                     (1 Point)                 SURGERY RELATED RISK FACTORS  [ ] Acute myocardial infarction                              (1 Point)               [ ]  Section                                             (1 Point)  [ ] Congestive heart failure (in the previous month)  (1 Point)      [ ] Minor surgery                                                  (1 Point)   [ ] Inflammatory bowel disease                             (1 Point)               [ ] Arthroscopic surgery                                        (2 Points)  [ ] Central venous access                                      (2 Points)                [ ] General surgery lasting more than 45 minutes (2 points)  [ ] Malignancy- Present or previous                   (2 Points)                [ ] Elective arthroplasty                                         (5 points)    [ ] Stroke (in the previous month)                          (5 Points)                                                                                                                                                           HEMATOLOGY RELATED FACTORS                                                 TRAUMA RELATED RISK FACTORS  [ ] Prior episodes of VTE                                     (3 Points)                [ ] Fracture of the hip, pelvis, or leg                       (5 Points)  [ ] Positive family history for VTE                         (3 Points)             [ ] Acute spinal cord injury (in the previous month)  (5 Points)  [ ] Prothrombin 06075 A                                     (3 Points)               [ ] Paralysis  (less than 1 month)                             (5 Points)  [ ] Factor V Leiden                                             (3 Points)                  [ ] Multiple Trauma within 1 month                        (5 Points)  [ ] Lupus anticoagulants                                     (3 Points)                                                           [ ] Anticardiolipin antibodies                               (3 Points)                                                       [ ] High homocysteine in the blood                      (3 Points)                                             [ ] Other congenital or acquired thrombophilia      (3 Points)                                                [ ] Heparin induced thrombocytopenia                  (3 Points)                                     Total Score [          ]      OPIOID RISK TOOL    RENE EACH BOX THAT APPLIES AND ADD TOTALS AT THE END    FAMILY HISTORY OF SUBSTANCE ABUSE                 FEMALE         MALE                                                Alcohol                            [    ] 1 pt         [     ] 3pts                                               Illegal Drugs                    [    ] 2 pts       [     ] 3pts                                               Rx Drugs                          [      ]4 pts      [     ] 4 pts    PERSONAL HISTORY OF SUBSTANCE ABUSE                                                                                          Alcohol                            [     ] 3 pts       [     ] 3 pts                                               Illegal Drugs                    [     ] 4 pts       [     ] 4 pts                                               Rx Drugs                          [     ] 5 pts       [     ] 5 pts    AGE BETWEEN 16-45 YEARS                                     [     ] 1 pt         [     ] 1 pt    HISTORY OF PREADOLESCENT   SEXUAL ABUSE                                                            [     ] 3 pts        [     ] 0pts    PSYCHOLOGICAL DISEASE                     ADD, OCD, Bipolar, Schizophrenia        [     ] 2 pts        [     ] 2 pts                      Depression                                               [     ] 1 pt          [     ] 1 pt           SCORING TOTAL   (add numbers and type here)              (      )                                     A score of 3 or lower indicated LOW risk for future opioid abuse  A score of 4 to 7 indicated moderate risk for future opioid abuse  A score of 8 or higher indicates a high risk for opioid abuse CAPRINI VTE 2.0 SCORE [CLOT updated 2019]    AGE RELATED RISK FACTORS                                                       MOBILITY RELATED FACTORS  [x ] Age 41-60 years                                            (1 Point)                    [ ] Bed rest                                                        (1 Point)  [ ] Age: 61-74 years                                           (2 Points)                  [ ] Plaster cast                                                   (2 Points)  [ ] Age= 75 years                                              (3 Points)                    [ ] Bed bound for more than 72 hours                 (2 Points)    DISEASE RELATED RISK FACTORS                                               GENDER SPECIFIC FACTORS  [ ] Edema in the lower extremities                       (1 Point)              [ ] Pregnancy                                                     (1 Point)  [ ] Varicose veins                                               (1 Point)                     [ ] Post-partum < 6 weeks                                   (1 Point)             [x ] BMI > 25 Kg/m2                                            (1 Point)                     [ ] Hormonal therapy  or oral contraception          (1 Point)                 [ ] Sepsis (in the previous month)                        (1 Point)               [ ] History of pregnancy complications                 (1 point)  [ ] Pneumonia or serious lung disease                                               [ ] Unexplained or recurrent                     (1 Point)           (in the previous month)                               (1 Point)  [ ] Abnormal pulmonary function test                     (1 Point)                 SURGERY RELATED RISK FACTORS  [ ] Acute myocardial infarction                              (1 Point)               [ ]  Section                                             (1 Point)  [ ] Congestive heart failure (in the previous month)  (1 Point)      [ ] Minor surgery                                                  (1 Point)   [ ] Inflammatory bowel disease                             (1 Point)               [ ] Arthroscopic surgery                                        (2 Points)  [ ] Central venous access                                      (2 Points)                [x ] General surgery lasting more than 45 minutes (2 points)  [x] Malignancy- Present or previous                   (2 Points)                [ ] Elective arthroplasty                                         (5 points)    [ ] Stroke (in the previous month)                          (5 Points)                                                                                                                                                           HEMATOLOGY RELATED FACTORS                                                 TRAUMA RELATED RISK FACTORS  [ ] Prior episodes of VTE                                     (3 Points)                [ ] Fracture of the hip, pelvis, or leg                       (5 Points)  [x ] Positive family history for VTE                         (3 Points)             [ ] Acute spinal cord injury (in the previous month)  (5 Points)  [ ] Prothrombin 41024 A                                     (3 Points)               [ ] Paralysis  (less than 1 month)                             (5 Points)  [ ] Factor V Leiden                                             (3 Points)                  [ ] Multiple Trauma within 1 month                        (5 Points)  [ ] Lupus anticoagulants                                     (3 Points)                                                           [ ] Anticardiolipin antibodies                               (3 Points)                                                       [ ] High homocysteine in the blood                      (3 Points)                                             [ ] Other congenital or acquired thrombophilia      (3 Points)                                                [ ] Heparin induced thrombocytopenia                  (3 Points)                                     Total Score [    9      ]      OPIOID RISK TOOL    RENE EACH BOX THAT APPLIES AND ADD TOTALS AT THE END    FAMILY HISTORY OF SUBSTANCE ABUSE                 FEMALE         MALE                                                Alcohol                            [    ] 1 pt         [     ] 3pts                                               Illegal Drugs                    [    ] 2 pts       [     ] 3pts                                               Rx Drugs                          [      ]4 pts      [     ] 4 pts    PERSONAL HISTORY OF SUBSTANCE ABUSE                                                                                          Alcohol                            [     ] 3 pts       [     ] 3 pts                                               Illegal Drugs                    [     ] 4 pts       [     ] 4 pts                                               Rx Drugs                          [     ] 5 pts       [     ] 5 pts    AGE BETWEEN 16-45 YEARS                                     [     ] 1 pt         [     ] 1 pt    HISTORY OF PREADOLESCENT   SEXUAL ABUSE                                                            [     ] 3 pts        [     ] 0pts    PSYCHOLOGICAL DISEASE                     ADD, OCD, Bipolar, Schizophrenia        [     ] 2 pts        [     ] 2 pts                      Depression                                               [   x  ] 1 pt          [     ] 1 pt           SCORING TOTAL   (add numbers and type here)              (  1    )                                     A score of 3 or lower indicated LOW risk for future opioid abuse  A score of 4 to 7 indicated moderate risk for future opioid abuse  A score of 8 or higher indicates a high risk for opioid abuse

## 2020-02-11 NOTE — H&P PST ADULT - NSICDXPASTSURGICALHX_GEN_ALL_CORE_FT
PAST SURGICAL HISTORY:  H/O colonoscopy with polypectomy 2017    H/O lumpectomy left 2018    H/O:  2000 PAST SURGICAL HISTORY:  H/O colonoscopy with polypectomy 2017    H/O lumpectomy left 2018    H/O:  2000    S/P cholecystectomy

## 2020-02-12 ENCOUNTER — APPOINTMENT (OUTPATIENT)
Dept: INTERNAL MEDICINE | Facility: CLINIC | Age: 60
End: 2020-02-12
Payer: MEDICAID

## 2020-02-12 VITALS
HEIGHT: 61 IN | TEMPERATURE: 97.9 F | HEART RATE: 76 BPM | SYSTOLIC BLOOD PRESSURE: 116 MMHG | OXYGEN SATURATION: 97 % | DIASTOLIC BLOOD PRESSURE: 70 MMHG | BODY MASS INDEX: 34.74 KG/M2 | WEIGHT: 184 LBS

## 2020-02-12 PROCEDURE — 99215 OFFICE O/P EST HI 40 MIN: CPT

## 2020-02-17 ENCOUNTER — TRANSCRIPTION ENCOUNTER (OUTPATIENT)
Age: 60
End: 2020-02-17

## 2020-02-18 ENCOUNTER — OUTPATIENT (OUTPATIENT)
Dept: OUTPATIENT SERVICES | Facility: HOSPITAL | Age: 60
LOS: 1 days | End: 2020-02-18
Payer: COMMERCIAL

## 2020-02-18 DIAGNOSIS — Z98.891 HISTORY OF UTERINE SCAR FROM PREVIOUS SURGERY: Chronic | ICD-10-CM

## 2020-02-18 DIAGNOSIS — Z90.49 ACQUIRED ABSENCE OF OTHER SPECIFIED PARTS OF DIGESTIVE TRACT: Chronic | ICD-10-CM

## 2020-02-18 DIAGNOSIS — Z98.890 OTHER SPECIFIED POSTPROCEDURAL STATES: Chronic | ICD-10-CM

## 2020-02-18 DIAGNOSIS — K62.5 HEMORRHAGE OF ANUS AND RECTUM: ICD-10-CM

## 2020-02-18 PROCEDURE — G0105: CPT

## 2020-02-23 ENCOUNTER — FORM ENCOUNTER (OUTPATIENT)
Age: 60
End: 2020-02-23

## 2020-02-24 ENCOUNTER — APPOINTMENT (OUTPATIENT)
Dept: CT IMAGING | Facility: CLINIC | Age: 60
End: 2020-02-24
Payer: MEDICAID

## 2020-02-24 ENCOUNTER — OUTPATIENT (OUTPATIENT)
Dept: OUTPATIENT SERVICES | Facility: HOSPITAL | Age: 60
LOS: 1 days | End: 2020-02-24
Payer: COMMERCIAL

## 2020-02-24 VITALS — BODY MASS INDEX: 34.17 KG/M2 | WEIGHT: 181 LBS | HEIGHT: 61 IN

## 2020-02-24 DIAGNOSIS — Z98.891 HISTORY OF UTERINE SCAR FROM PREVIOUS SURGERY: Chronic | ICD-10-CM

## 2020-02-24 DIAGNOSIS — Z98.890 OTHER SPECIFIED POSTPROCEDURAL STATES: Chronic | ICD-10-CM

## 2020-02-24 DIAGNOSIS — Z90.49 ACQUIRED ABSENCE OF OTHER SPECIFIED PARTS OF DIGESTIVE TRACT: Chronic | ICD-10-CM

## 2020-02-24 DIAGNOSIS — Z01.811 ENCOUNTER FOR PREPROCEDURAL RESPIRATORY EXAMINATION: ICD-10-CM

## 2020-02-24 PROBLEM — C50.919 MALIGNANT NEOPLASM OF UNSPECIFIED SITE OF UNSPECIFIED FEMALE BREAST: Chronic | Status: ACTIVE | Noted: 2020-02-11

## 2020-02-24 PROBLEM — Z91.89 OTHER SPECIFIED PERSONAL RISK FACTORS, NOT ELSEWHERE CLASSIFIED: Chronic | Status: ACTIVE | Noted: 2020-02-11

## 2020-02-24 PROCEDURE — G0297: CPT | Mod: 26

## 2020-02-24 PROCEDURE — G0296 VISIT TO DETERM LDCT ELIG: CPT

## 2020-02-24 PROCEDURE — G0297: CPT

## 2020-02-24 NOTE — HISTORY OF PRESENT ILLNESS
[TextBox_13] : Referred by Dr. Melvin Hopson.\par \par Ms. LOPEZ is a 59 year old female with a history of HTN, high cholesterol, left breast cancer s/p lumpectomy and RT (2018), COPD and emphysema.\par \par She was seen in the office today for review of eligibility for, as well as, Low-Dose CT lung cancer screening.  Reviewed and confirmed that the patient meets screening eligibility criteria:\par \par 59 years old \par \par Smoking Status: Current Smoker\par \par Number of pack(s) per day: 1\par Number of years smoked: 30\par Number of pack years smokin\par \par Ms. LOPEZ denies any symptoms of lung cancer, including new cough, change in cough, hemoptysis, and unintentional weight loss.\par \par Ms. LOPEZ denies any personal history of lung cancer.  No lung cancer in a first degree relative.  Denies any history of occupational exposures.

## 2020-02-24 NOTE — DATA REVIEWED
[Lung Cancer Screening] : Patient underwent lung cancer screening [2] : 2 [TextBox_12] : 12/17 [TextBox_27] : 01/19

## 2020-02-24 NOTE — PLAN
[[place] [facility]] : referred to [place] [facility] [phone] [FreeTextEntry1] : - Low Dose CT chest for lung cancer screening.\par \par - Encouraged smoking cessation

## 2020-03-02 ENCOUNTER — APPOINTMENT (OUTPATIENT)
Dept: INTERNAL MEDICINE | Facility: CLINIC | Age: 60
End: 2020-03-02
Payer: MEDICAID

## 2020-03-02 VITALS
BODY MASS INDEX: 34.36 KG/M2 | SYSTOLIC BLOOD PRESSURE: 126 MMHG | OXYGEN SATURATION: 97 % | WEIGHT: 182 LBS | HEIGHT: 61 IN | HEART RATE: 112 BPM | DIASTOLIC BLOOD PRESSURE: 80 MMHG | TEMPERATURE: 98.1 F

## 2020-03-02 DIAGNOSIS — R32 UNSPECIFIED URINARY INCONTINENCE: ICD-10-CM

## 2020-03-02 DIAGNOSIS — F17.200 NICOTINE DEPENDENCE, UNSPECIFIED, UNCOMPLICATED: ICD-10-CM

## 2020-03-02 PROCEDURE — 99214 OFFICE O/P EST MOD 30 MIN: CPT

## 2020-03-03 PROBLEM — F17.200 NICOTINE ADDICTION: Status: ACTIVE | Noted: 2018-01-19

## 2020-03-13 ENCOUNTER — APPOINTMENT (OUTPATIENT)
Dept: INTERNAL MEDICINE | Facility: CLINIC | Age: 60
End: 2020-03-13
Payer: MEDICAID

## 2020-03-13 VITALS
BODY MASS INDEX: 34.2 KG/M2 | RESPIRATION RATE: 18 BRPM | DIASTOLIC BLOOD PRESSURE: 80 MMHG | SYSTOLIC BLOOD PRESSURE: 146 MMHG | OXYGEN SATURATION: 95 % | HEART RATE: 92 BPM | TEMPERATURE: 99.1 F | WEIGHT: 181 LBS

## 2020-03-13 DIAGNOSIS — R79.89 OTHER SPECIFIED ABNORMAL FINDINGS OF BLOOD CHEMISTRY: ICD-10-CM

## 2020-03-13 PROCEDURE — 36415 COLL VENOUS BLD VENIPUNCTURE: CPT

## 2020-03-13 PROCEDURE — 99214 OFFICE O/P EST MOD 30 MIN: CPT | Mod: 25

## 2020-03-14 LAB
ANION GAP SERPL CALC-SCNC: 13 MMOL/L
BASOPHILS # BLD AUTO: 0.02 K/UL
BASOPHILS NFR BLD AUTO: 0.4 %
BUN SERPL-MCNC: 12 MG/DL
CALCIUM SERPL-MCNC: 9.7 MG/DL
CHLORIDE SERPL-SCNC: 104 MMOL/L
CO2 SERPL-SCNC: 27 MMOL/L
CREAT SERPL-MCNC: 1.1 MG/DL
EOSINOPHIL # BLD AUTO: 0.12 K/UL
EOSINOPHIL NFR BLD AUTO: 2.7 %
GLUCOSE SERPL-MCNC: 127 MG/DL
HCT VFR BLD CALC: 38.5 %
HGB BLD-MCNC: 12.6 G/DL
IMM GRANULOCYTES NFR BLD AUTO: 0.2 %
LYMPHOCYTES # BLD AUTO: 1.19 K/UL
LYMPHOCYTES NFR BLD AUTO: 26.4 %
MAN DIFF?: NORMAL
MCHC RBC-ENTMCNC: 32.7 GM/DL
MCHC RBC-ENTMCNC: 33.5 PG
MCV RBC AUTO: 102.4 FL
MONOCYTES # BLD AUTO: 0.5 K/UL
MONOCYTES NFR BLD AUTO: 11.1 %
NEUTROPHILS # BLD AUTO: 2.66 K/UL
NEUTROPHILS NFR BLD AUTO: 59.2 %
PLATELET # BLD AUTO: 262 K/UL
POTASSIUM SERPL-SCNC: 4.7 MMOL/L
RBC # BLD: 3.76 M/UL
RBC # FLD: 13.1 %
SODIUM SERPL-SCNC: 144 MMOL/L
WBC # FLD AUTO: 4.5 K/UL

## 2020-03-20 ENCOUNTER — RX RENEWAL (OUTPATIENT)
Age: 60
End: 2020-03-20

## 2020-03-26 ENCOUNTER — APPOINTMENT (OUTPATIENT)
Dept: RADIATION ONCOLOGY | Facility: CLINIC | Age: 60
End: 2020-03-26

## 2020-03-28 NOTE — HISTORY OF PRESENT ILLNESS
[FreeTextEntry1] :  57 y/o F, active smoker (5-10cigarettes/day x10 years, quit intermittently throughout her life), former marijuana user, with PMHx left breast cancer (T1c) now with lymphedema of the left breast and options for mastectomy. She has been treated with radiation and anastrazole; after completion, she developed lymphedema of the left breast and severe pain. She does not have active cancer at this time. \par She received her treatment with Dr. Roland Fuentes and Dr. Rick Easton\par \par She would like to have procedure done after her ventral hernia repair.\par She is due to have her hernia repair with Dr. Finkelstein 11/19/2019\par Was previously following up with Dr. Watters.\par She has also followed up with Dr. Mittal.\par She is now following with Dr. Goldstein for possible bilateral mastectomies.\par \par Other PMHx includes COPD, HLD, HTN, anxiety, breast lumpectomy x2  of left breast ( most recent:12/2018), DVT of tibial vein, claudication to left lower extremity

## 2020-03-28 NOTE — HISTORY OF PRESENT ILLNESS
[FreeTextEntry1] :  59 y/o F, active smoker (5-10cigarettes/day x10 years, quit intermittently throughout her life), former marijuana user, with PMHx left breast cancer (T1c) now with lymphedema of the left breast and options for mastectomy. She has been treated with radiation and anastrazole; after completion, she developed lymphedema of the left breast and severe pain. She does not have active cancer at this time. \par She received her treatment with Dr. Roland Fuentes and Dr. Rick Easton\par \par She would like to have procedure done after her ventral hernia repair.\par She is due to have her hernia repair with Dr. Finkelstein 11/19/2019\par Was previously following up with Dr. Watters.\par She has also followed up with Dr. Mittal.\par She is now following with Dr. Goldstein for possible bilateral mastectomies.\par \par Other PMHx includes COPD, HLD, HTN, anxiety, breast lumpectomy x2  of left breast ( most recent:12/2018), DVT of tibial vein, claudication to left lower extremity

## 2020-03-28 NOTE — REASON FOR VISIT
[Consultation] : a consultation visit [Initial Evaluation] : an initial evaluation [FreeTextEntry1] : Breast reconstruction

## 2020-03-31 ENCOUNTER — APPOINTMENT (OUTPATIENT)
Dept: INTERNAL MEDICINE | Facility: CLINIC | Age: 60
End: 2020-03-31

## 2020-04-20 NOTE — REASON FOR VISIT
[Breast Cancer] : breast cancer [Routine Follow-Up] : routine follow-up visit for [Other: _____] : [unfilled]

## 2020-04-20 NOTE — VITALS
[Maximal Pain Intensity: 3/10] : 3/10 [OTC] : OTC [Least Pain Intensity: 1/10] : 1/10 [ECOG Performance Status: 1 - Restricted in physically strenuous activity but ambulatory and able to carry out work of a light or sedentary nature] : Performance Status: 1 - Restricted in physically strenuous activity but ambulatory and able to carry out work of a light or sedentary nature, e.g., light house work, office work [80: Normal activity with effort; some signs or symptoms of disease.] : 80: Normal activity with effort; some signs or symptoms of disease.

## 2020-04-21 ENCOUNTER — APPOINTMENT (OUTPATIENT)
Dept: SURGERY | Facility: CLINIC | Age: 60
End: 2020-04-21

## 2020-04-21 ENCOUNTER — APPOINTMENT (OUTPATIENT)
Dept: RADIATION ONCOLOGY | Facility: CLINIC | Age: 60
End: 2020-04-21
Payer: MEDICAID

## 2020-04-21 PROCEDURE — 99442: CPT

## 2020-04-22 NOTE — REVIEW OF SYSTEMS
[Negative] : Heme/Lymph [Joint Pain] : joint pain [Muscle Pain] : muscle pain [FreeTextEntry9] : chronic [de-identified] : tenderness  and swelling of left breast

## 2020-04-22 NOTE — LETTER GREETING
[Dear Doctor] : Dear Doctor, [Follow-Up] : Your patient, [unfilled] was seen in my office today for follow-up [Please see my note below.] : Please see my note below. [FreeTextEntry2] : Srikanth Peterson MD

## 2020-04-22 NOTE — DATA REVIEWED
[No studies available for review at this time.] : No studies available for review at this time. [FreeTextEntry1] : labs and US July and Aug 2019\par Breast US Jan 2020

## 2020-04-22 NOTE — DISEASE MANAGEMENT
[Pathological] : TNM Stage: p [IA] : IA [TTNM] : 1c [NTNM] : 0 [MTNM] : 0 [de-identified] : [id number] [de-identified] : left breast

## 2020-04-22 NOTE — HISTORY OF PRESENT ILLNESS
[Home] : at home, [unfilled] , at the time of the visit. [Medical Office: ([facility])___] : at the medical office located in  [Self] : self [Patient] : the patient [FreeTextEntry4] : Rosalinda Dupont, NP [FreeTextEntry1] : The patient presents for routine follow up. She is a 59 year old female with invasive lobular carcinoma, ER+, MT+, HER2- TNM Stage: [id number] of the left breast cancer and is s/p left breast lumpectomy in December, 2018.  She received 4240 cGy radiation therapy to the left breast completed on 4/18/2019.\par Started on Anastrazole, now taking Tamoxifen.\par \par She was evaluated by Dr Goldstein on 9/30/2019 for abnormal right breast presentation on US Breast -Bilateral 6/24/19:\par RIGHT BREAST: No suspicious solid mass. \par - 9:00-10:00 position, 8cm from the nipple a newly noted 0.6 x 0.4 x 0.6 cm circumscribed hypoechoic mass;\par - 9:00 position, 6cm from the nipple there is a 0.5 cm cyst.\par \par Breast U/S 1/9/2020 showed IMPRESSION: \par Stable mass in the right breast. Recommend continued follow-up with right breast ultrasound \par evaluation in 6 months, at time of annual bilateral mammography. \par RECOMMENDATION: Mammography and ultrasound in 6 months. \par BI-RADS 3-Probably Benign Finding(s)\par \par \par She notes lymphedema and tanning of her left treated breast\par Underwent  gallbladder surgery in Nov 2019 and hernia surgery still pending per   Dr. Finkelstein.\par She had colonoscopy on 2/18/2020 that was unremarkable.\par \par \par \par

## 2020-04-22 NOTE — LETTER CLOSING
[Sincerely yours,] : Sincerely yours, [FreeTextEntry3] : Roland Fuentes MD\par Physician in Chief\par Department of Radiation Medicine\par [facility] [place]\par [facility] [place]\par \par  of Radiation Medicine\par [facility]\par at  hospitals/[facility]\par \par Radiation \par [facility]/\par [facility] [place]\par [address]\par [place], [place] [address]\par \par Tel: [phone]\par Fax: [phone]\par

## 2020-05-22 ENCOUNTER — RX RENEWAL (OUTPATIENT)
Age: 60
End: 2020-05-22

## 2020-06-26 ENCOUNTER — APPOINTMENT (OUTPATIENT)
Dept: INTERNAL MEDICINE | Facility: CLINIC | Age: 60
End: 2020-06-26

## 2020-06-26 ENCOUNTER — APPOINTMENT (OUTPATIENT)
Dept: PULMONOLOGY | Facility: CLINIC | Age: 60
End: 2020-06-26
Payer: MEDICAID

## 2020-06-26 VITALS
WEIGHT: 175 LBS | SYSTOLIC BLOOD PRESSURE: 124 MMHG | HEIGHT: 61 IN | OXYGEN SATURATION: 98 % | HEART RATE: 71 BPM | DIASTOLIC BLOOD PRESSURE: 70 MMHG | BODY MASS INDEX: 33.04 KG/M2

## 2020-06-26 PROCEDURE — 99214 OFFICE O/P EST MOD 30 MIN: CPT

## 2020-06-26 NOTE — HISTORY OF PRESENT ILLNESS
[TextBox_4] : at baseline\par no fever, chill, chest pain\par using trelegy, recently ran out\par Cardiology Dr Hamlin\par post  hernia repair  Dr Finkelstein\par smoke free\par no sig dyspnea\par recent admission [facility] fro syncope 3/20\par positive ETOH, pt left AMA [FreeTextEntry1] : \par \par

## 2020-06-26 NOTE — PHYSICAL EXAM
[General Appearance - Well Developed] : well developed [Normal Appearance] : normal appearance [Normal Conjunctiva] : the conjunctiva exhibited no abnormalities [General Appearance - Well Nourished] : well nourished [Normal Oropharynx] : normal oropharynx [II] : II [Neck Appearance] : the appearance of the neck was normal [Heart Rate And Rhythm] : heart rate and rhythm were normal [Heart Sounds] : normal S1 and S2 [Murmurs] : no murmurs present [Respiration, Rhythm And Depth] : normal respiratory rhythm and effort [Exaggerated Use Of Accessory Muscles For Inspiration] : no accessory muscle use [Auscultation Breath Sounds / Voice Sounds] : lungs were clear to auscultation bilaterally [FreeTextEntry1] : no chest wall abn [Abdomen Soft] : soft [Abnormal Walk] : normal gait [Nail Clubbing] : no clubbing of the fingernails [Cyanosis, Localized] : no localized cyanosis [] : no rash [No Focal Deficits] : no focal deficits [Oriented To Time, Place, And Person] : oriented to person, place, and time [Impaired Insight] : insight and judgment were intact [Affect] : the affect was normal [Memory Recent] : recent memory was not impaired

## 2020-06-26 NOTE — PROCEDURE
[FreeTextEntry1] : CT chest 2/20  stable minute nodules \par Sleep study 9/18: no sig JAGDISH, nocturnal hypoxemia\par spirometry moderate air flow obstruction, improved FVC and FEV1 from 12/18

## 2020-06-26 NOTE — DISCUSSION/SUMMARY
[FreeTextEntry1] : COPD Gold class III, asthma component, smoke free\par  no fever, chill, chest pain,. dyspnea at baseline\par exam without bronchospasm, normal sp02\par minute nodules on low dose rad CT scan stable 2/20\par continued smoking cessation\par continue  trelegy daily ( ICS,LABA,LAMA) and samples given, to use duo neb tid as substitute\par spirometry improved, moderate air flow obstruction\par sleep study negative for JAGDISH,\par 4 months or sooner if needed\par \par

## 2020-06-26 NOTE — CONSULT LETTER
[Dear  ___] : Dear  [unfilled], [Consult Letter:] : I had the pleasure of evaluating your patient, [unfilled]. [Please see my note below.] : Please see my note below. [Consult Closing:] : Thank you very much for allowing me to participate in the care of this patient.  If you have any questions, please do not hesitate to contact me. [FreeTextEntry3] : Melvin Hopson DO [facility]\par Pulmonary Critical Care\par Director Pulmonary Division\par Medical Director Respiratory Therapy\par [facility]\par \par  [Sincerely,] : Sincerely, [DrRoby  ___] : Dr. HOUGH [DrRoby ___] : Dr. HOUGH [Melvin Hopson DO, [facility]] : Melvin Hopson DO, [facility] [Director, Respiratory Care] : Director, Respiratory Care [[facility]] : [facility]

## 2020-07-01 ENCOUNTER — RX RENEWAL (OUTPATIENT)
Age: 60
End: 2020-07-01

## 2020-07-07 ENCOUNTER — APPOINTMENT (OUTPATIENT)
Dept: MAMMOGRAPHY | Facility: CLINIC | Age: 60
End: 2020-07-07
Payer: MEDICAID

## 2020-07-07 ENCOUNTER — APPOINTMENT (OUTPATIENT)
Dept: ULTRASOUND IMAGING | Facility: CLINIC | Age: 60
End: 2020-07-07
Payer: MEDICAID

## 2020-07-07 ENCOUNTER — RX RENEWAL (OUTPATIENT)
Age: 60
End: 2020-07-07

## 2020-07-07 ENCOUNTER — OUTPATIENT (OUTPATIENT)
Dept: OUTPATIENT SERVICES | Facility: HOSPITAL | Age: 60
LOS: 1 days | End: 2020-07-07
Payer: COMMERCIAL

## 2020-07-07 DIAGNOSIS — Z90.49 ACQUIRED ABSENCE OF OTHER SPECIFIED PARTS OF DIGESTIVE TRACT: Chronic | ICD-10-CM

## 2020-07-07 DIAGNOSIS — Z98.890 OTHER SPECIFIED POSTPROCEDURAL STATES: Chronic | ICD-10-CM

## 2020-07-07 DIAGNOSIS — Z98.891 HISTORY OF UTERINE SCAR FROM PREVIOUS SURGERY: Chronic | ICD-10-CM

## 2020-07-07 DIAGNOSIS — Z85.3 PERSONAL HISTORY OF MALIGNANT NEOPLASM OF BREAST: ICD-10-CM

## 2020-07-07 PROCEDURE — 76641 ULTRASOUND BREAST COMPLETE: CPT | Mod: 26,50

## 2020-07-07 PROCEDURE — G0279: CPT | Mod: 26

## 2020-07-07 PROCEDURE — 76641 ULTRASOUND BREAST COMPLETE: CPT

## 2020-07-07 PROCEDURE — G0279: CPT

## 2020-07-07 PROCEDURE — 77066 DX MAMMO INCL CAD BI: CPT | Mod: 26

## 2020-07-07 PROCEDURE — 77066 DX MAMMO INCL CAD BI: CPT

## 2020-07-14 ENCOUNTER — APPOINTMENT (OUTPATIENT)
Dept: SURGERY | Facility: CLINIC | Age: 60
End: 2020-07-14

## 2020-07-14 ENCOUNTER — NON-APPOINTMENT (OUTPATIENT)
Age: 60
End: 2020-07-14

## 2020-07-14 ENCOUNTER — APPOINTMENT (OUTPATIENT)
Dept: INTERNAL MEDICINE | Facility: CLINIC | Age: 60
End: 2020-07-14
Payer: MEDICAID

## 2020-07-14 VITALS
BODY MASS INDEX: 33.23 KG/M2 | TEMPERATURE: 96.8 F | DIASTOLIC BLOOD PRESSURE: 70 MMHG | WEIGHT: 176 LBS | HEART RATE: 71 BPM | OXYGEN SATURATION: 97 % | HEIGHT: 61 IN | SYSTOLIC BLOOD PRESSURE: 124 MMHG

## 2020-07-14 DIAGNOSIS — Z11.59 ENCOUNTER FOR SCREENING FOR OTHER VIRAL DISEASES: ICD-10-CM

## 2020-07-14 PROCEDURE — 36415 COLL VENOUS BLD VENIPUNCTURE: CPT

## 2020-07-14 PROCEDURE — 92552 PURE TONE AUDIOMETRY AIR: CPT

## 2020-07-14 PROCEDURE — 82043 UR ALBUMIN QUANTITATIVE: CPT | Mod: QW

## 2020-07-14 PROCEDURE — 99396 PREV VISIT EST AGE 40-64: CPT | Mod: 25

## 2020-07-14 PROCEDURE — 93000 ELECTROCARDIOGRAM COMPLETE: CPT

## 2020-07-29 ENCOUNTER — RX RENEWAL (OUTPATIENT)
Age: 60
End: 2020-07-29

## 2020-07-31 ENCOUNTER — RX RENEWAL (OUTPATIENT)
Age: 60
End: 2020-07-31

## 2020-08-12 ENCOUNTER — APPOINTMENT (OUTPATIENT)
Dept: INTERNAL MEDICINE | Facility: CLINIC | Age: 60
End: 2020-08-12
Payer: MEDICAID

## 2020-08-12 VITALS
WEIGHT: 172 LBS | BODY MASS INDEX: 32.47 KG/M2 | TEMPERATURE: 98.1 F | HEIGHT: 61 IN | DIASTOLIC BLOOD PRESSURE: 68 MMHG | OXYGEN SATURATION: 96 % | SYSTOLIC BLOOD PRESSURE: 110 MMHG | HEART RATE: 74 BPM

## 2020-08-12 PROCEDURE — 99214 OFFICE O/P EST MOD 30 MIN: CPT

## 2020-08-20 ENCOUNTER — APPOINTMENT (OUTPATIENT)
Dept: SURGERY | Facility: CLINIC | Age: 60
End: 2020-08-20
Payer: MEDICAID

## 2020-08-20 VITALS
OXYGEN SATURATION: 100 % | HEART RATE: 72 BPM | TEMPERATURE: 97.8 F | WEIGHT: 170 LBS | SYSTOLIC BLOOD PRESSURE: 139 MMHG | DIASTOLIC BLOOD PRESSURE: 77 MMHG | HEIGHT: 64 IN | BODY MASS INDEX: 29.02 KG/M2

## 2020-08-20 PROCEDURE — 99214 OFFICE O/P EST MOD 30 MIN: CPT

## 2020-08-21 NOTE — PHYSICAL EXAM
[Atraumatic] : atraumatic [Normocephalic] : normocephalic [EOMI] : extra ocular movement intact [PERRL] : pupils equal, round and reactive to light [Supple] : supple [No Supraclavicular Adenopathy] : no supraclavicular adenopathy [Sclera nonicteric] : sclera nonicteric [Examined in the supine and seated position] : examined in the supine and seated position [No dominant masses] : no dominant masses left breast [No dominant masses] : no dominant masses in right breast  [No Nipple Discharge] : no left nipple discharge [No Nipple Retraction] : no right nipple retraction [No Axillary Lymphadenopathy] : no right axillary lymphadenopathy [No Edema] : no edema [No Ulceration] : no ulceration [No Rashes] : no rashes [de-identified] : No supraclavicular or axillary adenopathy. Large ptotic breast. No dominant masses, moderate induration and hyperpigmentation secondary to radiation. Everted nipple without discharge.   [de-identified] : No supraclavicular or axillary adenopathy. No dominant masses, normal to palpation. Everted nipple without discharge. No skin changes.

## 2020-08-21 NOTE — HISTORY OF PRESENT ILLNESS
[FreeTextEntry1] : Kemi is a jv 60 yo postmenopausal female who presents with treated left breast cancer with breast conservation by Dr. Charlie Farias (10/16/2018).\par \par Kemi underwent left breast needle localization lumpectomy on 10/16/2018.  Final pathology showed the tumor less than 1 mm from posterior margin.  She went back to the OR for reexcision of the margins on 12/14/2018 and final pathology showed ILC and the invasive tumor was 0.1 cm from the surgical resection.  0/2 lymph nodes were negative for carcinoma.  She completed radiation and was partially compliant with hormonal therapy.\par \par She was asked what her goals of surgery are: she states that she wants her breasts off and she doesn't want to the pain anymore.  She was informed that even with removal of both breasts, there could be pain related to radiation because of the risk of radiation changes to the chest wall.  She can also have pain after mastectomy.  She states that she understands but secondary to the chronic lymphedema of the left breast and the pain and heaviness, that she would like to undergo mastectomy without reconstruction but with oncoplastic skin closure.  We discussed the technical aspects of surgery at length.  She wants to undergo bilateral mastectomy and she understands risks v benefits in addition to the possibility of persisting pain.  We discussed the upcoming abdominal/hernia repair and she will follow up after surgery in January 2020 to discuss option for risk reduction bilateral mastectomy further. \par \par Recommendation is for follow up with Dr. Lao to discuss oncoplastic closure.  She will be undergoing hernia repair surgery on 9/25/2020.  Follow up at the end of October to review surgical planning.\par \par All questions were answered.

## 2020-08-21 NOTE — ASSESSMENT
[FreeTextEntry1] : 59 yo postmenopausal female with history of Stage 1 left breast cancer  T1cN0, treatment with lumpectomy, radiation and hormonal therapy (partially compliant). She will be undergoing surgery for a hernia repair on 9/25/2020.  She is to follow up with  in October to discuss oncoplastic closure and then return to the office at the end of October to discuss surgical planning.\par 1. Follow up with \par 2.  Follow up at the end of October\par

## 2020-08-25 ENCOUNTER — RX RENEWAL (OUTPATIENT)
Age: 60
End: 2020-08-25

## 2020-09-03 ENCOUNTER — APPOINTMENT (OUTPATIENT)
Dept: CARDIOLOGY | Facility: CLINIC | Age: 60
End: 2020-09-03

## 2020-09-08 ENCOUNTER — APPOINTMENT (OUTPATIENT)
Dept: CARDIOLOGY | Facility: CLINIC | Age: 60
End: 2020-09-08
Payer: MEDICAID

## 2020-09-08 ENCOUNTER — NON-APPOINTMENT (OUTPATIENT)
Age: 60
End: 2020-09-08

## 2020-09-08 VITALS
SYSTOLIC BLOOD PRESSURE: 125 MMHG | WEIGHT: 177 LBS | DIASTOLIC BLOOD PRESSURE: 74 MMHG | HEART RATE: 73 BPM | OXYGEN SATURATION: 95 % | BODY MASS INDEX: 30.38 KG/M2 | TEMPERATURE: 98 F

## 2020-09-08 DIAGNOSIS — E78.49 OTHER HYPERLIPIDEMIA: ICD-10-CM

## 2020-09-08 DIAGNOSIS — Z01.810 ENCOUNTER FOR PREPROCEDURAL CARDIOVASCULAR EXAMINATION: ICD-10-CM

## 2020-09-08 PROCEDURE — 93000 ELECTROCARDIOGRAM COMPLETE: CPT

## 2020-09-08 PROCEDURE — 99214 OFFICE O/P EST MOD 30 MIN: CPT

## 2020-09-08 RX ORDER — NICOTINE 21 MG/24HR
21 PATCH, TRANSDERMAL 24 HOURS TRANSDERMAL DAILY
Qty: 1 | Refills: 2 | Status: DISCONTINUED | COMMUNITY
Start: 2019-01-30 | End: 2020-09-08

## 2020-09-08 RX ORDER — CEFUROXIME AXETIL 500 MG/1
500 TABLET ORAL
Qty: 10 | Refills: 1 | Status: DISCONTINUED | COMMUNITY
Start: 2020-02-27 | End: 2020-09-08

## 2020-09-08 RX ORDER — IPRATROPIUM BROMIDE AND ALBUTEROL SULFATE 2.5; .5 MG/3ML; MG/3ML
0.5-2.5 (3) SOLUTION RESPIRATORY (INHALATION) 4 TIMES DAILY
Qty: 1 | Refills: 2 | Status: DISCONTINUED | COMMUNITY
Start: 2019-11-01 | End: 2020-09-08

## 2020-09-10 ENCOUNTER — APPOINTMENT (OUTPATIENT)
Dept: PULMONOLOGY | Facility: CLINIC | Age: 60
End: 2020-09-10

## 2020-09-10 ENCOUNTER — APPOINTMENT (OUTPATIENT)
Dept: DISASTER EMERGENCY | Facility: CLINIC | Age: 60
End: 2020-09-10

## 2020-09-11 ENCOUNTER — APPOINTMENT (OUTPATIENT)
Dept: INTERNAL MEDICINE | Facility: CLINIC | Age: 60
End: 2020-09-11

## 2020-09-11 LAB — SARS-COV-2 N GENE NPH QL NAA+PROBE: NOT DETECTED

## 2020-09-12 ENCOUNTER — LABORATORY RESULT (OUTPATIENT)
Age: 60
End: 2020-09-12

## 2020-09-12 ENCOUNTER — APPOINTMENT (OUTPATIENT)
Dept: INTERNAL MEDICINE | Facility: CLINIC | Age: 60
End: 2020-09-12

## 2020-09-12 DIAGNOSIS — Z79.899 OTHER LONG TERM (CURRENT) DRUG THERAPY: ICD-10-CM

## 2020-09-14 ENCOUNTER — RX RENEWAL (OUTPATIENT)
Age: 60
End: 2020-09-14

## 2020-09-14 ENCOUNTER — APPOINTMENT (OUTPATIENT)
Dept: PULMONOLOGY | Facility: CLINIC | Age: 60
End: 2020-09-14
Payer: MEDICAID

## 2020-09-14 ENCOUNTER — OUTPATIENT (OUTPATIENT)
Dept: OUTPATIENT SERVICES | Facility: HOSPITAL | Age: 60
LOS: 1 days | End: 2020-09-14
Payer: COMMERCIAL

## 2020-09-14 ENCOUNTER — FORM ENCOUNTER (OUTPATIENT)
Age: 60
End: 2020-09-14

## 2020-09-14 VITALS — BODY MASS INDEX: 31.65 KG/M2 | WEIGHT: 172 LBS | HEIGHT: 62 IN

## 2020-09-14 VITALS
HEIGHT: 64 IN | WEIGHT: 171.96 LBS | TEMPERATURE: 97 F | HEART RATE: 80 BPM | SYSTOLIC BLOOD PRESSURE: 134 MMHG | RESPIRATION RATE: 16 BRPM | DIASTOLIC BLOOD PRESSURE: 80 MMHG

## 2020-09-14 VITALS — DIASTOLIC BLOOD PRESSURE: 82 MMHG | HEART RATE: 82 BPM | SYSTOLIC BLOOD PRESSURE: 126 MMHG | OXYGEN SATURATION: 96 %

## 2020-09-14 DIAGNOSIS — Z01.811 ENCOUNTER FOR PREPROCEDURAL RESPIRATORY EXAMINATION: ICD-10-CM

## 2020-09-14 DIAGNOSIS — K43.9 VENTRAL HERNIA WITHOUT OBSTRUCTION OR GANGRENE: ICD-10-CM

## 2020-09-14 DIAGNOSIS — Z29.9 ENCOUNTER FOR PROPHYLACTIC MEASURES, UNSPECIFIED: ICD-10-CM

## 2020-09-14 DIAGNOSIS — Z01.818 ENCOUNTER FOR OTHER PREPROCEDURAL EXAMINATION: ICD-10-CM

## 2020-09-14 DIAGNOSIS — F31.9 BIPOLAR DISORDER, UNSPECIFIED: ICD-10-CM

## 2020-09-14 DIAGNOSIS — Z98.890 OTHER SPECIFIED POSTPROCEDURAL STATES: Chronic | ICD-10-CM

## 2020-09-14 DIAGNOSIS — Z98.891 HISTORY OF UTERINE SCAR FROM PREVIOUS SURGERY: Chronic | ICD-10-CM

## 2020-09-14 DIAGNOSIS — I10 ESSENTIAL (PRIMARY) HYPERTENSION: ICD-10-CM

## 2020-09-14 DIAGNOSIS — Z90.49 ACQUIRED ABSENCE OF OTHER SPECIFIED PARTS OF DIGESTIVE TRACT: Chronic | ICD-10-CM

## 2020-09-14 LAB
A1C WITH ESTIMATED AVERAGE GLUCOSE RESULT: 5.3 % — SIGNIFICANT CHANGE UP (ref 4–5.6)
ANION GAP SERPL CALC-SCNC: 9 MMOL/L — SIGNIFICANT CHANGE UP (ref 5–17)
ANISOCYTOSIS BLD QL: SLIGHT — SIGNIFICANT CHANGE UP
APTT BLD: 30.3 SEC — SIGNIFICANT CHANGE UP (ref 27.5–35.5)
BASOPHILS # BLD AUTO: 0 K/UL — SIGNIFICANT CHANGE UP (ref 0–0.2)
BASOPHILS NFR BLD AUTO: 0 % — SIGNIFICANT CHANGE UP (ref 0–2)
BUN SERPL-MCNC: 15 MG/DL — SIGNIFICANT CHANGE UP (ref 8–20)
CALCIUM SERPL-MCNC: 9.3 MG/DL — SIGNIFICANT CHANGE UP (ref 8.6–10.2)
CHLORIDE SERPL-SCNC: 107 MMOL/L — SIGNIFICANT CHANGE UP (ref 98–107)
CO2 SERPL-SCNC: 25 MMOL/L — SIGNIFICANT CHANGE UP (ref 22–29)
CREAT SERPL-MCNC: 1.07 MG/DL — SIGNIFICANT CHANGE UP (ref 0.5–1.3)
EOSINOPHIL # BLD AUTO: 0.1 K/UL — SIGNIFICANT CHANGE UP (ref 0–0.5)
EOSINOPHIL NFR BLD AUTO: 2.6 % — SIGNIFICANT CHANGE UP (ref 0–6)
ESTIMATED AVERAGE GLUCOSE: 105 MG/DL — SIGNIFICANT CHANGE UP (ref 68–114)
GIANT PLATELETS BLD QL SMEAR: PRESENT — SIGNIFICANT CHANGE UP
GLUCOSE SERPL-MCNC: 113 MG/DL — HIGH (ref 70–99)
HCT VFR BLD CALC: 34 % — LOW (ref 34.5–45)
HGB BLD-MCNC: 11.1 G/DL — LOW (ref 11.5–15.5)
INR BLD: 0.9 RATIO — SIGNIFICANT CHANGE UP (ref 0.88–1.16)
LYMPHOCYTES # BLD AUTO: 0.75 K/UL — LOW (ref 1–3.3)
LYMPHOCYTES # BLD AUTO: 19 % — SIGNIFICANT CHANGE UP (ref 13–44)
MACROCYTES BLD QL: SLIGHT — SIGNIFICANT CHANGE UP
MANUAL SMEAR VERIFICATION: SIGNIFICANT CHANGE UP
MCHC RBC-ENTMCNC: 32.6 GM/DL — SIGNIFICANT CHANGE UP (ref 32–36)
MCHC RBC-ENTMCNC: 35.2 PG — HIGH (ref 27–34)
MCV RBC AUTO: 107.9 FL — HIGH (ref 80–100)
MONOCYTES # BLD AUTO: 0.41 K/UL — SIGNIFICANT CHANGE UP (ref 0–0.9)
MONOCYTES NFR BLD AUTO: 10.3 % — SIGNIFICANT CHANGE UP (ref 2–14)
MRSA PCR RESULT.: SIGNIFICANT CHANGE UP
NEUTROPHILS # BLD AUTO: 2.7 K/UL — SIGNIFICANT CHANGE UP (ref 1.8–7.4)
NEUTROPHILS NFR BLD AUTO: 68.1 % — SIGNIFICANT CHANGE UP (ref 43–77)
PLAT MORPH BLD: NORMAL — SIGNIFICANT CHANGE UP
PLATELET # BLD AUTO: 312 K/UL — SIGNIFICANT CHANGE UP (ref 150–400)
POTASSIUM SERPL-MCNC: 4.1 MMOL/L — SIGNIFICANT CHANGE UP (ref 3.5–5.3)
POTASSIUM SERPL-SCNC: 4.1 MMOL/L — SIGNIFICANT CHANGE UP (ref 3.5–5.3)
PROTHROM AB SERPL-ACNC: 10.5 SEC — LOW (ref 10.6–13.6)
RBC # BLD: 3.15 M/UL — LOW (ref 3.8–5.2)
RBC # FLD: 14 % — SIGNIFICANT CHANGE UP (ref 10.3–14.5)
RBC BLD AUTO: ABNORMAL
S AUREUS DNA NOSE QL NAA+PROBE: DETECTED
SMUDGE CELLS # BLD: PRESENT — SIGNIFICANT CHANGE UP
SODIUM SERPL-SCNC: 141 MMOL/L — SIGNIFICANT CHANGE UP (ref 135–145)
STOMATOCYTES BLD QL SMEAR: SLIGHT — SIGNIFICANT CHANGE UP
WBC # BLD: 3.96 K/UL — SIGNIFICANT CHANGE UP (ref 3.8–10.5)
WBC # FLD AUTO: 3.96 K/UL — SIGNIFICANT CHANGE UP (ref 3.8–10.5)

## 2020-09-14 PROCEDURE — 94727 GAS DIL/WSHOT DETER LNG VOL: CPT

## 2020-09-14 PROCEDURE — 99214 OFFICE O/P EST MOD 30 MIN: CPT | Mod: 25

## 2020-09-14 PROCEDURE — G0463: CPT

## 2020-09-14 PROCEDURE — 93010 ELECTROCARDIOGRAM REPORT: CPT

## 2020-09-14 PROCEDURE — 94010 BREATHING CAPACITY TEST: CPT

## 2020-09-14 PROCEDURE — 85018 HEMOGLOBIN: CPT | Mod: QW

## 2020-09-14 PROCEDURE — 94729 DIFFUSING CAPACITY: CPT

## 2020-09-14 PROCEDURE — 93005 ELECTROCARDIOGRAM TRACING: CPT

## 2020-09-14 RX ORDER — CEFAZOLIN SODIUM 1 G
2000 VIAL (EA) INJECTION ONCE
Refills: 0 | Status: DISCONTINUED | OUTPATIENT
Start: 2020-09-25 | End: 2020-10-09

## 2020-09-14 RX ORDER — MUPIROCIN 20 MG/G
1 OINTMENT TOPICAL
Qty: 1 | Refills: 0
Start: 2020-09-14 | End: 2020-09-18

## 2020-09-14 NOTE — H&P PST ADULT - NSICDXPASTSURGICALHX_GEN_ALL_CORE_FT
PAST SURGICAL HISTORY:  H/O colonoscopy with polypectomy 2017    H/O lumpectomy left 2018 x 2    H/O:  2000    S/P cholecystectomy

## 2020-09-14 NOTE — DISCUSSION/SUMMARY
[FreeTextEntry1] : COPD Gold class III, asthma component, smoke free\par  no fever, chill, chest pain,. dyspnea at baseline\par exam without bronchospasm, normal sp02\par minute nodules on low dose rad CT scan stable 2/20, re-peat 2/21\par continued smoking cessation\par continue  trelegy daily ( ICS,LABA,LAMA) and samples given, to use duo neb tid as substitute\par spirometry stable, moderate air flow obstruction\par sleep study negative for JADGISH,\par 3 months or sooner if needed, Cardiology input noted\par No pulmonary contraindication to proposed procedure\par At increased risk of post operative pulmonary complications\par duo neb prior to induction and q 6hrs, budesonide 0.5 mg neb bid \par Restart Trelegy and change nebs to prn as stabilizes\par incentive spirometry and DVT prophylaxis\par monitored bed, continuous sp02\par \par

## 2020-09-14 NOTE — PHYSICAL EXAM
[General Appearance - Well Developed] : well developed [Normal Appearance] : normal appearance [General Appearance - Well Nourished] : well nourished [Normal Conjunctiva] : the conjunctiva exhibited no abnormalities [Normal Oropharynx] : normal oropharynx [II] : II [Neck Appearance] : the appearance of the neck was normal [Heart Rate And Rhythm] : heart rate and rhythm were normal [Heart Sounds] : normal S1 and S2 [Murmurs] : no murmurs present [Exaggerated Use Of Accessory Muscles For Inspiration] : no accessory muscle use [Respiration, Rhythm And Depth] : normal respiratory rhythm and effort [Auscultation Breath Sounds / Voice Sounds] : lungs were clear to auscultation bilaterally [Abdomen Soft] : soft [Abnormal Walk] : normal gait [Nail Clubbing] : no clubbing of the fingernails [Cyanosis, Localized] : no localized cyanosis [No Focal Deficits] : no focal deficits [] : no rash [Oriented To Time, Place, And Person] : oriented to person, place, and time [Impaired Insight] : insight and judgment were intact [Affect] : the affect was normal [Memory Recent] : recent memory was not impaired [FreeTextEntry1] : no chest wall abn

## 2020-09-14 NOTE — H&P PST ADULT - NSICDXPASTMEDICALHX_GEN_ALL_CORE_FT
PAST MEDICAL HISTORY:  Anxiety     Asthma     Bipolar 1 disorder no therapy    Breast cancer     COPD (chronic obstructive pulmonary disease) emphysema    Depressed     Diverticulosis     Gall bladder disease     Hypertension     Kidney stone     Lipoma left abdomen / hip    Lipoma of abdominal wall left lateral    Risk factors for obstructive sleep apnea

## 2020-09-14 NOTE — PROCEDURE
[FreeTextEntry1] : CT chest 2/20  stable minute nodules \par Sleep study 9/18: no sig JAGDISH, nocturnal hypoxemia\par spirometry moderate air flow obstruction,normal TLC, moderate decrease DLCO, no sig change

## 2020-09-14 NOTE — H&P PST ADULT - NSICDXPROBLEM_GEN_ALL_CORE_FT
PROBLEM DIAGNOSES  Problem: HTN (hypertension)  Assessment and Plan: routine labs and ekg  continue medication as directed  medical and cardiac clearance    Problem: Ventral hernia  Assessment and Plan:  incarcerated ventral hernia repair with possible mesh.      Problem: Bipolar 1 disorder  Assessment and Plan: continue medication as directed     Problem: Need for prophylactic measure  Assessment and Plan: moderate risk, the surgical team will order appropriate VTE prophylaxis

## 2020-09-14 NOTE — ASU PATIENT PROFILE, ADULT - VISION (WITH CORRECTIVE LENSES IF THE PATIENT USUALLY WEARS THEM):
glasses - bifocals/Normal vision: sees adequately in most situations; can see medication labels, newsprint

## 2020-09-14 NOTE — H&P PST ADULT - ASSESSMENT
CAPRINI VTE 2.0 SCORE [CLOT updated 2019]    AGE RELATED RISK FACTORS                                                       MOBILITY RELATED FACTORS  x[ ] Age 41-60 years                                            (1 Point)                    [ ] Bed rest                                                        (1 Point)  [ ] Age: 61-74 years                                           (2 Points)                  [ ] Plaster cast                                                   (2 Points)  [ ] Age= 75 years                                              (3 Points)                    [ ] Bed bound for more than 72 hours                 (2 Points)    DISEASE RELATED RISK FACTORS                                               GENDER SPECIFIC FACTORS  [ ] Edema in the lower extremities                       (1 Point)              [ ] Pregnancy                                                     (1 Point)  x[ ] Varicose veins                                               (1 Point)                     [ ] Post-partum < 6 weeks                                   (1 Point)             [ x] BMI > 25 Kg/m2                                            (1 Point)                     [ ] Hormonal therapy  or oral contraception          (1 Point)                 [ ] Sepsis (in the previous month)                        (1 Point)               [ ] History of pregnancy complications                 (1 point)  [ ] Pneumonia or serious lung disease                                               [ ] Unexplained or recurrent                     (1 Point)           (in the previous month)                               (1 Point)  [ ] Abnormal pulmonary function test                     (1 Point)                 SURGERY RELATED RISK FACTORS  [ ] Acute myocardial infarction                              (1 Point)               [ ]  Section                                             (1 Point)  [ ] Congestive heart failure (in the previous month)  (1 Point)      [ ] Minor surgery                                                  (1 Point)   [ ] Inflammatory bowel disease                             (1 Point)               [ ] Arthroscopic surgery                                        (2 Points)  [ ] Central venous access                                      (2 Points)                [x ] General surgery lasting more than 45 minutes (2 points)  [x ] Malignancy- Present or previous                   (2 Points)                [ ] Elective arthroplasty                                         (5 points)    [ ] Stroke (in the previous month)                          (5 Points)                                                                                                                                                           HEMATOLOGY RELATED FACTORS                                                 TRAUMA RELATED RISK FACTORS  [ ] Prior episodes of VTE                                     (3 Points)                [ ] Fracture of the hip, pelvis, or leg                       (5 Points)  [ ] Positive family history for VTE                         (3 Points)             [ ] Acute spinal cord injury (in the previous month)  (5 Points)  [ ] Prothrombin 14508 A                                     (3 Points)               [ ] Paralysis  (less than 1 month)                             (5 Points)  [ ] Factor V Leiden                                             (3 Points)                  [ ] Multiple Trauma within 1 month                        (5 Points)  [ ] Lupus anticoagulants                                     (3 Points)                                                           [ ] Anticardiolipin antibodies                               (3 Points)                                                       [ ] High homocysteine in the blood                      (3 Points)                                             [ ] Other congenital or acquired thrombophilia      (3 Points)                                                [ ] Heparin induced thrombocytopenia                  (3 Points)                                     Total Score [     7  ]    59 year old female with h/o bipolar, HTN, COPD and breast ca present to [facility] for incarcerated ventral hernia repair with possible mesh.

## 2020-09-14 NOTE — H&P PST ADULT - HISTORY OF PRESENT ILLNESS
59 year old female with h/o bipolar, HTN, COPD and breast ca present to [facility] for incarcerated ventral hernia repair with possible mesh.  The patient state she has been suffering with abdominal pain and GI issues such as constipation. she reports that work up revealed a hernia and is schedule for surgical repair.

## 2020-09-14 NOTE — CONSULT LETTER
[Dear  ___] : Dear  [unfilled], [Please see my note below.] : Please see my note below. [Consult Letter:] : I had the pleasure of evaluating your patient, [unfilled]. [Sincerely,] : Sincerely, [Consult Closing:] : Thank you very much for allowing me to participate in the care of this patient.  If you have any questions, please do not hesitate to contact me. [DrRoby ___] : Dr. HOUGH [DrRoby  ___] : Dr. HOUGH [Melvin Hopson DO, [facility]] : Melvin Hopson DO, [facility] [Director, Respiratory Care] : Director, Respiratory Care [[facility]] : [facility] [FreeTextEntry3] : Melvin Hopson DO [facility]\par Pulmonary Critical Care\par Director Pulmonary Division\par Medical Director Respiratory Therapy\par [facility]\par \par

## 2020-09-14 NOTE — H&P PST ADULT - SKIN/BREAST COMMENTS
Left breast pain, states it is on "fire." she state she is going to have mastectomy with doctor Goldstein in the future

## 2020-09-15 ENCOUNTER — APPOINTMENT (OUTPATIENT)
Dept: INTERNAL MEDICINE | Facility: CLINIC | Age: 60
End: 2020-09-15
Payer: MEDICAID

## 2020-09-15 ENCOUNTER — OUTPATIENT (OUTPATIENT)
Dept: OUTPATIENT SERVICES | Facility: HOSPITAL | Age: 60
LOS: 1 days | End: 2020-09-15
Payer: COMMERCIAL

## 2020-09-15 VITALS
WEIGHT: 172 LBS | OXYGEN SATURATION: 96 % | TEMPERATURE: 97.7 F | HEART RATE: 74 BPM | BODY MASS INDEX: 31.65 KG/M2 | HEIGHT: 62 IN | SYSTOLIC BLOOD PRESSURE: 130 MMHG | DIASTOLIC BLOOD PRESSURE: 94 MMHG

## 2020-09-15 DIAGNOSIS — Z92.3 PERSONAL HISTORY OF IRRADIATION: ICD-10-CM

## 2020-09-15 DIAGNOSIS — Z98.890 OTHER SPECIFIED POSTPROCEDURAL STATES: Chronic | ICD-10-CM

## 2020-09-15 DIAGNOSIS — Z90.49 ACQUIRED ABSENCE OF OTHER SPECIFIED PARTS OF DIGESTIVE TRACT: Chronic | ICD-10-CM

## 2020-09-15 DIAGNOSIS — R60.0 LOCALIZED EDEMA: ICD-10-CM

## 2020-09-15 DIAGNOSIS — Z98.891 HISTORY OF UTERINE SCAR FROM PREVIOUS SURGERY: Chronic | ICD-10-CM

## 2020-09-15 DIAGNOSIS — K43.9 VENTRAL HERNIA W/OUT OBSTRUCTION OR GANGRENE: ICD-10-CM

## 2020-09-15 LAB — NT-PROBNP SERPL-MCNC: 504 PG/ML

## 2020-09-15 PROCEDURE — 93306 TTE W/DOPPLER COMPLETE: CPT | Mod: 26

## 2020-09-15 PROCEDURE — 93306 TTE W/DOPPLER COMPLETE: CPT

## 2020-09-15 PROCEDURE — 99215 OFFICE O/P EST HI 40 MIN: CPT

## 2020-09-22 ENCOUNTER — APPOINTMENT (OUTPATIENT)
Dept: DISASTER EMERGENCY | Facility: CLINIC | Age: 60
End: 2020-09-22

## 2020-09-22 DIAGNOSIS — R93.1 ABNORMAL FINDINGS ON DIAGNOSTIC IMAGING OF HEART AND CORONARY CIRCULATION: ICD-10-CM

## 2020-09-22 LAB
AMPHET UR-MCNC: NEGATIVE
BARBITURATES UR-MCNC: NEGATIVE
BENZODIAZ UR-MCNC: NORMAL
COCAINE METAB.OTHER UR-MCNC: NORMAL
CREATININE, URINE: 92.8 MG/DL
METHADONE UR-MCNC: NEGATIVE
METHAQUALONE UR-MCNC: NEGATIVE
OPIATES UR-MCNC: NEGATIVE
PCP UR-MCNC: NEGATIVE
PROPOXYPH UR QL: NEGATIVE
THC UR QL: NORMAL

## 2020-09-23 LAB — SARS-COV-2 N GENE NPH QL NAA+PROBE: NOT DETECTED

## 2020-09-25 ENCOUNTER — RESULT REVIEW (OUTPATIENT)
Age: 60
End: 2020-09-25

## 2020-09-25 ENCOUNTER — OUTPATIENT (OUTPATIENT)
Dept: INPATIENT UNIT | Facility: HOSPITAL | Age: 60
LOS: 1 days | End: 2020-09-25
Payer: COMMERCIAL

## 2020-09-25 VITALS
TEMPERATURE: 97 F | WEIGHT: 171.96 LBS | SYSTOLIC BLOOD PRESSURE: 125 MMHG | DIASTOLIC BLOOD PRESSURE: 61 MMHG | RESPIRATION RATE: 16 BRPM | HEIGHT: 64 IN | OXYGEN SATURATION: 98 % | HEART RATE: 92 BPM

## 2020-09-25 VITALS
DIASTOLIC BLOOD PRESSURE: 67 MMHG | SYSTOLIC BLOOD PRESSURE: 146 MMHG | HEART RATE: 78 BPM | TEMPERATURE: 99 F | RESPIRATION RATE: 22 BRPM | OXYGEN SATURATION: 98 %

## 2020-09-25 DIAGNOSIS — Z98.891 HISTORY OF UTERINE SCAR FROM PREVIOUS SURGERY: Chronic | ICD-10-CM

## 2020-09-25 DIAGNOSIS — Z90.49 ACQUIRED ABSENCE OF OTHER SPECIFIED PARTS OF DIGESTIVE TRACT: Chronic | ICD-10-CM

## 2020-09-25 DIAGNOSIS — Z98.890 OTHER SPECIFIED POSTPROCEDURAL STATES: Chronic | ICD-10-CM

## 2020-09-25 DIAGNOSIS — K43.9 VENTRAL HERNIA WITHOUT OBSTRUCTION OR GANGRENE: ICD-10-CM

## 2020-09-25 LAB — BLD GP AB SCN SERPL QL: SIGNIFICANT CHANGE UP

## 2020-09-25 PROCEDURE — 88304 TISSUE EXAM BY PATHOLOGIST: CPT | Mod: 26

## 2020-09-25 NOTE — BRIEF OPERATIVE NOTE - OPERATION/FINDINGS
Open primary repair of incarcerated ventral hernia      Wound Class I    Procedure start-time: 13:40 end-time: 14:15    Incarcerated omentum.

## 2020-09-25 NOTE — ASU DISCHARGE PLAN (ADULT/PEDIATRIC) - CALL YOUR DOCTOR IF YOU HAVE ANY OF THE FOLLOWING:
Fever greater than (need to indicate Fahrenheit or Celsius)/Inability to tolerate liquids or foods/Bleeding that does not stop/Pain not relieved by Medications/Wound/Surgical Site with redness, or foul smelling discharge or pus/Unable to urinate/Nausea and vomiting that does not stop Inability to tolerate liquids or foods/Bleeding that does not stop/Pain not relieved by Medications/Nausea and vomiting that does not stop/Unable to urinate/Swelling that gets worse/Fever greater than (need to indicate Fahrenheit or Celsius)/Wound/Surgical Site with redness, or foul smelling discharge or pus

## 2020-09-25 NOTE — ASU DISCHARGE PLAN (ADULT/PEDIATRIC) - CARE PROVIDER_API CALL
FINKELSTEIN, ANDREW  [id number]  [address]  [place] [address]  Phone: [phone]  Fax: ([phone]  Established Patient  Follow Up Time: 2 weeks

## 2020-09-25 NOTE — ASU DISCHARGE PLAN (ADULT/PEDIATRIC) - ASU DC SPECIAL INSTRUCTIONSFT
- No heavy lifting/pushing/pulling more than 10 lbs for 4-6 weeks   - Remove dressing in 24-48 hours  - Can Shower in 24-48 hours  - No submerging in water for 4-6 weeks   - Follow up in clinic in 1-2 weeks

## 2020-09-25 NOTE — ASU DISCHARGE PLAN (ADULT/PEDIATRIC) - ACCOMPANIED BY
Reason For Visit  JOEL RODRIGUEZ is here today for a nurse visit for TB reading.      Allergies  No Known Drug Allergies    Assessment   1. Encounter for PPD skin test reading (V67.59) (Z11.1)    Plan    Patient Instructions Patient returned to clinic on 5/14 for PPD Results-Negative 0 mm.      Signatures   Electronically signed by : RIC LEIJA NP; May 14 2017  1:27PM CST     Family

## 2020-09-28 PROCEDURE — 86901 BLOOD TYPING SEROLOGIC RH(D): CPT

## 2020-09-28 PROCEDURE — 49561: CPT

## 2020-09-28 PROCEDURE — 88304 TISSUE EXAM BY PATHOLOGIST: CPT

## 2020-09-28 PROCEDURE — 36415 COLL VENOUS BLD VENIPUNCTURE: CPT

## 2020-09-28 PROCEDURE — 86850 RBC ANTIBODY SCREEN: CPT

## 2020-09-28 PROCEDURE — 86900 BLOOD TYPING SEROLOGIC ABO: CPT

## 2020-09-30 ENCOUNTER — TRANSCRIPTION ENCOUNTER (OUTPATIENT)
Age: 60
End: 2020-09-30

## 2020-09-30 LAB — SURGICAL PATHOLOGY STUDY: SIGNIFICANT CHANGE UP

## 2020-10-05 ENCOUNTER — LABORATORY RESULT (OUTPATIENT)
Age: 60
End: 2020-10-05

## 2020-10-05 ENCOUNTER — APPOINTMENT (OUTPATIENT)
Dept: INTERNAL MEDICINE | Facility: CLINIC | Age: 60
End: 2020-10-05
Payer: MEDICAID

## 2020-10-05 VITALS
OXYGEN SATURATION: 95 % | SYSTOLIC BLOOD PRESSURE: 126 MMHG | BODY MASS INDEX: 33.86 KG/M2 | HEART RATE: 85 BPM | HEIGHT: 62 IN | TEMPERATURE: 98.3 F | DIASTOLIC BLOOD PRESSURE: 60 MMHG | WEIGHT: 184 LBS

## 2020-10-05 PROCEDURE — 99214 OFFICE O/P EST MOD 30 MIN: CPT

## 2020-10-05 RX ORDER — VITAMIN K2 90 MCG
125 MCG CAPSULE ORAL
Qty: 30 | Refills: 0 | Status: COMPLETED | COMMUNITY
Start: 2020-09-29

## 2020-10-05 RX ORDER — MUPIROCIN 20 MG/G
2 OINTMENT TOPICAL
Qty: 22 | Refills: 0 | Status: COMPLETED | COMMUNITY
Start: 2020-09-14

## 2020-10-12 LAB
AMPHET UR-MCNC: NEGATIVE
BARBITURATES UR-MCNC: NEGATIVE
BENZODIAZ UR-MCNC: NORMAL
COCAINE METAB.OTHER UR-MCNC: NORMAL
CREATININE, URINE: 80.9 MG/DL
METHADONE UR-MCNC: NEGATIVE
METHAQUALONE UR-MCNC: NEGATIVE
OPIATES UR-MCNC: NEGATIVE
PCP UR-MCNC: NEGATIVE
PROPOXYPH UR QL: NEGATIVE
THC UR QL: NORMAL

## 2020-10-13 ENCOUNTER — APPOINTMENT (OUTPATIENT)
Dept: RADIATION ONCOLOGY | Facility: CLINIC | Age: 60
End: 2020-10-13
Payer: MEDICAID

## 2020-10-13 PROCEDURE — 99442: CPT

## 2020-10-14 NOTE — HISTORY OF PRESENT ILLNESS
[Home] : at home, [unfilled] , at the time of the visit. [Medical Office: ([facility])___] : at the medical office located in  [Verbal consent obtained from patient] : the patient, [unfilled] [FreeTextEntry1] : \par This 59 year-old woman is conferencing for routine follow up.  She is a 59 year old female with invasive lobular carcinoma, ER+, AR negative, HER2 negative , [id number] of the left breast cancer and is s/p left breast lumpectomy in December, 2018 with Dr. Farias.  She received 4240 cGy radiation therapy to the left breast completed on 4/18/2019.\par \par Started on Anastrazole, now taking Tamoxifen.  Per Dr. Goldstein she was partially compliant with hormonal therapy.\par \par At last visit, she noted chronic lymphedema and tanning of her left treated breast.\par \par Follows with Dr. Goldstein.  She is scheduled to follow with her the end of October for surgical planning.  She is interested in undergoing b/l mastectomy without reconstruction, with oncoplastic closure.  She was advised to follow-up with Dr. Lao.  \par \par Interval history is remarkable for Cholecystectomy per Dr. Marc Finkelstein.\par \par \par

## 2020-10-14 NOTE — DISEASE MANAGEMENT
[Pathological] : TNM Stage: p [IA] : IA [FreeTextEntry4] : invasive lobular carcinoma [TTNM] : 1c [NTNM] : 0 [MTNM] : 0 [de-identified] : [id number] [de-identified] : left breast

## 2020-10-14 NOTE — VITALS
[80: Normal activity with effort; some signs or symptoms of disease.] : 80: Normal activity with effort; some signs or symptoms of disease.  [ECOG Performance Status: 1 - Restricted in physically strenuous activity but ambulatory and able to carry out work of a light or sedentary nature] : Performance Status: 1 - Restricted in physically strenuous activity but ambulatory and able to carry out work of a light or sedentary nature, e.g., light house work, office work [Maximal Pain Intensity: 2/10] : 2/10 [Pain Description/Quality: ___] : Pain description/quality: [unfilled] [Pain Location: ___] : Pain Location: [unfilled] [OTC] : OTC

## 2020-10-14 NOTE — DATA REVIEWED
[No studies available for review at this time.] : No studies available for review at this time. [FreeTextEntry1] : labs and US July and Aug 2019\par Breast US and mammogram July 2020 dense breast otherwise unremarkable.

## 2020-10-14 NOTE — REVIEW OF SYSTEMS
[Joint Pain] : joint pain [Muscle Pain] : muscle pain [Negative] : Allergic/Immunologic [FreeTextEntry9] : chronic [de-identified] : tenderness  and swelling of left breast

## 2020-10-14 NOTE — LETTER CLOSING
[Sincerely yours,] : Sincerely yours, [FreeTextEntry3] : Roland Fuentes MD\par Physician in Chief\par Department of Radiation Medicine\par [facility] [place]\par [facility] [place]\par \par  of Radiation Medicine\par [facility]\par at  [facility]\par \par Radiation \par [facility]/\par [facility] [place]\par [address]\par [place], [place] [address]\par \par Tel: [phone]\par Fax: [phone]\par

## 2020-10-15 ENCOUNTER — APPOINTMENT (OUTPATIENT)
Dept: INTERNAL MEDICINE | Facility: CLINIC | Age: 60
End: 2020-10-15

## 2020-10-15 ENCOUNTER — LABORATORY RESULT (OUTPATIENT)
Age: 60
End: 2020-10-15

## 2020-10-23 LAB
AMPHET UR-MCNC: NEGATIVE
BARBITURATES UR-MCNC: NEGATIVE
BENZODIAZ UR-MCNC: NORMAL
COCAINE METAB.OTHER UR-MCNC: NEGATIVE
CREATININE, URINE: 61.1 MG/DL
METHADONE UR-MCNC: NEGATIVE
METHAQUALONE UR-MCNC: NEGATIVE
OPIATES UR-MCNC: NEGATIVE
PCP UR-MCNC: NEGATIVE
PROPOXYPH UR QL: NEGATIVE
THC UR QL: NORMAL

## 2020-10-24 ENCOUNTER — NON-APPOINTMENT (OUTPATIENT)
Age: 60
End: 2020-10-24

## 2020-10-30 ENCOUNTER — APPOINTMENT (OUTPATIENT)
Dept: INTERNAL MEDICINE | Facility: CLINIC | Age: 60
End: 2020-10-30
Payer: MEDICAID

## 2020-10-30 VITALS
BODY MASS INDEX: 33.49 KG/M2 | HEART RATE: 78 BPM | TEMPERATURE: 98.1 F | SYSTOLIC BLOOD PRESSURE: 112 MMHG | HEIGHT: 62 IN | WEIGHT: 182 LBS | DIASTOLIC BLOOD PRESSURE: 68 MMHG | OXYGEN SATURATION: 95 %

## 2020-10-30 DIAGNOSIS — R60.9 EDEMA, UNSPECIFIED: ICD-10-CM

## 2020-10-30 DIAGNOSIS — Z23 ENCOUNTER FOR IMMUNIZATION: ICD-10-CM

## 2020-10-30 PROCEDURE — 99072 ADDL SUPL MATRL&STAF TM PHE: CPT

## 2020-10-30 PROCEDURE — G0008: CPT

## 2020-10-30 PROCEDURE — 90686 IIV4 VACC NO PRSV 0.5 ML IM: CPT

## 2020-10-30 PROCEDURE — 99214 OFFICE O/P EST MOD 30 MIN: CPT | Mod: 25

## 2020-10-30 NOTE — PHYSICAL EXAM
[No Acute Distress] : no acute distress [Normal Sclera/Conjunctiva] : normal sclera/conjunctiva [Normal Outer Ear/Nose] : the outer ears and nose were normal in appearance [Normal Oropharynx] : the oropharynx was normal [No JVD] : no jugular venous distention [No Lymphadenopathy] : no lymphadenopathy [No Respiratory Distress] : no respiratory distress  [Normal Rate] : normal rate  [Regular Rhythm] : with a regular rhythm [Alert and Oriented x3] : oriented to person, place, and time

## 2020-10-30 NOTE — HISTORY OF PRESENT ILLNESS
[FreeTextEntry1] : f/u to peripheral edema, COPD,  Breast cancer [de-identified] : Pt peripheral edema is under good control. The COPD is controlled. The patient states that sheis putting off othe treatment of the breast cancer until next year because she is scared due to the pandemic. She has been advised the 2nd test also came back positive for cocaine and she swears that she will stop smoking totally.

## 2020-10-30 NOTE — ASSESSMENT
[FreeTextEntry1] : Pt is depressed at this time.  She has been advised of the positive cocaine in the drug screen. She states that she will not smoke any more pot. She is otherwise doing well at this time. She having the surgery for the breast and she needs to speak with the surgeon at this time. Have advised not to put it off too much. The COPD is controlled and she will continue the current treatment.

## 2020-10-30 NOTE — H&P PST ADULT - NSANTHAGERD_ENT_A_CORE
Jeff Walls called in inquiring if her nasal passage could collapse again. She heard something during the middle of the night.      Surgery 10/16/2020 Chronic Pansinusitis  Post op with Verner Cea 10/22/2020     Please advise Yes

## 2020-11-30 ENCOUNTER — APPOINTMENT (OUTPATIENT)
Dept: INTERNAL MEDICINE | Facility: CLINIC | Age: 60
End: 2020-11-30
Payer: MEDICAID

## 2020-11-30 VITALS
WEIGHT: 184 LBS | DIASTOLIC BLOOD PRESSURE: 82 MMHG | SYSTOLIC BLOOD PRESSURE: 140 MMHG | TEMPERATURE: 97.7 F | BODY MASS INDEX: 33.86 KG/M2 | HEIGHT: 62 IN | OXYGEN SATURATION: 94 % | HEART RATE: 109 BPM

## 2020-11-30 DIAGNOSIS — R10.9 UNSPECIFIED ABDOMINAL PAIN: ICD-10-CM

## 2020-11-30 PROCEDURE — 99214 OFFICE O/P EST MOD 30 MIN: CPT

## 2020-12-02 ENCOUNTER — NON-APPOINTMENT (OUTPATIENT)
Age: 60
End: 2020-12-02

## 2020-12-02 LAB — SARS-COV-2 N GENE NPH QL NAA+PROBE: NOT DETECTED

## 2020-12-05 ENCOUNTER — RX RENEWAL (OUTPATIENT)
Age: 60
End: 2020-12-05

## 2020-12-11 ENCOUNTER — APPOINTMENT (OUTPATIENT)
Dept: INTERNAL MEDICINE | Facility: CLINIC | Age: 60
End: 2020-12-11

## 2020-12-14 LAB — SARS-COV-2 N GENE NPH QL NAA+PROBE: NOT DETECTED

## 2020-12-16 PROBLEM — Z87.09 HISTORY OF LARYNGITIS: Status: RESOLVED | Noted: 2018-10-30 | Resolved: 2020-12-16

## 2020-12-21 ENCOUNTER — APPOINTMENT (OUTPATIENT)
Dept: INTERNAL MEDICINE | Facility: CLINIC | Age: 60
End: 2020-12-21
Payer: MEDICAID

## 2020-12-21 ENCOUNTER — APPOINTMENT (OUTPATIENT)
Dept: CARDIOLOGY | Facility: CLINIC | Age: 60
End: 2020-12-21
Payer: MEDICAID

## 2020-12-21 VITALS
DIASTOLIC BLOOD PRESSURE: 70 MMHG | HEIGHT: 62 IN | OXYGEN SATURATION: 98 % | WEIGHT: 184 LBS | SYSTOLIC BLOOD PRESSURE: 120 MMHG | BODY MASS INDEX: 33.86 KG/M2 | HEART RATE: 83 BPM | TEMPERATURE: 97.5 F

## 2020-12-21 DIAGNOSIS — I95.9 HYPOTENSION, UNSPECIFIED: ICD-10-CM

## 2020-12-21 PROCEDURE — 99072 ADDL SUPL MATRL&STAF TM PHE: CPT

## 2020-12-21 PROCEDURE — 99214 OFFICE O/P EST MOD 30 MIN: CPT

## 2020-12-21 PROCEDURE — 93356 MYOCRD STRAIN IMG SPCKL TRCK: CPT

## 2020-12-21 PROCEDURE — 93306 TTE W/DOPPLER COMPLETE: CPT

## 2020-12-21 PROCEDURE — 76376 3D RENDER W/INTRP POSTPROCES: CPT

## 2021-01-06 DIAGNOSIS — Z78.0 ASYMPTOMATIC MENOPAUSAL STATE: ICD-10-CM

## 2021-01-12 ENCOUNTER — NON-APPOINTMENT (OUTPATIENT)
Age: 61
End: 2021-01-12

## 2021-01-12 ENCOUNTER — APPOINTMENT (OUTPATIENT)
Dept: CARDIOLOGY | Facility: CLINIC | Age: 61
End: 2021-01-12
Payer: MEDICAID

## 2021-01-12 VITALS
DIASTOLIC BLOOD PRESSURE: 78 MMHG | WEIGHT: 185 LBS | BODY MASS INDEX: 34.04 KG/M2 | RESPIRATION RATE: 18 BRPM | SYSTOLIC BLOOD PRESSURE: 122 MMHG | HEART RATE: 89 BPM | HEIGHT: 62 IN | OXYGEN SATURATION: 97 % | TEMPERATURE: 98.6 F

## 2021-01-12 DIAGNOSIS — M79.669 PAIN IN UNSPECIFIED LOWER LEG: ICD-10-CM

## 2021-01-12 PROCEDURE — 99072 ADDL SUPL MATRL&STAF TM PHE: CPT

## 2021-01-12 PROCEDURE — 99214 OFFICE O/P EST MOD 30 MIN: CPT

## 2021-01-12 PROCEDURE — 93000 ELECTROCARDIOGRAM COMPLETE: CPT

## 2021-01-14 ENCOUNTER — APPOINTMENT (OUTPATIENT)
Dept: CARDIOLOGY | Facility: CLINIC | Age: 61
End: 2021-01-14
Payer: MEDICAID

## 2021-01-14 PROCEDURE — 93970 EXTREMITY STUDY: CPT

## 2021-01-14 PROCEDURE — 99072 ADDL SUPL MATRL&STAF TM PHE: CPT

## 2021-01-19 ENCOUNTER — APPOINTMENT (OUTPATIENT)
Dept: PLASTIC SURGERY | Facility: CLINIC | Age: 61
End: 2021-01-19
Payer: MEDICAID

## 2021-01-19 ENCOUNTER — APPOINTMENT (OUTPATIENT)
Dept: SURGERY | Facility: CLINIC | Age: 61
End: 2021-01-19

## 2021-01-19 VITALS
TEMPERATURE: 97.5 F | WEIGHT: 184 LBS | HEIGHT: 62 IN | HEART RATE: 88 BPM | SYSTOLIC BLOOD PRESSURE: 122 MMHG | BODY MASS INDEX: 33.86 KG/M2 | DIASTOLIC BLOOD PRESSURE: 73 MMHG | OXYGEN SATURATION: 96 %

## 2021-01-19 DIAGNOSIS — N65.1 DEFORMITY OF RECONSTRUCTED BREAST: ICD-10-CM

## 2021-01-19 DIAGNOSIS — N65.0 DEFORMITY OF RECONSTRUCTED BREAST: ICD-10-CM

## 2021-01-19 PROCEDURE — 99072 ADDL SUPL MATRL&STAF TM PHE: CPT

## 2021-01-19 PROCEDURE — 99214 OFFICE O/P EST MOD 30 MIN: CPT

## 2021-01-26 ENCOUNTER — APPOINTMENT (OUTPATIENT)
Dept: INTERNAL MEDICINE | Facility: CLINIC | Age: 61
End: 2021-01-26
Payer: MEDICAID

## 2021-01-26 VITALS
OXYGEN SATURATION: 96 % | BODY MASS INDEX: 34.6 KG/M2 | HEIGHT: 62 IN | HEART RATE: 82 BPM | DIASTOLIC BLOOD PRESSURE: 80 MMHG | WEIGHT: 188 LBS | SYSTOLIC BLOOD PRESSURE: 122 MMHG

## 2021-01-26 PROCEDURE — 99072 ADDL SUPL MATRL&STAF TM PHE: CPT

## 2021-01-26 PROCEDURE — 99214 OFFICE O/P EST MOD 30 MIN: CPT

## 2021-01-28 PROBLEM — N65.0 DEFORMITY AND DISPROPORTION OF RECONSTRUCTED BREAST: Status: ACTIVE | Noted: 2021-01-28

## 2021-01-28 NOTE — ASSESSMENT
[FreeTextEntry1] : 59 yo female with history of left breast cancer which was treated with lumpectomy and radiation in 2018 who was sent today by Dr Goldstein to discuss her reconstructive options after b/l mastectomy \par The patient was counseled about reconstructive options following mastectomy, including autologus, prosthetic, and the options of foregoing reconstruction.  Additionally, she was explained the options regarding the timing of reconstruction, including immediate reconstruction at the time of mastectomy or delayed reconstruction at a later date. \par \par The patient was extensively counseled on the operation and the perioperative recoveries of both autologous and prosthetic reconstructions.  The patient understands the risks with abdominally based microsurgical reconstruction including partial or total flap loss, revisit to the operating room in the niurka-operative period, wound dehiscence, mastectomy skin flap necrosis, fat necrosis, abdominal wall morbidity (laxity, bulge, hernia), asymmetry, parasthesia, seroma, hematoma, and infection.  She also understands the risks with implant-based reconstruction including infection, implant malposition, extrusion, deflation, capsular contracture, mastectomy skin flap necrosis, wound dehiscence, asymmetry, seroma, parasthesia, and hematoma. \par \par The patient understands all of these risks and she provides informed consent.\par \par Plan is for oncoplastic closure after mastectomy

## 2021-01-28 NOTE — HISTORY OF PRESENT ILLNESS
[FreeTextEntry1] : Ms. DEX LOPEZ  is a 60 year old female with past medical history of COPD, HLD, HTN, anxiety, DVT,left breast cancer which was treated in 2018 with lumpectomy and radiation therapy and anastrazole. Pt states since her treatment she developed lymphedema of the left breast and has severe pain. Pt was sent to the office by Dr Goldstein because she is thinking about undergoing b/l mastectomy and is here to discuss her reconstructive options\par pt was seen by myself in 10/2919 to discuss the same surgery\par \par \par smoking status: current smoker 5 cig/day\par anticoagulation: none\par history of bleeding disorder: none\par family history of breast cancer: none\par

## 2021-02-02 ENCOUNTER — APPOINTMENT (OUTPATIENT)
Dept: ORTHOPEDIC SURGERY | Facility: CLINIC | Age: 61
End: 2021-02-02

## 2021-02-03 ENCOUNTER — APPOINTMENT (OUTPATIENT)
Dept: INTERNAL MEDICINE | Facility: CLINIC | Age: 61
End: 2021-02-03

## 2021-02-03 DIAGNOSIS — Z20.822 CONTACT WITH AND (SUSPECTED) EXPOSURE TO COVID-19: ICD-10-CM

## 2021-02-05 ENCOUNTER — NON-APPOINTMENT (OUTPATIENT)
Age: 61
End: 2021-02-05

## 2021-02-05 LAB — SARS-COV-2 N GENE NPH QL NAA+PROBE: NOT DETECTED

## 2021-02-16 ENCOUNTER — APPOINTMENT (OUTPATIENT)
Dept: PLASTIC SURGERY | Facility: CLINIC | Age: 61
End: 2021-02-16

## 2021-02-23 ENCOUNTER — APPOINTMENT (OUTPATIENT)
Dept: ORTHOPEDIC SURGERY | Facility: CLINIC | Age: 61
End: 2021-02-23
Payer: MEDICAID

## 2021-02-23 VITALS — WEIGHT: 195 LBS | BODY MASS INDEX: 35.88 KG/M2 | HEIGHT: 62 IN

## 2021-02-23 DIAGNOSIS — I73.9 PERIPHERAL VASCULAR DISEASE, UNSPECIFIED: ICD-10-CM

## 2021-02-23 DIAGNOSIS — I82.441 ACUTE EMBOLISM AND THROMBOSIS OF RIGHT TIBIAL VEIN: ICD-10-CM

## 2021-02-23 PROCEDURE — 99215 OFFICE O/P EST HI 40 MIN: CPT

## 2021-02-23 PROCEDURE — 99072 ADDL SUPL MATRL&STAF TM PHE: CPT

## 2021-02-23 PROCEDURE — 72100 X-RAY EXAM L-S SPINE 2/3 VWS: CPT

## 2021-02-23 NOTE — HISTORY OF PRESENT ILLNESS
[de-identified] : 60 year old F Presents for follow up evaluation of persistent involuntary rocking and lower back pain. Patient states she can not stop rocking back and fourth for the last three months. Pain is worse with walking and standing but is relieved with sitting. She did not go to pain management which was recommend at last visit. Patient is pending BL mastectomy surgery, she has completed her hernia surgery discussed at last visit. Hx of Breast CA., COPD, Asthma, HLD, HTN, etc. FLORA questionnaire is negative.  [Ataxia] : no ataxia [Incontinence] : no incontinence [Loss of Dexterity] : good dexterity [Urinary Ret.] : no urinary retention

## 2021-02-23 NOTE — PHYSICAL EXAM
[Obese] : obese [Poor Appearance] : well-appearing [Acute Distress] : not in acute distress [de-identified] : CONSTITUTIONAL: The patient is a very pleasant individual who is well-nourished and who appears stated age.\par PSYCHIATRIC: The patient is alert and oriented X 3 and in no apparent distress, and participates with orthopedic evaluation well.\par HEAD: Atraumatic and is nonsyndromic in appearance.\par EENT: No visible thyromegaly, EOMI.\par RESPIRATORY: Respiratory rate is regular, not dyspneic on examination.\par LYMPHATICS: There is no inguinal lymphadenopathy\par INTEGUMENTARY: Skin is clean, dry, and intact about the bilateral lower extremities and lumbar spine.\par VASCULAR: There is brisk capillary refill about the bilateral lower extremities.\par NEUROLOGIC: There are no pathologic reflexes. There is no decrease in sensation of the bilateral lower extremities on Wartenberg pinwheel/manual examination. Deep tendon reflexes are well maintained at 2+/4 of the bilateral lower extremities and are symmetric. Subjective neurogenic claudication. Subjective feet paresthesias. \par MUSCULOSKELETAL: There is no visible muscular atrophy. Manual motor strength is well maintained in the bilateral lower extremities.  The patient ambulates in a non-myelopathic manner. Negative tension sign and straight leg raise bilaterally. Quad extension, ankle dorsiflexion, EHL, plantar flexion, and ankle eversion are well preserved. Normal secondary orthopaedic exam of bilateral hips, greater trochanteric area, knees and ankles. \par Mechanically oriented low back pain. Patient ceased rocking when standing and when being examined.  [de-identified] : Xray of the lumbar spine taken 02/23/2021 demonstrates a convexity to the right with multiple levels of lumbar degenerative disc disease from L2 to S1 with a mild grade 1 spondylolisthesis at L4-L5.

## 2021-02-23 NOTE — DISCUSSION/SUMMARY
[de-identified] : I advised her to complete her mastectomy prior to surgical conversations regarding her spine. Patient has been referred to pain management for assessment regarding pain control and injection therapy. We also spoke about the benefits of using heat, ice, and Bengay cream. Patient to follow up on a PRN basis. \par \par Patient with multiple medical comorbidity increasing the risk of perioperative and postoperative complications as well as diminished spine outcomes as per the current medical literature. These include but are not limited to obesity, anxiety/depression, cardiac illness, kidney disease, peripheral vascular disease, history of cancer, COPD, dysmetabolic syndrome including but not limited to diabetes, hyperlipidemia, hypertension. Patient is being referred back to primary care provider for medical optimization, as well as other appropriate specialists as needed for optimization prior to spine surgery. \par

## 2021-02-24 ENCOUNTER — APPOINTMENT (OUTPATIENT)
Dept: INTERNAL MEDICINE | Facility: CLINIC | Age: 61
End: 2021-02-24
Payer: MEDICAID

## 2021-02-24 VITALS
HEART RATE: 99 BPM | DIASTOLIC BLOOD PRESSURE: 78 MMHG | TEMPERATURE: 97.2 F | HEIGHT: 62 IN | SYSTOLIC BLOOD PRESSURE: 120 MMHG | BODY MASS INDEX: 35.88 KG/M2 | OXYGEN SATURATION: 95 % | WEIGHT: 195 LBS

## 2021-02-24 PROCEDURE — 99214 OFFICE O/P EST MOD 30 MIN: CPT

## 2021-02-24 PROCEDURE — 99072 ADDL SUPL MATRL&STAF TM PHE: CPT

## 2021-02-24 NOTE — PHYSICAL EXAM
[No Acute Distress] : no acute distress [Well Nourished] : well nourished [Well Developed] : well developed [Normal Sclera/Conjunctiva] : normal sclera/conjunctiva [Normal Outer Ear/Nose] : the outer ears and nose were normal in appearance [No JVD] : no jugular venous distention [No Respiratory Distress] : no respiratory distress  [No Accessory Muscle Use] : no accessory muscle use [Clear to Auscultation] : lungs were clear to auscultation bilaterally [Normal Rate] : normal rate  [Regular Rhythm] : with a regular rhythm [Alert and Oriented x3] : oriented to person, place, and time

## 2021-02-24 NOTE — ASSESSMENT
[FreeTextEntry1] : the breast cancer is to be treated by surgery. the BP is well controlled and she is to continue the current medication. the back pain has been getting worse and the patient is to go tot he pain management eople for treatment of the problem.

## 2021-02-24 NOTE — HISTORY OF PRESENT ILLNESS
[FreeTextEntry1] : f/u to back pain, HTN and breast pain with cancer [de-identified] : the patient is having continuing back pain and has been referred t pain management. the BP is under good control and the peripheral edema is under good control. She is also to have the breast cancer dealt with.\par the Lexapro is doing it's job.

## 2021-02-26 ENCOUNTER — RX RENEWAL (OUTPATIENT)
Age: 61
End: 2021-02-26

## 2021-03-02 ENCOUNTER — NON-APPOINTMENT (OUTPATIENT)
Age: 61
End: 2021-03-02

## 2021-03-05 DIAGNOSIS — B37.0 CANDIDAL STOMATITIS: ICD-10-CM

## 2021-03-09 ENCOUNTER — APPOINTMENT (OUTPATIENT)
Dept: SURGERY | Facility: CLINIC | Age: 61
End: 2021-03-09

## 2021-03-09 ENCOUNTER — APPOINTMENT (OUTPATIENT)
Dept: PLASTIC SURGERY | Facility: CLINIC | Age: 61
End: 2021-03-09
Payer: MEDICAID

## 2021-03-09 ENCOUNTER — APPOINTMENT (OUTPATIENT)
Dept: PLASTIC SURGERY | Facility: CLINIC | Age: 61
End: 2021-03-09

## 2021-03-09 VITALS
OXYGEN SATURATION: 89 % | BODY MASS INDEX: 34.78 KG/M2 | HEIGHT: 62 IN | SYSTOLIC BLOOD PRESSURE: 121 MMHG | DIASTOLIC BLOOD PRESSURE: 74 MMHG | WEIGHT: 189 LBS | HEART RATE: 83 BPM

## 2021-03-09 PROCEDURE — 99072 ADDL SUPL MATRL&STAF TM PHE: CPT

## 2021-03-09 PROCEDURE — 99214 OFFICE O/P EST MOD 30 MIN: CPT

## 2021-03-16 ENCOUNTER — APPOINTMENT (OUTPATIENT)
Dept: CARDIOLOGY | Facility: CLINIC | Age: 61
End: 2021-03-16

## 2021-03-24 ENCOUNTER — APPOINTMENT (OUTPATIENT)
Dept: INTERNAL MEDICINE | Facility: CLINIC | Age: 61
End: 2021-03-24
Payer: MEDICAID

## 2021-03-24 VITALS
HEIGHT: 62 IN | TEMPERATURE: 97.2 F | SYSTOLIC BLOOD PRESSURE: 144 MMHG | DIASTOLIC BLOOD PRESSURE: 80 MMHG | WEIGHT: 190 LBS | HEART RATE: 81 BPM | BODY MASS INDEX: 34.96 KG/M2 | OXYGEN SATURATION: 98 %

## 2021-03-24 DIAGNOSIS — F32.9 MAJOR DEPRESSIVE DISORDER, SINGLE EPISODE, UNSPECIFIED: ICD-10-CM

## 2021-03-24 DIAGNOSIS — M48.061 SPINAL STENOSIS, LUMBAR REGION WITHOUT NEUROGENIC CLAUDICATION: ICD-10-CM

## 2021-03-24 PROCEDURE — 99072 ADDL SUPL MATRL&STAF TM PHE: CPT

## 2021-03-24 PROCEDURE — 99214 OFFICE O/P EST MOD 30 MIN: CPT

## 2021-03-25 NOTE — H&P PST ADULT - DENTITION
[No studies available for review at this time.] : No studies available for review at this time. normal

## 2021-03-30 ENCOUNTER — RX RENEWAL (OUTPATIENT)
Age: 61
End: 2021-03-30

## 2021-03-30 ENCOUNTER — APPOINTMENT (OUTPATIENT)
Dept: SURGERY | Facility: CLINIC | Age: 61
End: 2021-03-30
Payer: MEDICAID

## 2021-03-30 VITALS
OXYGEN SATURATION: 97 % | SYSTOLIC BLOOD PRESSURE: 131 MMHG | DIASTOLIC BLOOD PRESSURE: 82 MMHG | WEIGHT: 189 LBS | TEMPERATURE: 97.8 F | BODY MASS INDEX: 34.78 KG/M2 | HEART RATE: 80 BPM | HEIGHT: 62 IN

## 2021-03-30 DIAGNOSIS — Z12.39 ENCOUNTER FOR OTHER SCREENING FOR MALIGNANT NEOPLASM OF BREAST: ICD-10-CM

## 2021-03-30 PROCEDURE — 99072 ADDL SUPL MATRL&STAF TM PHE: CPT

## 2021-03-30 PROCEDURE — 99213 OFFICE O/P EST LOW 20 MIN: CPT

## 2021-04-06 DIAGNOSIS — Z87.891 PERSONAL HISTORY OF NICOTINE DEPENDENCE: ICD-10-CM

## 2021-04-06 NOTE — HISTORY OF PRESENT ILLNESS
[FreeTextEntry1] : Ms. DEX LOPEZ  is a 60 year old female with past medical history of COPD, HLD, HTN, anxiety, DVT,left breast cancer which was treated in 2018 with lumpectomy and radiation therapy and anastrazole. Pt states since her treatment she developed lymphedema of the left breast and has severe pain. Pt was sent to the office by Dr Goldstein because she is thinking about undergoing b/l mastectomy and is here to discuss her reconstructive options\par pt was seen by myself in 10/2919 to discuss the same surgery\par \par \par smoking status: current smoker 5 cig/day\par anticoagulation: none\par history of bleeding disorder: none\par family history of breast cancer: none\par \par Discussed at last visit to do bilateral oncoplastic flat closure after mastectomy, at the time of the visit pt was having severe back pain and was instructed to see pain management \par

## 2021-04-06 NOTE — ASSESSMENT
[FreeTextEntry1] : 59 yo female with history of left breast cancer which was treated with lumpectomy and radiation 2018 presents to discuss options\par \par The patient was counseled about reconstructive options following mastectomy, including autologus, prosthetic, and the options of foregoing reconstruction.  Additionally, she was explained the options regarding the timing of reconstruction, including immediate reconstruction at the time of mastectomy or delayed reconstruction at a later date. \par \par The patient was extensively counseled on the operation and the perioperative recoveries of both autologous and prosthetic reconstructions.  The patient understands the risks with abdominally based microsurgical reconstruction including partial or total flap loss, revisit to the operating room in the niurka-operative period, wound dehiscence, mastectomy skin flap necrosis, fat necrosis, abdominal wall morbidity (laxity, bulge, hernia), asymmetry, parasthesia, seroma, hematoma, and infection.  She also understands the risks with implant-based reconstruction including infection, implant malposition, extrusion, deflation, capsular contracture, mastectomy skin flap necrosis, wound dehiscence, asymmetry, seroma, parasthesia, and hematoma. \par \par The patient understands all of these risks and she provides informed consent.\par \par The plan is for the pt to get her back pain under control prior to any surgical intervention which will likely cause her more temporary pain.

## 2021-04-13 ENCOUNTER — APPOINTMENT (OUTPATIENT)
Dept: RADIATION ONCOLOGY | Facility: CLINIC | Age: 61
End: 2021-04-13
Payer: MEDICAID

## 2021-04-13 VITALS
DIASTOLIC BLOOD PRESSURE: 66 MMHG | OXYGEN SATURATION: 86 % | RESPIRATION RATE: 18 BRPM | TEMPERATURE: 98.1 F | BODY MASS INDEX: 35.36 KG/M2 | HEART RATE: 83 BPM | SYSTOLIC BLOOD PRESSURE: 110 MMHG | HEIGHT: 62 IN | WEIGHT: 192.13 LBS

## 2021-04-13 DIAGNOSIS — I89.0 LYMPHEDEMA, NOT ELSEWHERE CLASSIFIED: ICD-10-CM

## 2021-04-13 PROCEDURE — 99212 OFFICE O/P EST SF 10 MIN: CPT

## 2021-04-13 PROCEDURE — 99072 ADDL SUPL MATRL&STAF TM PHE: CPT

## 2021-04-14 PROBLEM — I89.0 LYMPHEDEMA OF BREAST: Status: ACTIVE | Noted: 2019-09-30

## 2021-04-14 NOTE — VITALS
[80: Normal activity with effort; some signs or symptoms of disease.] : 80: Normal activity with effort; some signs or symptoms of disease.  [ECOG Performance Status: 1 - Restricted in physically strenuous activity but ambulatory and able to carry out work of a light or sedentary nature] : Performance Status: 1 - Restricted in physically strenuous activity but ambulatory and able to carry out work of a light or sedentary nature, e.g., light house work, office work [Maximal Pain Intensity: 6/10] : 6/10 [Least Pain Intensity: 2/10] : 2/10 [Pain Location: ___] : Pain Location: [unfilled] [Opioid] : opioid

## 2021-04-14 NOTE — HISTORY OF PRESENT ILLNESS
[FreeTextEntry1] : \par This 60 year-old woman will be seen for routine follow up.  She has a history of invasive lobular carcinoma, ER+, MD negative, HER2 negative , [id number] of the left breast.  She is s/p left breast lumpectomy in December, 2018 with Dr. Farias.  She received 4240 cGy radiation therapy to the left breas,t completed on 4/18/2019.\par \par At last visit and today , she reported being maintained on Tamoxifen.  Per Dr. Goldstein she was partially compliant with hormonal therapy. She complaints of being very anxious.\par \par At last visit and today , she noted chronic lymphedema and tanning of her left treated breast.\par \par Follows with Dr. Goldstein.  She was to follow with her the end of October 2020 for surgical planning.  She is interested in undergoing b/l mastectomy with limited  reconstruction, with oncoplastic closure.  She was advised to follow-up with Dr. Lao.  \par \par Interval history is remarkable for Cholecystectomy and abdominal hernia repair  per Dr. Marc Finkelstein.\par \par \par

## 2021-04-14 NOTE — REVIEW OF SYSTEMS
[Joint Pain] : joint pain [Muscle Pain] : muscle pain [Negative] : Allergic/Immunologic [Anxiety] : anxiety [FreeTextEntry9] : chronic [de-identified] : tenderness  and swelling of left breast

## 2021-04-14 NOTE — ASSESSMENT
[No evidence of disease] : No evidence of disease [FreeTextEntry1] : Notes uncomfortable pain and swelling of left treated breast.

## 2021-04-14 NOTE — DISEASE MANAGEMENT
[Pathological] : TNM Stage: p [IA] : IA [FreeTextEntry4] : invasive lobular carcinoma [TTNM] : 1c [NTNM] : 0 [MTNM] : 0 [de-identified] : 0213 cGy [de-identified] : left breast

## 2021-04-14 NOTE — LETTER GREETING
[Dear Doctor] : Dear Doctor, [Follow-Up] : Your patient, [unfilled] was seen in my office today for follow-up [Please see my note below.] : Please see my note below. [FreeTextEntry2] : Srikanth Peterson MD\par Yashira Goldstein MD

## 2021-04-14 NOTE — LETTER CLOSING
[Sincerely yours,] : Sincerely yours, [FreeTextEntry3] : Roland Fuentes MD\par Physician in Chief\par Department of Radiation Medicine\par [facility]\par [facility]\par \par  of Radiation Medicine\par [facility]\par at  [facility]\par \par Radiation \par [facility]/\par [facility] [place]\par [address]\par [place], [place] [address]\par \par Tel: [phone]\par Fax: [phone]\par

## 2021-04-20 ENCOUNTER — APPOINTMENT (OUTPATIENT)
Dept: PLASTIC SURGERY | Facility: CLINIC | Age: 61
End: 2021-04-20
Payer: MEDICAID

## 2021-04-20 ENCOUNTER — NON-APPOINTMENT (OUTPATIENT)
Age: 61
End: 2021-04-20

## 2021-04-20 VITALS
DIASTOLIC BLOOD PRESSURE: 90 MMHG | BODY MASS INDEX: 34.23 KG/M2 | TEMPERATURE: 97.6 F | OXYGEN SATURATION: 87 % | WEIGHT: 186 LBS | HEART RATE: 67 BPM | SYSTOLIC BLOOD PRESSURE: 163 MMHG | HEIGHT: 62 IN

## 2021-04-20 VITALS — WEIGHT: 188 LBS | HEIGHT: 62 IN | BODY MASS INDEX: 34.6 KG/M2

## 2021-04-20 PROCEDURE — 99215 OFFICE O/P EST HI 40 MIN: CPT

## 2021-04-20 PROCEDURE — 99072 ADDL SUPL MATRL&STAF TM PHE: CPT

## 2021-04-20 NOTE — PLAN
[[place]] : referred to [place] [phone] [FreeTextEntry1] : - Low Dose CT chest for lung cancer screening.\par \par

## 2021-04-20 NOTE — HISTORY OF PRESENT ILLNESS
[TextBox_13] : Referred by Dr. Melvin Hopson.\par \par Ms. LOPEZ is a 60 year old female with a history of HTN, high cholesterol, left breast cancer s/p lumpectomy and RT (2018), COPD and emphysema.\par \par She was called to review eligibility for Low-Dose CT lung cancer screening.  Reviewed and confirmed that the patient meets screening eligibility criteria:\par \par 60 years old \par \par Smoking Status: Former smoker, recently quit\par \par Number of pack(s) per day: 1\par Number of years smoked: 31\par Number of pack years smokin\par \par Ms. LOPEZ denies any symptoms of lung cancer, including new cough, change in cough, hemoptysis, and unintentional weight loss.\par \par Ms. LOPEZ denies any personal history of lung cancer.  No lung cancer in a first degree relative.  Denies any history of occupational exposures.

## 2021-04-20 NOTE — DATA REVIEWED
[Lung Cancer Screening] : Patient underwent lung cancer screening [2] : 2 [TextBox_12] : 12/17 [TextBox_27] : 1/19 [TextBox_42] : 2/20

## 2021-04-21 ENCOUNTER — APPOINTMENT (OUTPATIENT)
Dept: INTERNAL MEDICINE | Facility: CLINIC | Age: 61
End: 2021-04-21

## 2021-04-23 ENCOUNTER — APPOINTMENT (OUTPATIENT)
Dept: CARDIOLOGY | Facility: CLINIC | Age: 61
End: 2021-04-23

## 2021-04-23 NOTE — HISTORY OF PRESENT ILLNESS
[FreeTextEntry1] : Ms. DEX LOPEZ  is a 60 year old female with past medical history of COPD, HLD, HTN, anxiety, DVT,left breast cancer which was treated in 2018 with lumpectomy and radiation therapy and anastrazole. Pt states since her treatment she developed lymphedema of the left breast and has severe pain. Pt was sent to the office by Dr Goldstein because she is thinking about undergoing b/l mastectomy and is here to discuss her reconstructive options\par pt was seen by myself in 10/2919 to discuss the same surgery\par \par \par smoking status: current smoker 5 cig/day\par anticoagulation: none\par history of bleeding disorder: none\par family history of breast cancer: none\par \par Discussed at January 19th visit to do bilateral oncoplastic flat closure after mastectomy, at the time of the visit pt was having severe back pain and was instructed to see pain management\par Pt states that she has seen pain management but has not yet started her epidural treatments, she is also smoking about 2 cigarettes per week. Pt presents today to discuss JESUS flap surgery\par

## 2021-04-23 NOTE — ASSESSMENT
[FreeTextEntry1] : 59 yo female with history of left breast cancer treated with lumpectomy and radiation in 2018. Pt presented to the office today to discuss JESUS flap surgery\par \par I have instructed the patient that she needs to have her pain under control prior to any surgical intervention.  She needs to begin her epidural treatments to see if that helps with his pain.\par She also needs to be smoke free for at least 6-8 weeks prior to any surgical intervention\par She would also need clearance from her pulmonologist\par \par I have discussed with the patient at this time the risks associated with the JESUS flap surgery are too great due to her history of smoking, BMI, and pre-surgical pain.  The plan as of now is for bilateral flat closure.  \par \par Pt was instructed to make an appointment and bring her Daughter and Sister who are her support system so that they are all aware of what is involved in the post surgical aspect and pain of a JESUS flap surgery.  \par \par

## 2021-04-26 ENCOUNTER — OUTPATIENT (OUTPATIENT)
Dept: OUTPATIENT SERVICES | Facility: HOSPITAL | Age: 61
LOS: 1 days | End: 2021-04-26
Payer: COMMERCIAL

## 2021-04-26 ENCOUNTER — APPOINTMENT (OUTPATIENT)
Dept: CT IMAGING | Facility: CLINIC | Age: 61
End: 2021-04-26
Payer: MEDICAID

## 2021-04-26 DIAGNOSIS — Z87.891 PERSONAL HISTORY OF NICOTINE DEPENDENCE: ICD-10-CM

## 2021-04-26 DIAGNOSIS — Z98.891 HISTORY OF UTERINE SCAR FROM PREVIOUS SURGERY: Chronic | ICD-10-CM

## 2021-04-26 DIAGNOSIS — Z90.49 ACQUIRED ABSENCE OF OTHER SPECIFIED PARTS OF DIGESTIVE TRACT: Chronic | ICD-10-CM

## 2021-04-26 DIAGNOSIS — Z00.8 ENCOUNTER FOR OTHER GENERAL EXAMINATION: ICD-10-CM

## 2021-04-26 DIAGNOSIS — Z98.890 OTHER SPECIFIED POSTPROCEDURAL STATES: Chronic | ICD-10-CM

## 2021-04-26 PROCEDURE — 71271 CT THORAX LUNG CANCER SCR C-: CPT

## 2021-04-26 PROCEDURE — 71271 CT THORAX LUNG CANCER SCR C-: CPT | Mod: 26

## 2021-05-02 ENCOUNTER — APPOINTMENT (OUTPATIENT)
Dept: DISASTER EMERGENCY | Facility: CLINIC | Age: 61
End: 2021-05-02

## 2021-05-02 LAB — SARS-COV-2 N GENE NPH QL NAA+PROBE: NOT DETECTED

## 2021-05-03 ENCOUNTER — APPOINTMENT (OUTPATIENT)
Dept: INTERNAL MEDICINE | Facility: CLINIC | Age: 61
End: 2021-05-03
Payer: MEDICAID

## 2021-05-03 VITALS
DIASTOLIC BLOOD PRESSURE: 64 MMHG | RESPIRATION RATE: 21 BRPM | HEIGHT: 62 IN | WEIGHT: 188 LBS | BODY MASS INDEX: 34.6 KG/M2 | HEART RATE: 60 BPM | SYSTOLIC BLOOD PRESSURE: 116 MMHG

## 2021-05-03 DIAGNOSIS — M54.42 LUMBAGO WITH SCIATICA, LEFT SIDE: ICD-10-CM

## 2021-05-03 DIAGNOSIS — M54.41 LUMBAGO WITH SCIATICA, RIGHT SIDE: ICD-10-CM

## 2021-05-03 PROCEDURE — 99214 OFFICE O/P EST MOD 30 MIN: CPT

## 2021-05-03 PROCEDURE — 99072 ADDL SUPL MATRL&STAF TM PHE: CPT

## 2021-05-03 NOTE — RESULTS/DATA
[de-identified] : n/a [de-identified] : n/a [de-identified] : n/a [de-identified] : nsr no acute changes

## 2021-05-03 NOTE — PHYSICAL EXAM
[No Acute Distress] : no acute distress [Well Nourished] : well nourished [Well Developed] : well developed [Well-Appearing] : well-appearing [Normal Sclera/Conjunctiva] : normal sclera/conjunctiva [PERRL] : pupils equal round and reactive to light [EOMI] : extraocular movements intact [Normal Outer Ear/Nose] : the outer ears and nose were normal in appearance [Normal Oropharynx] : the oropharynx was normal [No JVD] : no jugular venous distention [No Lymphadenopathy] : no lymphadenopathy [Supple] : supple [Thyroid Normal, No Nodules] : the thyroid was normal and there were no nodules present [No Respiratory Distress] : no respiratory distress  [No Accessory Muscle Use] : no accessory muscle use [Clear to Auscultation] : lungs were clear to auscultation bilaterally [Normal Rate] : normal rate  [Regular Rhythm] : with a regular rhythm [Normal S1, S2] : normal S1 and S2 [No Murmur] : no murmur heard [No Carotid Bruits] : no carotid bruits [No Abdominal Bruit] : a ~M bruit was not heard ~T in the abdomen [No Varicosities] : no varicosities [Pedal Pulses Present] : the pedal pulses are present [No Edema] : there was no peripheral edema [No Palpable Aorta] : no palpable aorta [No Extremity Clubbing/Cyanosis] : no extremity clubbing/cyanosis [Soft] : abdomen soft [Non Tender] : non-tender [Non-distended] : non-distended [No Masses] : no abdominal mass palpated [No HSM] : no HSM [Normal Bowel Sounds] : normal bowel sounds [Normal Posterior Cervical Nodes] : no posterior cervical lymphadenopathy [Normal Anterior Cervical Nodes] : no anterior cervical lymphadenopathy [No CVA Tenderness] : no CVA  tenderness [No Spinal Tenderness] : no spinal tenderness [No Joint Swelling] : no joint swelling [Grossly Normal Strength/Tone] : grossly normal strength/tone [No Rash] : no rash [Coordination Grossly Intact] : coordination grossly intact [No Focal Deficits] : no focal deficits [Normal Gait] : normal gait [Deep Tendon Reflexes (DTR)] : deep tendon reflexes were 2+ and symmetric [Normal Affect] : the affect was normal [Normal Insight/Judgement] : insight and judgment were intact [de-identified] : cervical and lumbar radiculapathy

## 2021-05-03 NOTE — HISTORY OF PRESENT ILLNESS
[Aortic Stenosis] : no aortic stenosis [Atrial Fibrillation] : no atrial fibrillation [Coronary Artery Disease] : no coronary artery disease [Recent Myocardial Infarction] : no recent myocardial infarction [Implantable Device/Pacemaker] : no implantable device/pacemaker [Asthma] : no asthma [COPD] : COPD [Sleep Apnea] : no sleep apnea [Smoker] : not a smoker [No Adverse Anesthesia Reaction] : no adverse anesthesia reaction in self or family member [Family Member] : no family member with adverse anesthesia reaction/sudden death [Self] : no previous adverse anesthesia reaction [Chronic Anticoagulation] : no chronic anticoagulation [Chronic Kidney Disease] : no chronic kidney disease [Diabetes] : no diabetes [Moderate (4-6 METs)] : Moderate (4-6 METs) [FreeTextEntry1] : Epidural Lumbar spine and Cervical Spine one week apart [FreeTextEntry2] : 5/5/21 and 5/12/21 [FreeTextEntry3] : Dr. Sears [FreeTextEntry4] : Patient with history of chronic pain with DJD, recent breast cancer, bipolar disorder who will undergo epidural of lumbar spine and cervical spine

## 2021-05-03 NOTE — ASSESSMENT
[Patient Optimized for Surgery] : Patient optimized for surgery [No Further Testing Recommended] : no further testing recommended [FreeTextEntry4] : Patient is undergoing low risk procedure.  She has no current contraindication to planned procedures and should tolerate procedure without difficulty [FreeTextEntry6] : no nsaids [FreeTextEntry7] : take meds as usual

## 2021-05-04 ENCOUNTER — TRANSCRIPTION ENCOUNTER (OUTPATIENT)
Age: 61
End: 2021-05-04

## 2021-05-05 ENCOUNTER — OUTPATIENT (OUTPATIENT)
Dept: OUTPATIENT SERVICES | Facility: HOSPITAL | Age: 61
LOS: 1 days | End: 2021-05-05
Payer: COMMERCIAL

## 2021-05-05 DIAGNOSIS — Z98.890 OTHER SPECIFIED POSTPROCEDURAL STATES: Chronic | ICD-10-CM

## 2021-05-05 DIAGNOSIS — Z90.49 ACQUIRED ABSENCE OF OTHER SPECIFIED PARTS OF DIGESTIVE TRACT: Chronic | ICD-10-CM

## 2021-05-05 DIAGNOSIS — Z98.891 HISTORY OF UTERINE SCAR FROM PREVIOUS SURGERY: Chronic | ICD-10-CM

## 2021-05-05 DIAGNOSIS — M54.16 RADICULOPATHY, LUMBAR REGION: ICD-10-CM

## 2021-05-05 RX ORDER — AMLODIPINE BESYLATE AND BENAZEPRIL HYDROCHLORIDE 5; 40 MG/1; MG/1
5-40 CAPSULE ORAL
Qty: 90 | Refills: 3 | Status: ACTIVE | COMMUNITY
Start: 2018-12-11 | End: 1900-01-01

## 2021-05-07 DIAGNOSIS — Z01.818 ENCOUNTER FOR OTHER PREPROCEDURAL EXAMINATION: ICD-10-CM

## 2021-05-08 ENCOUNTER — APPOINTMENT (OUTPATIENT)
Dept: DISASTER EMERGENCY | Facility: CLINIC | Age: 61
End: 2021-05-08

## 2021-05-09 LAB — SARS-COV-2 N GENE NPH QL NAA+PROBE: NOT DETECTED

## 2021-05-11 ENCOUNTER — TRANSCRIPTION ENCOUNTER (OUTPATIENT)
Age: 61
End: 2021-05-11

## 2021-05-12 ENCOUNTER — OUTPATIENT (OUTPATIENT)
Dept: OUTPATIENT SERVICES | Facility: HOSPITAL | Age: 61
LOS: 1 days | End: 2021-05-12
Payer: COMMERCIAL

## 2021-05-12 DIAGNOSIS — M54.12 RADICULOPATHY, CERVICAL REGION: ICD-10-CM

## 2021-05-12 DIAGNOSIS — Z98.890 OTHER SPECIFIED POSTPROCEDURAL STATES: Chronic | ICD-10-CM

## 2021-05-12 DIAGNOSIS — Z90.49 ACQUIRED ABSENCE OF OTHER SPECIFIED PARTS OF DIGESTIVE TRACT: Chronic | ICD-10-CM

## 2021-05-12 DIAGNOSIS — Z98.891 HISTORY OF UTERINE SCAR FROM PREVIOUS SURGERY: Chronic | ICD-10-CM

## 2021-05-12 PROCEDURE — 77003 FLUOROGUIDE FOR SPINE INJECT: CPT

## 2021-05-12 PROCEDURE — 62323 NJX INTERLAMINAR LMBR/SAC: CPT

## 2021-05-12 PROCEDURE — 62321 NJX INTERLAMINAR CRV/THRC: CPT

## 2021-05-13 ENCOUNTER — APPOINTMENT (OUTPATIENT)
Dept: INTERNAL MEDICINE | Facility: CLINIC | Age: 61
End: 2021-05-13

## 2021-05-14 ENCOUNTER — APPOINTMENT (OUTPATIENT)
Dept: INTERNAL MEDICINE | Facility: CLINIC | Age: 61
End: 2021-05-14

## 2021-05-19 ENCOUNTER — APPOINTMENT (OUTPATIENT)
Dept: PLASTIC SURGERY | Facility: HOSPITAL | Age: 61
End: 2021-05-19

## 2021-05-28 PROBLEM — Z12.39 BREAST CANCER SCREENING: Status: ACTIVE | Noted: 2019-10-15

## 2021-05-28 NOTE — PHYSICAL EXAM
[Normocephalic] : normocephalic [Atraumatic] : atraumatic [EOMI] : extra ocular movement intact [PERRL] : pupils equal, round and reactive to light [Sclera nonicteric] : sclera nonicteric [Supple] : supple [No Supraclavicular Adenopathy] : no supraclavicular adenopathy [Examined in the supine and seated position] : examined in the supine and seated position [No dominant masses] : no dominant masses in right breast  [No dominant masses] : no dominant masses left breast [No Nipple Retraction] : no left nipple retraction [No Nipple Discharge] : no left nipple discharge [No Axillary Lymphadenopathy] : no left axillary lymphadenopathy [No Edema] : no edema [No Rashes] : no rashes [No Ulceration] : no ulceration [de-identified] : No supraclavicular or axillary adenopathy. No dominant masses, normal to palpation. Everted nipple without discharge. No skin changes.  [de-identified] : No supraclavicular or axillary adenopathy. Large ptotic breast. No dominant masses, moderate induration and hyperpigmentation secondary to radiation. Everted nipple without discharge.

## 2021-05-28 NOTE — HISTORY OF PRESENT ILLNESS
[FreeTextEntry1] : Kemi is a jv 58 yo postmenopausal female who presents with treated left breast cancer with breast conservation by Dr. Charlie Farias (10/16/2018).\par \par Kemi underwent left breast needle localization lumpectomy on 10/16/2018.  Final pathology showed the tumor less than 1 mm from posterior margin.  She went back to the OR for reexcision of the margins on 12/14/2018 and final pathology showed ILC and the invasive tumor was 0.1 cm from the surgical resection.  0/2 lymph nodes were negative for carcinoma.  She completed radiation and was partially compliant with hormonal therapy.\par \par She was asked what her goals of surgery are: she states that she wants her breasts off and she doesn't want to the pain anymore.  She was informed that even with removal of both breasts, there could be pain related to radiation because of the risk of radiation changes to the chest wall.  She can also have pain after mastectomy.  She states that she understands but secondary to the chronic lymphedema of the left breast and the pain and heaviness, that she would like to undergo mastectomy without reconstruction but with oncoplastic skin closure.  We discussed the technical aspects of surgery at length.  She wants to undergo bilateral mastectomy and she understands risks v benefits in addition to the possibility of persisting pain.  \par \par 7/7/2020  US breast complete BI/ MG mammo diag w gilberto BI\par Impression:  No significant mammographic change.  No sonographic evidence of malignancy.  No suspicious changes bilaterally.  BIRADS 2 benign findings\par \par Recommendation is for follow up with Dr. Lao to discuss breast reconstruction options in particular JESUS procedure.  She is still smoking and has not undergone pain management.  Plan for surgery.\par \par All questions were answered.

## 2021-05-28 NOTE — ASSESSMENT
[FreeTextEntry1] : 61 yo postmenopausal female with history of Stage 1 left breast cancer  T1cN0, treatment with lumpectomy, radiation and hormonal therapy (partially compliant). Plan for bilateral mastectomy and will follow up with  to be re-evaluated for JESUS.\par 1. Follow up with \par 2.  Bilateral mastectomy\par

## 2021-06-14 DIAGNOSIS — M54.2 CERVICALGIA: ICD-10-CM

## 2021-06-21 ENCOUNTER — APPOINTMENT (OUTPATIENT)
Dept: INTERNAL MEDICINE | Facility: CLINIC | Age: 61
End: 2021-06-21

## 2021-06-24 ENCOUNTER — APPOINTMENT (OUTPATIENT)
Dept: ORTHOPEDIC SURGERY | Facility: CLINIC | Age: 61
End: 2021-06-24
Payer: MEDICAID

## 2021-06-24 VITALS
BODY MASS INDEX: 34.6 KG/M2 | SYSTOLIC BLOOD PRESSURE: 147 MMHG | HEART RATE: 73 BPM | HEIGHT: 62 IN | DIASTOLIC BLOOD PRESSURE: 75 MMHG | WEIGHT: 188 LBS

## 2021-06-24 DIAGNOSIS — M50.90 CERVICAL DISC DISORDER, UNSPECIFIED, UNSPECIFIED CERVICAL REGION: ICD-10-CM

## 2021-06-24 DIAGNOSIS — Z85.3 PERSONAL HISTORY OF MALIGNANT NEOPLASM OF BREAST: ICD-10-CM

## 2021-06-24 DIAGNOSIS — J44.9 CHRONIC OBSTRUCTIVE PULMONARY DISEASE, UNSPECIFIED: ICD-10-CM

## 2021-06-24 PROCEDURE — 99072 ADDL SUPL MATRL&STAF TM PHE: CPT

## 2021-06-24 PROCEDURE — 99214 OFFICE O/P EST MOD 30 MIN: CPT

## 2021-06-24 NOTE — ADDENDUM
[FreeTextEntry1] : Documented by Jose Dockery acting as a scribe for Dr. Francisco Otto on 06/24/2021. All medical record entries made by the Scribe were at my, Dr. Francisco Otto, direction and personally dictated by me on 06/24/2021 . I have reviewed the chart and agree that the record accurately reflects my personal performance of the history, physical exam, assessment and plan. I have also personally directed, reviewed, and agreed with the chart.

## 2021-06-24 NOTE — DISCUSSION/SUMMARY
[de-identified] : Anticipatory guidance regarding Patient's specific wishes for nonoperative management.Patient clearly states he's not interested in surgical management. Patient states she does not think she could tolerate a cervical MRI lumbar surgery.Concerning for me is her chronic pain as well as and probably more importantly is her COPD\par \par A long discussion was had with the patient regarding surgical plan. Patient is aware that surgery is elective in nature and is choosing to proceed with surgery. Risks, benefits, alternatives were discussed and all questions, comments and concerns were answered to the patient's satisfaction. The statistical probability of improvement was discussed at length as well as post surgical course.  Literature was provided regarding the specific type of surgery as well as a written surgical consent which the patient will need to sign and return to the office prior to surgical date.  \par If patient is a smoker, discontinuation of smoking was advised and must be accomplished 6-8 weeks prior to surgery date.  Patient was advised that help with quitting smoking is available through [facility] Smoker's Quit Line and phone number/website was provided, or patient can ask assistance from primary care provider.  Elective surgery will not be performed unless patient complies with this policy. \par i have discussed the risks benefits and anticipated outcome with regard to back pain and cervical pain based surgery I think this patient will still have persistent pain and discomfort despite surgical management hence she wishes for nonoperative care and referral to pain management\par \par Prior to appointment and during encounter with patient extensive medical records were reviewed including but not limited to, hospital records, out patient records, imaging results, and lab data. During this appointment the patient was examined, diagnoses were discussed and explained in a face to face manner. In addition extensive time was spent reviewing aforementioned diagnostic studies. Counseling including abnormal image results, differential diagnoses, treatment options, risk and benefits, lifestyle changes, current condition, and current medications was performed. Patient's comments, questions, and concerns were address and patient verbalized understanding. Based on this patient's presentation at our office, which is an orthopedic spine surgeon's office, this patient inherently / intrinsically has a risk, however minute, of developing  issues such as Cauda equina syndrome, bowel and bladder changes, or progression of motor or neurological deficits such as paralysis which may be permanent.

## 2021-06-24 NOTE — PHYSICAL EXAM
[Obese] : obese [Poor Appearance] : well-appearing [Acute Distress] : not in acute distress [de-identified] : CONSTITUTIONAL: The patient is a very pleasant individual who is well-nourished and who appears stated age.\par PSYCHIATRIC: The patient is alert and oriented X 3 and in no apparent distress, and participates with orthopedic evaluation well.\par HEAD: Atraumatic and is nonsyndromic in appearance.\par EENT: No visible thyromegaly, EOMI.\par RESPIRATORY: Respiratory rate is regular, not dyspneic on examination.\par LYMPHATICS: There is no inguinal lymphadenopathy\par INTEGUMENTARY: Skin is clean, dry, and intact about the bilateral lower extremities and lumbar spine.\par VASCULAR: There is brisk capillary refill about the bilateral lower extremities.\par NEUROLOGIC: There are no pathologic reflexes. TDeep tendon reflexes are well maintained at 2+/4 of the bilateral lower extremities and are symmetric. Subjective neurogenic claudication. Subjective feet paresthesias. \par MUSCULOSKELETAL: There is no visible muscular atrophy. Manual motor strength is well maintained in the bilateral lower extremities.  The patient ambulates in a non-myelopathic manner. Negative tension sign and straight leg raise bilaterally. Quad extension, ankle dorsiflexion, EHL, plantar flexion, and ankle eversion are well preserved. Normal secondary orthopaedic exam of bilateral hips, greater trochanteric area, knees and ankles. \par Mechanically oriented low back pain. Patient ceased rocking when standing and when being examined. Chronic neck pain complaints.  [de-identified] : Xray of the lumbar spine taken 02/23/2021 demonstrates a convexity to the right with multiple levels of lumbar degenerative disc disease from L2 to S1 with a mild grade 1 spondylolisthesis at L4-L5. \par \par MRI of the cervical spine taken at [facility] on 04- demonstrates C4-C5 and C5-C6 cervical degenerative disc disease, no evidence of myelomalacia changes. \par \par MRI of the lumbar spine taken at [facility] on 04- demonstrates lumbar degenerative disc disease with stenosis from L2-L3 throughout the lumbar spine in moderate fashion, facet cyst L2-L3, and grade 1 spondylolisthesis at L4-L5 \par \par

## 2021-06-24 NOTE — HISTORY OF PRESENT ILLNESS
[de-identified] : 60 year old F Presents for follow up evaluation of cervical with an acute complaint of worsening lower back pain. She has had cervical and lumbar injections at this time which she states did not help her pain. She states she is not interested in surgery, she would like a referral to a different pain management care provider to control her pain.  [Ataxia] : no ataxia [Incontinence] : no incontinence [Loss of Dexterity] : good dexterity [Urinary Ret.] : no urinary retention

## 2021-07-14 ENCOUNTER — APPOINTMENT (OUTPATIENT)
Dept: RADIATION ONCOLOGY | Facility: CLINIC | Age: 61
End: 2021-07-14
Payer: MEDICAID

## 2021-07-14 VITALS
BODY MASS INDEX: 33.47 KG/M2 | DIASTOLIC BLOOD PRESSURE: 82 MMHG | OXYGEN SATURATION: 91 % | HEART RATE: 84 BPM | SYSTOLIC BLOOD PRESSURE: 161 MMHG | RESPIRATION RATE: 17 BRPM | WEIGHT: 183 LBS

## 2021-07-14 DIAGNOSIS — Z92.3 PERSONAL HISTORY OF IRRADIATION: ICD-10-CM

## 2021-07-14 DIAGNOSIS — Z98.890 OTHER SPECIFIED POSTPROCEDURAL STATES: ICD-10-CM

## 2021-07-14 PROCEDURE — 99072 ADDL SUPL MATRL&STAF TM PHE: CPT

## 2021-07-14 PROCEDURE — 99213 OFFICE O/P EST LOW 20 MIN: CPT

## 2021-07-14 NOTE — ASSESSMENT
[No evidence of disease] : No evidence of disease [FreeTextEntry1] : Notes uncomfortable pain of left treated breast.

## 2021-07-14 NOTE — REVIEW OF SYSTEMS
[Joint Pain] : joint pain [Muscle Pain] : muscle pain [Anxiety] : anxiety [Depression] : depression [Negative] : Psychiatric [FreeTextEntry9] : chronic [de-identified] : tenderness  and swelling of left breast chronically

## 2021-07-14 NOTE — LETTER CLOSING
[Sincerely yours,] : Sincerely yours, [FreeTextEntry3] : Roland Fuentes MD\par Physician in Chief\par Department of Radiation Medicine\par [facility] [place]\par [facility] [place]\par \par  of Radiation Medicine\par [facility]\par at  [facility]\par \par Radiation \par [facility]/\par [facility] [place]\par [address]\par [place], [place] [address]\par \par Tel: [phone]\par Fax: [phone]\par

## 2021-07-14 NOTE — VITALS
[Maximal Pain Intensity: 6/10] : 6/10 [Least Pain Intensity: 2/10] : 2/10 [Pain Location: ___] : Pain Location: [unfilled] [Opioid] : opioid [80: Normal activity with effort; some signs or symptoms of disease.] : 80: Normal activity with effort; some signs or symptoms of disease.  [Pain Description/Quality: ___] : Pain description/quality: [unfilled] [Pain Duration: ___] : Pain duration: [unfilled] [Pain Interferes with ADLs] : Pain interferes with activities of daily living. [ECOG Performance Status: 1 - Restricted in physically strenuous activity but ambulatory and able to carry out work of a light or sedentary nature] : Performance Status: 1 - Restricted in physically strenuous activity but ambulatory and able to carry out work of a light or sedentary nature, e.g., light house work, office work

## 2021-07-14 NOTE — DISEASE MANAGEMENT
[Pathological] : TNM Stage: p [IA] : IA [FreeTextEntry4] : invasive lobular carcinoma [TTNM] : 1c [NTNM] : 0 [MTNM] : 0 [de-identified] : 4673 cGy [de-identified] : left breast

## 2021-07-14 NOTE — HISTORY OF PRESENT ILLNESS
[FreeTextEntry1] : This 60 year-old woman presents for routine follow up.  She has a history of invasive lobular carcinoma, ER+, CO negative, HER2 negative, [id number] of the left breast.  She is s/p left breast lumpectomy in December, 2018 with Dr. Farias.  She received 4240 cGy radiation therapy to the left breast completed on 4/18/2019.\par \par Reports being maintained on Tamoxifen.  Per Dr. Goldstein she was partially compliant with hormonal therapy. She complains of being very anxious.\par \par Patient notes chronic pain and tanning of her left treated breast.\par \par Follows with Dr. Goldstein.  Ms. Raphael  is interested in undergoing b/l mastectomy with limited  reconstruction, with oncoplastic closure. She consulted with to Dr. Lao who advised her to stop smoking and get back pain under control, and that pulmonary clearance would be needed.  She informed me that she "fired" Dr. Lao and will seek a different plastic surgeon.\par \par Interval history is remarkable for Cholecystectomy and abdominal hernia repair  per Dr. Marc Finkelstein.\par \par \par

## 2021-07-14 NOTE — PHYSICAL EXAM
[Heart Sounds] : normal S1 and S2 [Normal] : oriented to person, place and time, the affect was normal, the mood was normal and not anxious [de-identified] : left brest slightly smaller an firmer and heavier. that right.

## 2021-07-16 ENCOUNTER — APPOINTMENT (OUTPATIENT)
Dept: PHYSICAL MEDICINE AND REHAB | Facility: CLINIC | Age: 61
End: 2021-07-16
Payer: MEDICAID

## 2021-07-16 PROCEDURE — 99072 ADDL SUPL MATRL&STAF TM PHE: CPT

## 2021-07-16 PROCEDURE — 99204 OFFICE O/P NEW MOD 45 MIN: CPT

## 2021-07-16 RX ORDER — ARIPIPRAZOLE 5 MG/1
5 TABLET ORAL DAILY
Qty: 30 | Refills: 2 | Status: DISCONTINUED | COMMUNITY
Start: 2020-07-14 | End: 2021-07-16

## 2021-07-16 RX ORDER — ROSUVASTATIN CALCIUM 5 MG/1
5 TABLET, FILM COATED ORAL DAILY
Qty: 30 | Refills: 2 | Status: DISCONTINUED | COMMUNITY
Start: 2019-03-26 | End: 2021-07-16

## 2021-07-16 RX ORDER — TRAMADOL HYDROCHLORIDE AND ACETAMINOPHEN 37.5; 325 MG/1; MG/1
37.5-325 TABLET, FILM COATED ORAL EVERY 6 HOURS
Qty: 28 | Refills: 0 | Status: DISCONTINUED | COMMUNITY
Start: 2021-03-05 | End: 2021-07-16

## 2021-07-16 RX ORDER — DULOXETINE HYDROCHLORIDE 30 MG/1
30 CAPSULE, DELAYED RELEASE PELLETS ORAL TWICE DAILY
Qty: 60 | Refills: 1 | Status: DISCONTINUED | COMMUNITY
Start: 2021-02-24 | End: 2021-07-16

## 2021-07-16 RX ORDER — TRAMADOL HYDROCHLORIDE AND ACETAMINOPHEN 37.5; 325 MG/1; MG/1
37.5-325 TABLET, FILM COATED ORAL TWICE DAILY
Qty: 28 | Refills: 0 | Status: DISCONTINUED | COMMUNITY
Start: 2017-12-08 | End: 2021-07-16

## 2021-07-16 RX ORDER — CYCLOBENZAPRINE HYDROCHLORIDE 5 MG/1
5 TABLET, FILM COATED ORAL 3 TIMES DAILY
Qty: 30 | Refills: 2 | Status: DISCONTINUED | COMMUNITY
Start: 2020-12-21 | End: 2021-07-16

## 2021-07-16 NOTE — HISTORY OF PRESENT ILLNESS
[FreeTextEntry1] : Ms. Hill is a 60 year-old female with a history of invasive lobular carcinoma, ER+, ME negative, HER2 negative, [id number] of the left breast. She is s/p left breast lumpectomy in December, 2018 with Dr. Farias. She received 4240 cGy radiation therapy to the left breast completed on 4/18/2019.  She is currently on Tamoxifen. \par \par She reports since her surgery and radiation she has had pain in her left breast region.  She describes the pain as burning she also reports tightness in her left shoulder.  She describes pain with overhead activities and feels like her room range of motion is restricted to this tightness.  She denies any recent trauma or radiation of pain down into her arms.\par \par \par

## 2021-07-16 NOTE — DATA REVIEWED
[FreeTextEntry1] : MRI Cervical April 2021: Multilevel degenerative disc disease and spondylosis.  Grade 1 anterolisthesis of C3-C4 and C7-T1.  Bulging disc osteophyte complex at C3-C4 and broad-based right paracentral herniation mildly impinging upon the right ventral lateral spinal cord and mildly narrowing the central spinal canal.  Multilevel uncovertebral and facet joint hypertrophy.\par \par MRI Lumbar Spine April 2021: Mild to moderate multilevel degenerative disc disease and spondylosis with mild scoliosis and grade 1 listhesis at L4-L5 and L5-S1.  Mild disc bulge at L2-3.  Left bulging disc osteophyte complex at L5-S1 with facet joint hypertrophy resulting in mild central canal and right foraminal stenosis and moderate left foraminal stenosis with left L5 nerve root impingement.

## 2021-07-16 NOTE — ASSESSMENT
[FreeTextEntry1] : 60-year-old female with history of breast cancer presenting for evaluation.\par \par #Postmastectomy pain syndrome\par -Associated with a neuropathic and myofascial component.  Pain located in left pectoral muscle as well as postsurgical scar site.\par -Start gabapentin 100 mg twice a day for neuropathic pain.  CMP reviewed continue close monitoring given kidney function.\par -Start physical therapy for manual therapy and myofascial release to the region.\par -Breast surgery, radiation oncology and orthopedic notes reviewed.\par \par #Left shoulder pain:\par -Obtain left shoulder x-ray to further evaluate\par -Start physical therapy for shoulder range of motion\par \par Follow-up in 1 month.

## 2021-07-16 NOTE — PHYSICAL EXAM
[FreeTextEntry1] : Gen: Patient is A&O x 3, NAD\par HEENT: EOMI, hearing grossly normal\par Resp: regular, non - labored\par CV: pulses regular\par Skin: no rashes, erythema\par Lymph: no clubbing, cyanosis, edema, or palpable lymphadenopathy\par Inspection: no instability or misalignment\par ROM: Limited in left shoulder abduction\par Palpation:  TTP left pectoral muscle, TTP left lumpectomy scar site \par Sensation: intact to light touch\par Reflexes: 1+ and symmetric throughout\par Strength: 5/5 throughout\par Special tests: +Left christina sign \par Gait: normal, non-antalgic\par \par \par Entire history and physical exam performed with Geeta Parker present.  \par

## 2021-08-13 NOTE — HISTORY OF PRESENT ILLNESS
[FreeTextEntry1] : This 57 y/o woman completed radiation therapy to the left breast (4,240 cGy) on 4/18/2019.  She is s/p lumpectomy/ sentinel lymph node dissection and re-excision for a close margin of pT1c,N0,M0 (IA) infiltrating lobular carcinoma of the left breast.  She is ER/[place]+ and Her2 negative.\par Andrea taking anastrozole and complained of extreme headaches, depression, and joint aches and stopped medication.  Advised to see Dr. Easton for possible alternative medication.\par Right breast with dry desquamation, skin intact. Aquaphor given.\par Denies arm or hand swelling, nipple discharge.\par  229.9

## 2021-09-13 ENCOUNTER — APPOINTMENT (OUTPATIENT)
Dept: PLASTIC SURGERY | Facility: CLINIC | Age: 61
End: 2021-09-13
Payer: MEDICAID

## 2021-09-13 VITALS
HEIGHT: 62 IN | TEMPERATURE: 98.4 F | OXYGEN SATURATION: 94 % | BODY MASS INDEX: 34.04 KG/M2 | WEIGHT: 185 LBS | HEART RATE: 109 BPM | DIASTOLIC BLOOD PRESSURE: 64 MMHG | SYSTOLIC BLOOD PRESSURE: 125 MMHG

## 2021-09-13 DIAGNOSIS — M25.512 PAIN IN LEFT SHOULDER: ICD-10-CM

## 2021-09-13 PROCEDURE — 99072 ADDL SUPL MATRL&STAF TM PHE: CPT

## 2021-09-13 PROCEDURE — 99244 OFF/OP CNSLTJ NEW/EST MOD 40: CPT

## 2021-09-13 NOTE — HISTORY OF PRESENT ILLNESS
[FreeTextEntry1] : DXE LOPEZ is a 60 year F who presents to discuss options for breast reconstruction. She has a complicated PMH including but not limited to COPD, HLD, chronic back pain, anxiety and depression, PTSD, and left breast cancer s/p lumpectomy and radiation in 2018. She is also a former smoker and states that she has quit. \par \par She states that since the time of her lumpectomy and radiation she has experienced pain in the left breast. She in uncomfortable in a bra and feels this is debilitating. She also suffers from neck and shoulder pain as well as chronic back pain. Given the breast pain, she was considering undergoing bilateral mastectomies with Dr. Goldstein. She also hoped this would alleviate some of the back/neck/shoulder pain since her breasts a large and ptotic. She previously saw Dr. Lao to discuss reconstructive options including JESUS flap and flat closure. She states that she does not want to undergo JESUS flap surgery, and therefore sees her only choice as bilateral mastectomy and "flat closure". \par \par We subsequently discussed the option of performing a bilateral breast reduction. I explained that many women seek this operation to help alleviate back and neck pain. However, I cannot guarantee that this will be the same in her case given her other medical comorbidities. I also explained that breast reduction might not alleviate the pain in the left breast which was her initial concern. She states that she would prefer not to undergo mastectomy and that she is interested in learning more about this option. \par \par She has a 19 year old daughter who is studying American sign language.\par

## 2021-09-13 NOTE — REVIEW OF SYSTEMS
[Cough] : cough [As Noted in HPI] : as noted in HPI [Breast Pain] : breast pain [Anxiety] : anxiety [Depression] : depression [Negative] : Constitutional [Fever] : no fever [Chills] : no chills [FreeTextEntry2] : patient states she has recently gained weight since she quit smoking [FreeTextEntry6] : secondary to COPD [de-identified] : left breast pain s/p lumpectomy and radiation [de-identified] : patient reports PTSD from September 11, 2001

## 2021-09-13 NOTE — PHYSICAL EXAM
[NI] : Normal [de-identified] : NAD, AxOx3 [de-identified] : Bilateral grade 3 ptosis, Right breast larger than L. Well healed lumpectomy scar of left breast. \par \par Right:\par SN-N 36.5\par N-IMF 14\par BW 18\par \par Left:\par SN-N 37\par N-IMF 16\par BW 16

## 2021-10-25 ENCOUNTER — APPOINTMENT (OUTPATIENT)
Dept: MAMMOGRAPHY | Facility: CLINIC | Age: 61
End: 2021-10-25

## 2021-10-26 ENCOUNTER — RX RENEWAL (OUTPATIENT)
Age: 61
End: 2021-10-26

## 2021-12-01 PROCEDURE — G9005: CPT

## 2022-01-01 ENCOUNTER — APPOINTMENT (OUTPATIENT)
Dept: CT IMAGING | Facility: CLINIC | Age: 62
End: 2022-01-01
Payer: MEDICAID

## 2022-01-01 ENCOUNTER — APPOINTMENT (OUTPATIENT)
Dept: PLASTIC SURGERY | Facility: CLINIC | Age: 62
End: 2022-01-01
Payer: MEDICAID

## 2022-01-01 ENCOUNTER — TRANSCRIPTION ENCOUNTER (OUTPATIENT)
Age: 62
End: 2022-01-01

## 2022-01-01 ENCOUNTER — APPOINTMENT (OUTPATIENT)
Dept: PLASTIC SURGERY | Facility: CLINIC | Age: 62
End: 2022-01-01

## 2022-01-01 ENCOUNTER — NON-APPOINTMENT (OUTPATIENT)
Age: 62
End: 2022-01-01

## 2022-01-01 ENCOUNTER — RX RENEWAL (OUTPATIENT)
Age: 62
End: 2022-01-01

## 2022-01-01 ENCOUNTER — APPOINTMENT (OUTPATIENT)
Dept: PULMONOLOGY | Facility: CLINIC | Age: 62
End: 2022-01-01

## 2022-01-01 ENCOUNTER — APPOINTMENT (OUTPATIENT)
Dept: PULMONOLOGY | Facility: CLINIC | Age: 62
End: 2022-01-01
Payer: MEDICAID

## 2022-01-01 ENCOUNTER — OUTPATIENT (OUTPATIENT)
Dept: OUTPATIENT SERVICES | Facility: HOSPITAL | Age: 62
LOS: 1 days | End: 2022-01-01
Payer: COMMERCIAL

## 2022-01-01 ENCOUNTER — APPOINTMENT (OUTPATIENT)
Dept: PLASTIC SURGERY | Facility: HOSPITAL | Age: 62
End: 2022-01-01

## 2022-01-01 ENCOUNTER — APPOINTMENT (OUTPATIENT)
Dept: CARDIOLOGY | Facility: CLINIC | Age: 62
End: 2022-01-01

## 2022-01-01 ENCOUNTER — RESULT REVIEW (OUTPATIENT)
Age: 62
End: 2022-01-01

## 2022-01-01 ENCOUNTER — APPOINTMENT (OUTPATIENT)
Dept: PHYSICAL MEDICINE AND REHAB | Facility: CLINIC | Age: 62
End: 2022-01-01

## 2022-01-01 ENCOUNTER — APPOINTMENT (OUTPATIENT)
Dept: ULTRASOUND IMAGING | Facility: CLINIC | Age: 62
End: 2022-01-01

## 2022-01-01 ENCOUNTER — APPOINTMENT (OUTPATIENT)
Dept: RADIATION ONCOLOGY | Facility: CLINIC | Age: 62
End: 2022-01-01

## 2022-01-01 ENCOUNTER — APPOINTMENT (OUTPATIENT)
Dept: ULTRASOUND IMAGING | Facility: CLINIC | Age: 62
End: 2022-01-01
Payer: MEDICAID

## 2022-01-01 VITALS
SYSTOLIC BLOOD PRESSURE: 146 MMHG | HEART RATE: 97 BPM | DIASTOLIC BLOOD PRESSURE: 82 MMHG | BODY MASS INDEX: 35.08 KG/M2 | WEIGHT: 198 LBS | HEIGHT: 63 IN

## 2022-01-01 VITALS
TEMPERATURE: 97 F | RESPIRATION RATE: 16 BRPM | OXYGEN SATURATION: 93 % | DIASTOLIC BLOOD PRESSURE: 72 MMHG | SYSTOLIC BLOOD PRESSURE: 120 MMHG | HEART RATE: 81 BPM

## 2022-01-01 VITALS
RESPIRATION RATE: 15 BRPM | HEART RATE: 78 BPM | OXYGEN SATURATION: 98 % | SYSTOLIC BLOOD PRESSURE: 124 MMHG | TEMPERATURE: 98 F | WEIGHT: 191.8 LBS | DIASTOLIC BLOOD PRESSURE: 75 MMHG | HEIGHT: 63 IN

## 2022-01-01 VITALS
DIASTOLIC BLOOD PRESSURE: 76 MMHG | HEART RATE: 124 BPM | HEIGHT: 63 IN | RESPIRATION RATE: 16 BRPM | TEMPERATURE: 98 F | SYSTOLIC BLOOD PRESSURE: 138 MMHG | OXYGEN SATURATION: 96 % | WEIGHT: 191.8 LBS

## 2022-01-01 VITALS
WEIGHT: 194 LBS | TEMPERATURE: 97.8 F | SYSTOLIC BLOOD PRESSURE: 112 MMHG | DIASTOLIC BLOOD PRESSURE: 73 MMHG | BODY MASS INDEX: 34.38 KG/M2 | OXYGEN SATURATION: 94 % | HEIGHT: 63 IN | HEART RATE: 99 BPM

## 2022-01-01 VITALS
SYSTOLIC BLOOD PRESSURE: 128 MMHG | BODY MASS INDEX: 35.25 KG/M2 | HEART RATE: 116 BPM | RESPIRATION RATE: 16 BRPM | DIASTOLIC BLOOD PRESSURE: 70 MMHG | OXYGEN SATURATION: 98 % | WEIGHT: 199 LBS

## 2022-01-01 VITALS
HEART RATE: 97 BPM | DIASTOLIC BLOOD PRESSURE: 70 MMHG | OXYGEN SATURATION: 95 % | SYSTOLIC BLOOD PRESSURE: 116 MMHG | RESPIRATION RATE: 16 BRPM

## 2022-01-01 VITALS
TEMPERATURE: 98.6 F | SYSTOLIC BLOOD PRESSURE: 126 MMHG | HEIGHT: 63 IN | WEIGHT: 202 LBS | BODY MASS INDEX: 35.79 KG/M2 | DIASTOLIC BLOOD PRESSURE: 72 MMHG | OXYGEN SATURATION: 91 % | HEART RATE: 94 BPM

## 2022-01-01 VITALS
HEIGHT: 63 IN | OXYGEN SATURATION: 99 % | HEART RATE: 88 BPM | TEMPERATURE: 98 F | SYSTOLIC BLOOD PRESSURE: 146 MMHG | RESPIRATION RATE: 17 BRPM | DIASTOLIC BLOOD PRESSURE: 75 MMHG | WEIGHT: 194.01 LBS

## 2022-01-01 VITALS
RESPIRATION RATE: 17 BRPM | SYSTOLIC BLOOD PRESSURE: 106 MMHG | HEART RATE: 80 BPM | TEMPERATURE: 99 F | OXYGEN SATURATION: 97 % | DIASTOLIC BLOOD PRESSURE: 59 MMHG

## 2022-01-01 VITALS
HEART RATE: 85 BPM | SYSTOLIC BLOOD PRESSURE: 144 MMHG | OXYGEN SATURATION: 98 % | DIASTOLIC BLOOD PRESSURE: 80 MMHG | RESPIRATION RATE: 16 BRPM

## 2022-01-01 VITALS
SYSTOLIC BLOOD PRESSURE: 114 MMHG | TEMPERATURE: 99 F | OXYGEN SATURATION: 95 % | HEART RATE: 95 BPM | HEIGHT: 63 IN | DIASTOLIC BLOOD PRESSURE: 49 MMHG | WEIGHT: 194.01 LBS | RESPIRATION RATE: 16 BRPM

## 2022-01-01 VITALS — BODY MASS INDEX: 34.48 KG/M2 | WEIGHT: 194.6 LBS | HEIGHT: 63 IN

## 2022-01-01 VITALS — HEIGHT: 60.5 IN | BODY MASS INDEX: 36.72 KG/M2 | WEIGHT: 192 LBS

## 2022-01-01 VITALS
WEIGHT: 196 LBS | HEART RATE: 126 BPM | BODY MASS INDEX: 34.73 KG/M2 | HEIGHT: 63 IN | DIASTOLIC BLOOD PRESSURE: 78 MMHG | SYSTOLIC BLOOD PRESSURE: 131 MMHG

## 2022-01-01 VITALS — HEIGHT: 61 IN | WEIGHT: 197 LBS | BODY MASS INDEX: 37.19 KG/M2

## 2022-01-01 VITALS — HEART RATE: 90 BPM | DIASTOLIC BLOOD PRESSURE: 69 MMHG | SYSTOLIC BLOOD PRESSURE: 136 MMHG | OXYGEN SATURATION: 97 %

## 2022-01-01 DIAGNOSIS — Z87.891 PERSONAL HISTORY OF NICOTINE DEPENDENCE: ICD-10-CM

## 2022-01-01 DIAGNOSIS — Z98.890 OTHER SPECIFIED POSTPROCEDURAL STATES: Chronic | ICD-10-CM

## 2022-01-01 DIAGNOSIS — Z98.891 HISTORY OF UTERINE SCAR FROM PREVIOUS SURGERY: Chronic | ICD-10-CM

## 2022-01-01 DIAGNOSIS — M79.89 OTHER SPECIFIED SOFT TISSUE DISORDERS: ICD-10-CM

## 2022-01-01 DIAGNOSIS — J44.9 CHRONIC OBSTRUCTIVE PULMONARY DISEASE, UNSPECIFIED: ICD-10-CM

## 2022-01-01 DIAGNOSIS — Z00.8 ENCOUNTER FOR OTHER GENERAL EXAMINATION: ICD-10-CM

## 2022-01-01 DIAGNOSIS — S21.002S UNSPECIFIED OPEN WOUND OF LEFT BREAST, SEQUELA: ICD-10-CM

## 2022-01-01 DIAGNOSIS — F32.A ANXIETY DISORDER, UNSPECIFIED: ICD-10-CM

## 2022-01-01 DIAGNOSIS — Z01.818 ENCOUNTER FOR OTHER PREPROCEDURAL EXAMINATION: ICD-10-CM

## 2022-01-01 DIAGNOSIS — Z90.49 ACQUIRED ABSENCE OF OTHER SPECIFIED PARTS OF DIGESTIVE TRACT: Chronic | ICD-10-CM

## 2022-01-01 DIAGNOSIS — R60.0 LOCALIZED EDEMA: ICD-10-CM

## 2022-01-01 DIAGNOSIS — Z92.3 PERSONAL HISTORY OF IRRADIATION: ICD-10-CM

## 2022-01-01 DIAGNOSIS — Z85.3 PERSONAL HISTORY OF MALIGNANT NEOPLASM OF BREAST: ICD-10-CM

## 2022-01-01 DIAGNOSIS — N62 HYPERTROPHY OF BREAST: ICD-10-CM

## 2022-01-01 DIAGNOSIS — I10 ESSENTIAL (PRIMARY) HYPERTENSION: ICD-10-CM

## 2022-01-01 DIAGNOSIS — G89.28 OTHER CHRONIC POSTPROCEDURAL PAIN: ICD-10-CM

## 2022-01-01 DIAGNOSIS — Z98.890 OTHER SPECIFIED POSTPROCEDURAL STATES: ICD-10-CM

## 2022-01-01 DIAGNOSIS — R06.02 SHORTNESS OF BREATH: ICD-10-CM

## 2022-01-01 DIAGNOSIS — D17.1 BENIGN LIPOMATOUS NEOPLASM OF SKIN AND SUBCUTANEOUS TISSUE OF TRUNK: ICD-10-CM

## 2022-01-01 DIAGNOSIS — L03.119 CELLULITIS OF UNSPECIFIED PART OF LIMB: ICD-10-CM

## 2022-01-01 DIAGNOSIS — Z74.09 OTHER REDUCED MOBILITY: ICD-10-CM

## 2022-01-01 DIAGNOSIS — R92.8 OTHER ABNORMAL AND INCONCLUSIVE FINDINGS ON DIAGNOSTIC IMAGING OF BREAST: ICD-10-CM

## 2022-01-01 DIAGNOSIS — R14.0 ABDOMINAL DISTENSION (GASEOUS): ICD-10-CM

## 2022-01-01 DIAGNOSIS — S21.002A UNSPECIFIED OPEN WOUND OF LEFT BREAST, INITIAL ENCOUNTER: ICD-10-CM

## 2022-01-01 DIAGNOSIS — L50.9 URTICARIA, UNSPECIFIED: ICD-10-CM

## 2022-01-01 DIAGNOSIS — F41.9 ANXIETY DISORDER, UNSPECIFIED: ICD-10-CM

## 2022-01-01 LAB
ALBUMIN SERPL ELPH-MCNC: 3.3 G/DL — SIGNIFICANT CHANGE UP (ref 3.3–5)
ALP SERPL-CCNC: 72 U/L — SIGNIFICANT CHANGE UP (ref 40–120)
ALT FLD-CCNC: 19 U/L — SIGNIFICANT CHANGE UP (ref 12–78)
ANION GAP SERPL CALC-SCNC: 6 MMOL/L — SIGNIFICANT CHANGE UP (ref 5–17)
ANION GAP SERPL CALC-SCNC: 7 MMOL/L — SIGNIFICANT CHANGE UP (ref 5–17)
AST SERPL-CCNC: 13 U/L — LOW (ref 15–37)
BILIRUB SERPL-MCNC: 0.2 MG/DL — SIGNIFICANT CHANGE UP (ref 0.2–1.2)
BUN SERPL-MCNC: 26 MG/DL — HIGH (ref 7–23)
BUN SERPL-MCNC: 31 MG/DL — HIGH (ref 7–23)
CALCIUM SERPL-MCNC: 9.4 MG/DL — SIGNIFICANT CHANGE UP (ref 8.5–10.1)
CALCIUM SERPL-MCNC: 9.6 MG/DL — SIGNIFICANT CHANGE UP (ref 8.4–10.5)
CHLORIDE SERPL-SCNC: 107 MMOL/L — SIGNIFICANT CHANGE UP (ref 96–108)
CHLORIDE SERPL-SCNC: 107 MMOL/L — SIGNIFICANT CHANGE UP (ref 96–108)
CO2 SERPL-SCNC: 29 MMOL/L — SIGNIFICANT CHANGE UP (ref 22–31)
CO2 SERPL-SCNC: 32 MMOL/L — HIGH (ref 22–31)
CREAT SERPL-MCNC: 1 MG/DL — SIGNIFICANT CHANGE UP (ref 0.5–1.3)
CREAT SERPL-MCNC: 1.03 MG/DL — SIGNIFICANT CHANGE UP (ref 0.5–1.3)
EGFR: 62 ML/MIN/1.73M2 — SIGNIFICANT CHANGE UP
EGFR: 64 ML/MIN/1.73M2 — SIGNIFICANT CHANGE UP
GLUCOSE SERPL-MCNC: 102 MG/DL — HIGH (ref 70–99)
GLUCOSE SERPL-MCNC: 90 MG/DL — SIGNIFICANT CHANGE UP (ref 70–99)
HCT VFR BLD CALC: 40.8 % — SIGNIFICANT CHANGE UP (ref 34.5–45)
HCT VFR BLD CALC: 41.8 % — SIGNIFICANT CHANGE UP (ref 34.5–45)
HGB BLD-MCNC: 13.5 G/DL — SIGNIFICANT CHANGE UP (ref 11.5–15.5)
HGB BLD-MCNC: 13.5 G/DL — SIGNIFICANT CHANGE UP (ref 11.5–15.5)
MCHC RBC-ENTMCNC: 32.1 PG — SIGNIFICANT CHANGE UP (ref 27–34)
MCHC RBC-ENTMCNC: 32.3 GM/DL — SIGNIFICANT CHANGE UP (ref 32–36)
MCHC RBC-ENTMCNC: 32.4 PG — SIGNIFICANT CHANGE UP (ref 27–34)
MCHC RBC-ENTMCNC: 33.1 GM/DL — SIGNIFICANT CHANGE UP (ref 32–36)
MCV RBC AUTO: 97.8 FL — SIGNIFICANT CHANGE UP (ref 80–100)
MCV RBC AUTO: 99.3 FL — SIGNIFICANT CHANGE UP (ref 80–100)
NRBC # BLD: 0 /100 WBCS — SIGNIFICANT CHANGE UP (ref 0–0)
NRBC # BLD: 0 /100 WBCS — SIGNIFICANT CHANGE UP (ref 0–0)
PLATELET # BLD AUTO: 232 K/UL — SIGNIFICANT CHANGE UP (ref 150–400)
PLATELET # BLD AUTO: 246 K/UL — SIGNIFICANT CHANGE UP (ref 150–400)
POTASSIUM SERPL-MCNC: 4.4 MMOL/L — SIGNIFICANT CHANGE UP (ref 3.5–5.3)
POTASSIUM SERPL-MCNC: 4.9 MMOL/L — SIGNIFICANT CHANGE UP (ref 3.5–5.3)
POTASSIUM SERPL-SCNC: 4.4 MMOL/L — SIGNIFICANT CHANGE UP (ref 3.5–5.3)
POTASSIUM SERPL-SCNC: 4.9 MMOL/L — SIGNIFICANT CHANGE UP (ref 3.5–5.3)
PROT SERPL-MCNC: 7.7 G/DL — SIGNIFICANT CHANGE UP (ref 6–8.3)
RBC # BLD: 4.17 M/UL — SIGNIFICANT CHANGE UP (ref 3.8–5.2)
RBC # BLD: 4.21 M/UL — SIGNIFICANT CHANGE UP (ref 3.8–5.2)
RBC # FLD: 13.2 % — SIGNIFICANT CHANGE UP (ref 10.3–14.5)
RBC # FLD: 15 % — HIGH (ref 10.3–14.5)
SARS-COV-2 N GENE NPH QL NAA+PROBE: NOT DETECTED
SODIUM SERPL-SCNC: 142 MMOL/L — SIGNIFICANT CHANGE UP (ref 135–145)
SODIUM SERPL-SCNC: 146 MMOL/L — HIGH (ref 135–145)
SURGICAL PATHOLOGY STUDY: SIGNIFICANT CHANGE UP
WBC # BLD: 6.04 K/UL — SIGNIFICANT CHANGE UP (ref 3.8–10.5)
WBC # BLD: 7.33 K/UL — SIGNIFICANT CHANGE UP (ref 3.8–10.5)
WBC # FLD AUTO: 6.04 K/UL — SIGNIFICANT CHANGE UP (ref 3.8–10.5)
WBC # FLD AUTO: 7.33 K/UL — SIGNIFICANT CHANGE UP (ref 3.8–10.5)

## 2022-01-01 PROCEDURE — 19318 BREAST REDUCTION: CPT | Mod: 50

## 2022-01-01 PROCEDURE — 94010 BREATHING CAPACITY TEST: CPT

## 2022-01-01 PROCEDURE — 71271 CT THORAX LUNG CANCER SCR C-: CPT

## 2022-01-01 PROCEDURE — 85018 HEMOGLOBIN: CPT | Mod: QW

## 2022-01-01 PROCEDURE — 76705 ECHO EXAM OF ABDOMEN: CPT | Mod: 26

## 2022-01-01 PROCEDURE — 99024 POSTOP FOLLOW-UP VISIT: CPT

## 2022-01-01 PROCEDURE — 80053 COMPREHEN METABOLIC PANEL: CPT

## 2022-01-01 PROCEDURE — 85027 COMPLETE CBC AUTOMATED: CPT

## 2022-01-01 PROCEDURE — 99215 OFFICE O/P EST HI 40 MIN: CPT | Mod: 25

## 2022-01-01 PROCEDURE — 88304 TISSUE EXAM BY PATHOLOGIST: CPT | Mod: 26

## 2022-01-01 PROCEDURE — 99214 OFFICE O/P EST MOD 30 MIN: CPT

## 2022-01-01 PROCEDURE — 93970 EXTREMITY STUDY: CPT

## 2022-01-01 PROCEDURE — 93010 ELECTROCARDIOGRAM REPORT: CPT | Mod: NC

## 2022-01-01 PROCEDURE — 94727 GAS DIL/WSHOT DETER LNG VOL: CPT

## 2022-01-01 PROCEDURE — 93000 ELECTROCARDIOGRAM COMPLETE: CPT

## 2022-01-01 PROCEDURE — 36415 COLL VENOUS BLD VENIPUNCTURE: CPT

## 2022-01-01 PROCEDURE — 88305 TISSUE EXAM BY PATHOLOGIST: CPT

## 2022-01-01 PROCEDURE — 99212 OFFICE O/P EST SF 10 MIN: CPT

## 2022-01-01 PROCEDURE — 74176 CT ABD & PELVIS W/O CONTRAST: CPT | Mod: 26

## 2022-01-01 PROCEDURE — 97605 NEG PRS WND THER DME<=50SQCM: CPT

## 2022-01-01 PROCEDURE — 93010 ELECTROCARDIOGRAM REPORT: CPT

## 2022-01-01 PROCEDURE — 99213 OFFICE O/P EST LOW 20 MIN: CPT

## 2022-01-01 PROCEDURE — G0463: CPT

## 2022-01-01 PROCEDURE — 93306 TTE W/DOPPLER COMPLETE: CPT

## 2022-01-01 PROCEDURE — 80048 BASIC METABOLIC PNL TOTAL CA: CPT

## 2022-01-01 PROCEDURE — 21931 EXC BACK LES SC 3 CM/>: CPT

## 2022-01-01 PROCEDURE — 99214 OFFICE O/P EST MOD 30 MIN: CPT | Mod: 25

## 2022-01-01 PROCEDURE — 93005 ELECTROCARDIOGRAM TRACING: CPT

## 2022-01-01 PROCEDURE — 71271 CT THORAX LUNG CANCER SCR C-: CPT | Mod: 26

## 2022-01-01 PROCEDURE — XXXXX: CPT

## 2022-01-01 PROCEDURE — C1889: CPT

## 2022-01-01 PROCEDURE — 88304 TISSUE EXAM BY PATHOLOGIST: CPT

## 2022-01-01 PROCEDURE — 94640 AIRWAY INHALATION TREATMENT: CPT

## 2022-01-01 PROCEDURE — 94729 DIFFUSING CAPACITY: CPT

## 2022-01-01 PROCEDURE — 88305 TISSUE EXAM BY PATHOLOGIST: CPT | Mod: 26

## 2022-01-01 PROCEDURE — 93970 EXTREMITY STUDY: CPT | Mod: 26

## 2022-01-01 PROCEDURE — 76705 ECHO EXAM OF ABDOMEN: CPT

## 2022-01-01 PROCEDURE — 74176 CT ABD & PELVIS W/O CONTRAST: CPT

## 2022-01-01 DEVICE — CLIP APPLIER ETHICON LIGACLIP 9 3/8" SMALL: Type: IMPLANTABLE DEVICE | Site: BILATERAL | Status: FUNCTIONAL

## 2022-01-01 DEVICE — CLIP APPLIER ETHICON LIGACLIP 11.5" MEDIUM: Type: IMPLANTABLE DEVICE | Site: BILATERAL | Status: FUNCTIONAL

## 2022-01-01 RX ORDER — OXYCODONE HYDROCHLORIDE 5 MG/1
5 TABLET ORAL EVERY 4 HOURS
Refills: 0 | Status: DISCONTINUED | OUTPATIENT
Start: 2022-01-01 | End: 2022-01-01

## 2022-01-01 RX ORDER — IPRATROPIUM/ALBUTEROL SULFATE 18-103MCG
3 AEROSOL WITH ADAPTER (GRAM) INHALATION ONCE
Refills: 0 | Status: COMPLETED | OUTPATIENT
Start: 2022-01-01 | End: 2022-01-01

## 2022-01-01 RX ORDER — OXYCODONE AND ACETAMINOPHEN 5; 325 MG/1; MG/1
5-325 TABLET ORAL
Qty: 24 | Refills: 0 | Status: DISCONTINUED | COMMUNITY
Start: 2022-01-01 | End: 2022-01-01

## 2022-01-01 RX ORDER — CEFAZOLIN SODIUM 1 G
2000 VIAL (EA) INJECTION ONCE
Refills: 0 | Status: COMPLETED | OUTPATIENT
Start: 2022-01-01 | End: 2022-01-01

## 2022-01-01 RX ORDER — SODIUM HYPOCHLORITE 1.25 MG/ML
0.12 SOLUTION TOPICAL TWICE DAILY
Qty: 1 | Refills: 0 | Status: DISCONTINUED | COMMUNITY
Start: 2022-01-01 | End: 2022-01-01

## 2022-01-01 RX ORDER — METOPROLOL TARTRATE 50 MG
0 TABLET ORAL
Qty: 0 | Refills: 0 | DISCHARGE

## 2022-01-01 RX ORDER — ONDANSETRON 8 MG/1
4 TABLET, FILM COATED ORAL ONCE
Refills: 0 | Status: DISCONTINUED | OUTPATIENT
Start: 2022-01-01 | End: 2022-01-01

## 2022-01-01 RX ORDER — CYCLOBENZAPRINE HYDROCHLORIDE 10 MG/1
10 TABLET, FILM COATED ORAL
Qty: 30 | Refills: 0 | Status: DISCONTINUED | COMMUNITY
Start: 2022-02-07 | End: 2022-01-01

## 2022-01-01 RX ORDER — IBUPROFEN 200 MG
1 TABLET ORAL
Qty: 0 | Refills: 0 | DISCHARGE

## 2022-01-01 RX ORDER — AMLODIPINE BESYLATE AND BENAZEPRIL HYDROCHLORIDE 10; 20 MG/1; MG/1
0 CAPSULE ORAL
Qty: 0 | Refills: 0 | DISCHARGE

## 2022-01-01 RX ORDER — HYDROXYZINE HYDROCHLORIDE 50 MG/1
50 TABLET ORAL AT BEDTIME
Qty: 10 | Refills: 0 | Status: ACTIVE | COMMUNITY
Start: 2022-01-01 | End: 1900-01-01

## 2022-01-01 RX ORDER — GABAPENTIN 300 MG/1
300 CAPSULE ORAL
Qty: 30 | Refills: 0 | Status: DISCONTINUED | COMMUNITY
Start: 2021-10-28 | End: 2022-01-01

## 2022-01-01 RX ORDER — TRAZODONE HCL 50 MG
1.5 TABLET ORAL
Qty: 0 | Refills: 0 | DISCHARGE

## 2022-01-01 RX ORDER — ESCITALOPRAM OXALATE 20 MG/1
20 TABLET ORAL DAILY
Qty: 30 | Refills: 2 | Status: DISCONTINUED | COMMUNITY
Start: 2020-07-14 | End: 2022-01-01

## 2022-01-01 RX ORDER — METOPROLOL TARTRATE 50 MG
2 TABLET ORAL
Qty: 0 | Refills: 0 | DISCHARGE

## 2022-01-01 RX ORDER — OXYCODONE HYDROCHLORIDE 5 MG/1
0 TABLET ORAL
Qty: 0 | Refills: 0 | DISCHARGE

## 2022-01-01 RX ORDER — ACETAMINOPHEN 500 MG
2 TABLET ORAL
Qty: 0 | Refills: 0 | DISCHARGE

## 2022-01-01 RX ORDER — NYSTATIN 100000 [USP'U]/ML
100000 SUSPENSION ORAL 3 TIMES DAILY
Qty: 1 | Refills: 2 | Status: DISCONTINUED | COMMUNITY
Start: 2021-03-05 | End: 2022-01-01

## 2022-01-01 RX ORDER — ONDANSETRON 4 MG/1
4 TABLET, ORALLY DISINTEGRATING ORAL
Qty: 9 | Refills: 0 | Status: DISCONTINUED | COMMUNITY
Start: 2022-01-01 | End: 2022-01-01

## 2022-01-01 RX ORDER — ACETAMINOPHEN 500 MG
650 TABLET ORAL ONCE
Refills: 0 | Status: DISCONTINUED | OUTPATIENT
Start: 2022-01-01 | End: 2022-01-01

## 2022-01-01 RX ORDER — CEFADROXIL 500 MG/1
500 CAPSULE ORAL TWICE DAILY
Qty: 14 | Refills: 0 | Status: DISCONTINUED | COMMUNITY
Start: 2022-01-01 | End: 2022-01-01

## 2022-01-01 RX ORDER — METOPROLOL TARTRATE 50 MG
1 TABLET ORAL
Qty: 0 | Refills: 0 | DISCHARGE

## 2022-01-01 RX ORDER — OXYCODONE HYDROCHLORIDE 5 MG/1
10 TABLET ORAL EVERY 6 HOURS
Refills: 0 | Status: DISCONTINUED | OUTPATIENT
Start: 2022-01-01 | End: 2022-01-01

## 2022-01-01 RX ORDER — TRAZODONE HYDROCHLORIDE 50 MG/1
50 TABLET ORAL DAILY
Qty: 45 | Refills: 2 | Status: DISCONTINUED | COMMUNITY
Start: 2017-09-05 | End: 2022-01-01

## 2022-01-01 RX ORDER — FUROSEMIDE 40 MG/1
40 TABLET ORAL
Refills: 0 | Status: ACTIVE | COMMUNITY

## 2022-01-01 RX ORDER — ALBUTEROL SULFATE 90 UG/1
108 (90 BASE) AEROSOL, METERED RESPIRATORY (INHALATION)
Qty: 3 | Refills: 3 | Status: ACTIVE | COMMUNITY
Start: 2017-01-24 | End: 1900-01-01

## 2022-01-01 RX ORDER — CHOLECALCIFEROL (VITAMIN D3) 125 MCG
1 CAPSULE ORAL
Qty: 0 | Refills: 0 | DISCHARGE

## 2022-01-01 RX ORDER — ALPRAZOLAM 0.25 MG
1 TABLET ORAL
Qty: 0 | Refills: 0 | DISCHARGE

## 2022-01-01 RX ORDER — SODIUM CHLORIDE 9 MG/ML
1000 INJECTION, SOLUTION INTRAVENOUS
Refills: 0 | Status: DISCONTINUED | OUTPATIENT
Start: 2022-01-01 | End: 2022-01-01

## 2022-01-01 RX ORDER — ALBUTEROL 90 UG/1
2 AEROSOL, METERED ORAL
Qty: 0 | Refills: 0 | DISCHARGE

## 2022-01-01 RX ORDER — HYDROMORPHONE HYDROCHLORIDE 2 MG/ML
0.5 INJECTION INTRAMUSCULAR; INTRAVENOUS; SUBCUTANEOUS ONCE
Refills: 0 | Status: DISCONTINUED | OUTPATIENT
Start: 2022-01-01 | End: 2022-01-01

## 2022-01-01 RX ORDER — EPINEPHRINE 0.3 MG/.3ML
0 INJECTION INTRAMUSCULAR; SUBCUTANEOUS
Qty: 0 | Refills: 0 | DISCHARGE

## 2022-01-01 RX ORDER — ALBUTEROL 90 UG/1
3 AEROSOL, METERED ORAL
Qty: 0 | Refills: 0 | DISCHARGE

## 2022-01-01 RX ORDER — LORATADINE 10 MG/1
0 TABLET ORAL
Qty: 0 | Refills: 0 | DISCHARGE

## 2022-01-01 RX ORDER — FLUTICASONE FUROATE, UMECLIDINIUM BROMIDE AND VILANTEROL TRIFENATATE 200; 62.5; 25 UG/1; UG/1; UG/1
1 POWDER RESPIRATORY (INHALATION)
Qty: 0 | Refills: 0 | DISCHARGE

## 2022-01-01 RX ORDER — PREGABALIN 50 MG/1
50 CAPSULE ORAL 3 TIMES DAILY
Qty: 42 | Refills: 0 | Status: DISCONTINUED | COMMUNITY
Start: 2022-01-01 | End: 2022-01-01

## 2022-01-01 RX ORDER — CIPROFLOXACIN HYDROCHLORIDE 500 MG/1
500 TABLET, FILM COATED ORAL TWICE DAILY
Qty: 14 | Refills: 0 | Status: DISCONTINUED | COMMUNITY
Start: 2022-01-01 | End: 2022-01-01

## 2022-01-01 RX ORDER — ACETAMINOPHEN 500 MG
1000 TABLET ORAL ONCE
Refills: 0 | Status: COMPLETED | OUTPATIENT
Start: 2022-01-01 | End: 2022-01-01

## 2022-01-01 RX ORDER — UMECLIDINIUM BROMIDE AND VILANTEROL TRIFENATATE 62.5; 25 UG/1; UG/1
0 POWDER RESPIRATORY (INHALATION)
Qty: 0 | Refills: 0 | DISCHARGE

## 2022-01-01 RX ORDER — ERGOCALCIFEROL 1.25 MG/1
0 CAPSULE ORAL
Qty: 0 | Refills: 0 | DISCHARGE

## 2022-01-01 RX ORDER — CEPHALEXIN 500 MG/1
500 CAPSULE ORAL
Qty: 14 | Refills: 0 | Status: DISCONTINUED | COMMUNITY
Start: 2022-01-01 | End: 2022-01-01

## 2022-01-01 RX ORDER — UMECLIDINIUM BROMIDE AND VILANTEROL TRIFENATATE 62.5; 25 UG/1; UG/1
1 POWDER RESPIRATORY (INHALATION)
Qty: 0 | Refills: 0 | DISCHARGE

## 2022-01-01 RX ORDER — IPRATROPIUM/ALBUTEROL SULFATE 18-103MCG
0 AEROSOL WITH ADAPTER (GRAM) INHALATION
Qty: 0 | Refills: 0 | DISCHARGE

## 2022-01-01 RX ORDER — IPRATROPIUM BROMIDE AND ALBUTEROL SULFATE 2.5; .5 MG/3ML; MG/3ML
0.5-2.5 (3) SOLUTION RESPIRATORY (INHALATION)
Qty: 1080 | Refills: 1 | Status: ACTIVE | COMMUNITY
Start: 2020-06-26 | End: 1900-01-01

## 2022-01-01 RX ORDER — OXYCODONE AND ACETAMINOPHEN 5; 325 MG/1; MG/1
5-325 TABLET ORAL
Qty: 10 | Refills: 0 | Status: DISCONTINUED | COMMUNITY
Start: 2022-01-01 | End: 2022-01-01

## 2022-01-01 RX ORDER — PREGABALIN 225 MG/1
1 CAPSULE ORAL
Qty: 0 | Refills: 0 | DISCHARGE

## 2022-01-01 RX ORDER — TAMOXIFEN CITRATE 20 MG/1
1 TABLET, FILM COATED ORAL
Qty: 0 | Refills: 0 | DISCHARGE

## 2022-01-01 RX ORDER — LIDOCAINE 5 G/100G
5 OINTMENT TOPICAL
Qty: 1 | Refills: 1 | Status: DISCONTINUED | COMMUNITY
Start: 2021-01-06 | End: 2022-01-01

## 2022-01-01 RX ORDER — PREDNISOLONE ACETATE 10 MG/ML
1 SUSPENSION/ DROPS OPHTHALMIC
Qty: 5 | Refills: 0 | Status: DISCONTINUED | COMMUNITY
Start: 2021-09-28 | End: 2022-01-01

## 2022-01-01 RX ORDER — TRAMADOL HYDROCHLORIDE 50 MG/1
1 TABLET ORAL
Qty: 0 | Refills: 0 | DISCHARGE

## 2022-01-01 RX ORDER — AMLODIPINE BESYLATE AND BENAZEPRIL HYDROCHLORIDE 10; 20 MG/1; MG/1
1 CAPSULE ORAL
Qty: 0 | Refills: 0 | DISCHARGE

## 2022-01-01 RX ORDER — HYDROMORPHONE HYDROCHLORIDE 2 MG/ML
0.5 INJECTION INTRAMUSCULAR; INTRAVENOUS; SUBCUTANEOUS
Refills: 0 | Status: DISCONTINUED | OUTPATIENT
Start: 2022-01-01 | End: 2022-01-01

## 2022-01-01 RX ORDER — SODIUM CHLORIDE 50 MG/ML
5 SOLUTION/ DROPS OPHTHALMIC
Qty: 15 | Refills: 0 | Status: DISCONTINUED | COMMUNITY
Start: 2021-09-28 | End: 2022-01-01

## 2022-01-01 RX ORDER — TAMOXIFEN CITRATE 20 MG/1
0 TABLET, FILM COATED ORAL
Qty: 0 | Refills: 0 | DISCHARGE

## 2022-01-01 RX ORDER — ALPRAZOLAM 0.25 MG
0 TABLET ORAL
Qty: 0 | Refills: 0 | DISCHARGE

## 2022-01-01 RX ORDER — OXYCODONE HYDROCHLORIDE 5 MG/1
10 TABLET ORAL EVERY 4 HOURS
Refills: 0 | Status: DISCONTINUED | OUTPATIENT
Start: 2022-01-01 | End: 2022-01-01

## 2022-01-01 RX ORDER — NICOTINE 4 MG/1
10 INHALANT RESPIRATORY (INHALATION)
Qty: 1 | Refills: 2 | Status: DISCONTINUED | COMMUNITY
Start: 2019-08-28 | End: 2022-01-01

## 2022-01-01 RX ORDER — OXYCODONE HYDROCHLORIDE 5 MG/1
5 CAPSULE ORAL EVERY 6 HOURS
Qty: 10 | Refills: 0 | Status: DISCONTINUED | COMMUNITY
Start: 2022-01-01 | End: 2022-01-01

## 2022-01-01 RX ORDER — ACETAMINOPHEN 500 MG
650 TABLET ORAL EVERY 6 HOURS
Refills: 0 | Status: DISCONTINUED | OUTPATIENT
Start: 2022-01-01 | End: 2022-01-01

## 2022-01-01 RX ORDER — PREDNISONE 5 MG/1
5 TABLET ORAL
Qty: 42 | Refills: 4 | Status: DISCONTINUED | COMMUNITY
Start: 2022-01-01 | End: 2022-01-01

## 2022-01-01 RX ORDER — LORATADINE 10 MG/1
1 TABLET ORAL
Qty: 0 | Refills: 0 | DISCHARGE

## 2022-01-01 RX ORDER — DOXEPIN HCL 100 MG
0 CAPSULE ORAL
Qty: 0 | Refills: 0 | DISCHARGE

## 2022-01-01 RX ORDER — TRAZODONE HCL 50 MG
0 TABLET ORAL
Qty: 0 | Refills: 0 | DISCHARGE

## 2022-01-01 RX ORDER — TRAZODONE HYDROCHLORIDE 100 MG/1
100 TABLET ORAL
Refills: 0 | Status: ACTIVE | COMMUNITY
Start: 2021-09-23

## 2022-01-01 RX ADMIN — Medication 3 MILLILITER(S): at 13:18

## 2022-01-01 RX ADMIN — SODIUM CHLORIDE 50 MILLILITER(S): 9 INJECTION, SOLUTION INTRAVENOUS at 06:31

## 2022-01-01 RX ADMIN — Medication 400 MILLIGRAM(S): at 14:59

## 2022-01-01 RX ADMIN — HYDROMORPHONE HYDROCHLORIDE 0.5 MILLIGRAM(S): 2 INJECTION INTRAMUSCULAR; INTRAVENOUS; SUBCUTANEOUS at 11:35

## 2022-01-01 RX ADMIN — HYDROMORPHONE HYDROCHLORIDE 0.5 MILLIGRAM(S): 2 INJECTION INTRAMUSCULAR; INTRAVENOUS; SUBCUTANEOUS at 11:39

## 2022-01-10 NOTE — H&P PST ADULT - PROBLEM/PLAN-3
[address] THERAPY    Date: 1/10/2022  Patient Name: Lynnette Waters        MRN: [id number]    Account #: [de-identified]  : 2014  (9 y.o.)  Gender: female     REASON FOR MISSED TREATMENT:    []Cancel due to [address] pandemic  [x]Cancelled due to illness. [] Therapist Canceled Appointment  []Cancelled due to other appointment   [] Cancelled due to transportation conflict  []Cancelled due to weather  []Frequency of order changed  []Patient on hold due to:   [] Excused absence d/t at least 48 hour notice of cancellation  []Cancel /less than 48 hour notice. []No Show / No call. [] Pt's guardian/parent called /confirmed next scheduled appointment.   [] Left message for guardian/parent regarding missed appointment and date/time of next scheduled appointment.  []OTHER:        Electronically signed by: Isai Gil M.Ed, OTR/L      Date:1/10/2022
DISPLAY PLAN FREE TEXT

## 2022-01-11 ENCOUNTER — APPOINTMENT (OUTPATIENT)
Dept: RADIATION ONCOLOGY | Facility: CLINIC | Age: 62
End: 2022-01-11
Payer: MEDICAID

## 2022-01-11 DIAGNOSIS — F31.9 BIPOLAR DISORDER, UNSPECIFIED: ICD-10-CM

## 2022-01-11 PROCEDURE — 99442: CPT

## 2022-01-13 PROBLEM — F31.9 BIPOLAR DEPRESSION: Status: ACTIVE | Noted: 2020-07-14

## 2022-01-13 NOTE — ASSESSMENT
[No evidence of disease] : No evidence of disease [FreeTextEntry1] : Emotional distress likely related to physical and mental health issues. No treatment related related sequelae per se.

## 2022-01-13 NOTE — DISEASE MANAGEMENT
[Pathological] : TNM Stage: p [IA] : IA [FreeTextEntry4] : invasive lobular carcinoma [TTNM] : 1c [NTNM] : 0 [MTNM] : 0 [de-identified] : 9801 cGy [de-identified] : left breast

## 2022-01-13 NOTE — HISTORY OF PRESENT ILLNESS
[Home] : at home, [unfilled] , at the time of the visit. [Medical Office: ([facility])___] : at the medical office located in  [Verbal consent obtained from patient] : the patient, [unfilled] [FreeTextEntry1] : This 61 year-old woman is conferencing for routine follow up.  Ms. Hill has a history of invasive lobular carcinoma, ER+, [place] negative, HER2 negative, [id number] of the left breast.  She is s/p left breast lumpectomy in December, 2018 with Dr. Farias.  She received 4240 cGy radiation therapy to the left breast, completed on 4/18/2019.\par \par At last visit:\par Reported being maintained on Tamoxifen.  Per Dr. Goldstein she was partially compliant with hormonal therapy. She also complained of being very anxious.\par \par Follows with Dr. Goldstein.  Ms. Raphael  was interested in undergoing b/l mastectomy with limited  reconstruction, and oncoplastic closure.  She consulted with to Dr. Lao who advised her to stop smoking and get back pain under control, and that pulmonary clearance would be needed.  She informed me that she "fired" Dr. Lao and would seek a different plastic surgeon.\par \par On exam, left breast was slightly smaller, firmer, and heavier than right.\par \par Interval history is remarkable for Cholecystectomy and abdominal hernia repair per Dr. Marc Finkelstein.\par \par Interim history:\par Ms. Hill has consulted with Dr. Shikowitz-Behr, Plastic Surgeon.  Per Dr. Shikowitz-Behr, patient has an interest in breast reduction surgery.\par \par \par

## 2022-01-13 NOTE — LETTER CLOSING
[Sincerely yours,] : Sincerely yours, [FreeTextEntry3] : Roland Fuentes MD\par Physician in Chief\par Department of Radiation Medicine\par [facility] [place]\par [facility] [place]\par \par  of Radiation Medicine\par [facility]\par at  [facility]\par \par Radiation \par [facility]/\par [facility] [place]\par [address]\par [place], [place] [address]\par \par Tel: [phone]\par Fax: [phone]\par

## 2022-01-13 NOTE — VITALS
[80: Normal activity with effort; some signs or symptoms of disease.] : 80: Normal activity with effort; some signs or symptoms of disease.  [Maximal Pain Intensity: 6/10] : 6/10 [Least Pain Intensity: 2/10] : 2/10 [Pain Location: ___] : Pain Location: [unfilled] [Adjuvant (neuroleptics)] : adjuvant (neuroleptics) [ECOG Performance Status: 1 - Restricted in physically strenuous activity but ambulatory and able to carry out work of a light or sedentary nature] : Performance Status: 1 - Restricted in physically strenuous activity but ambulatory and able to carry out work of a light or sedentary nature, e.g., light house work, office work

## 2022-01-13 NOTE — REVIEW OF SYSTEMS
[Joint Pain] : joint pain [Muscle Pain] : muscle pain [Anxiety] : anxiety [Depression] : depression [Negative] : Allergic/Immunologic [FreeTextEntry9] : chronic [de-identified] : chronic tenderness  and swelling of left breast

## 2022-01-18 ENCOUNTER — RX RENEWAL (OUTPATIENT)
Age: 62
End: 2022-01-18

## 2022-01-19 ENCOUNTER — RX RENEWAL (OUTPATIENT)
Age: 62
End: 2022-01-19

## 2022-02-07 ENCOUNTER — APPOINTMENT (OUTPATIENT)
Dept: ULTRASOUND IMAGING | Facility: CLINIC | Age: 62
End: 2022-02-07
Payer: MEDICAID

## 2022-02-07 ENCOUNTER — RESULT REVIEW (OUTPATIENT)
Age: 62
End: 2022-02-07

## 2022-02-07 ENCOUNTER — OUTPATIENT (OUTPATIENT)
Dept: OUTPATIENT SERVICES | Facility: HOSPITAL | Age: 62
LOS: 1 days | Discharge: HOME | End: 2022-02-07

## 2022-02-07 ENCOUNTER — APPOINTMENT (OUTPATIENT)
Dept: MAMMOGRAPHY | Facility: CLINIC | Age: 62
End: 2022-02-07
Payer: MEDICAID

## 2022-02-07 ENCOUNTER — OUTPATIENT (OUTPATIENT)
Dept: OUTPATIENT SERVICES | Facility: HOSPITAL | Age: 62
LOS: 1 days | End: 2022-02-07
Payer: COMMERCIAL

## 2022-02-07 DIAGNOSIS — Z85.3 PERSONAL HISTORY OF MALIGNANT NEOPLASM OF BREAST: ICD-10-CM

## 2022-02-07 DIAGNOSIS — Z90.49 ACQUIRED ABSENCE OF OTHER SPECIFIED PARTS OF DIGESTIVE TRACT: Chronic | ICD-10-CM

## 2022-02-07 DIAGNOSIS — Z98.891 HISTORY OF UTERINE SCAR FROM PREVIOUS SURGERY: Chronic | ICD-10-CM

## 2022-02-07 DIAGNOSIS — Z98.890 OTHER SPECIFIED POSTPROCEDURAL STATES: Chronic | ICD-10-CM

## 2022-02-07 DIAGNOSIS — C50.919 MALIGNANT NEOPLASM OF UNSPECIFIED SITE OF UNSPECIFIED FEMALE BREAST: ICD-10-CM

## 2022-02-07 PROCEDURE — 77066 DX MAMMO INCL CAD BI: CPT

## 2022-02-07 PROCEDURE — G0279: CPT | Mod: 26

## 2022-02-07 PROCEDURE — 77066 DX MAMMO INCL CAD BI: CPT | Mod: 26

## 2022-02-07 PROCEDURE — G0279: CPT

## 2022-02-10 ENCOUNTER — APPOINTMENT (OUTPATIENT)
Dept: HEMATOLOGY ONCOLOGY | Facility: CLINIC | Age: 62
End: 2022-02-10
Payer: MEDICAID

## 2022-02-10 PROCEDURE — 99442: CPT

## 2022-02-11 NOTE — ASSESSMENT
[FreeTextEntry1] : History of stage T1c N0 invasive ductal breast cancer, ER positive, status post lumpectomy and radiation therapy with postoperative hormonal therapy.\par No evidence of recurrent disease.\par Because of significant bilateral breast pain, bilateral mastectomy is being considered.\par Nothing further to add from medical oncology standpoint.\par \par 15 minutes were spent reviewing past history, discussing current clinical status, and planning future care.

## 2022-02-11 NOTE — REVIEW OF SYSTEMS
[Anxiety] : anxiety [Negative] : Constitutional [FreeTextEntry9] : Bilateral burning discomfort in both breasts

## 2022-02-11 NOTE — REASON FOR VISIT
[Home] : at home, [unfilled] , at the time of the visit. [Medical Office: ([facility])___] : at the medical office located in  [Verbal consent obtained from patient] : the patient, [unfilled] [Follow-Up Visit] : a follow-up [FreeTextEntry2] : Breast cancer

## 2022-02-11 NOTE — HISTORY OF PRESENT ILLNESS
[de-identified] : 61-year-old woman underwent needle localized left lumpectomy in October 2018, with reexcision due to a close margin.  Surgical stage was T1c N0, and ultimate treatment consisted of lumpectomy, followed by radiation and hormonal therapy given her estrogen receptor positive status.  She was partially compliant with hormonal therapy.\par She now complains of a burning pain in the breast for which bilateral mastectomy is being considered, and plastic surgery is following as well.\par  [de-identified] : There is been no evidence of recurrence.

## 2022-02-14 ENCOUNTER — APPOINTMENT (OUTPATIENT)
Dept: PLASTIC SURGERY | Facility: CLINIC | Age: 62
End: 2022-02-14
Payer: MEDICAID

## 2022-02-14 PROCEDURE — 99072 ADDL SUPL MATRL&STAF TM PHE: CPT

## 2022-02-14 PROCEDURE — 99214 OFFICE O/P EST MOD 30 MIN: CPT

## 2022-02-18 NOTE — REVIEW OF SYSTEMS
[As Noted in HPI] : as noted in HPI [Negative] : Constitutional [Fever] : no fever [Chills] : no chills

## 2022-02-18 NOTE — PHYSICAL EXAM
[NI] : Normal [de-identified] : NAD, AxOx3 [de-identified] : Bilateral grade 3 ptosis, Right breast larger than L. Well healed lumpectomy scar of left breast. \par \par Right:\par SN-N 36.5\par N-IMF 14\par BW 18\par \par Left:\par SN-N 37\par N-IMF 16\par BW 16

## 2022-02-18 NOTE — HISTORY OF PRESENT ILLNESS
[FreeTextEntry1] : DEX LOPEZ is a 60 year F who returns to further discuss breast reduction surgery. \par \par  She has a complicated PMH including but not limited to COPD, HLD, chronic back pain, anxiety and depression, PTSD, and left breast cancer s/p lumpectomy and radiation in 2018. She is also a former smoker and states she continues to smoke cigarettes occasionally, and smoke marijuana regularly.\par \par To reiterate her past history, she states that since the time of her lumpectomy and radiation she has experienced pain in the left breast. She in uncomfortable in a bra and feels this is debilitating. She also suffers from neck and shoulder pain as well as chronic back pain. During out last visit we discussed the option of undergoing breast reduction surgery to help alleviate her neck, back, and shoulder pain, although this will not eliminate the left breast pain due to her prior surgery and radiation. \par \par

## 2022-02-24 ENCOUNTER — OUTPATIENT (OUTPATIENT)
Dept: OUTPATIENT SERVICES | Facility: HOSPITAL | Age: 62
LOS: 1 days | End: 2022-02-24
Payer: COMMERCIAL

## 2022-02-24 ENCOUNTER — RESULT REVIEW (OUTPATIENT)
Age: 62
End: 2022-02-24

## 2022-02-24 ENCOUNTER — APPOINTMENT (OUTPATIENT)
Dept: ULTRASOUND IMAGING | Facility: CLINIC | Age: 62
End: 2022-02-24
Payer: MEDICAID

## 2022-02-24 DIAGNOSIS — Z85.3 PERSONAL HISTORY OF MALIGNANT NEOPLASM OF BREAST: ICD-10-CM

## 2022-02-24 DIAGNOSIS — Z90.49 ACQUIRED ABSENCE OF OTHER SPECIFIED PARTS OF DIGESTIVE TRACT: Chronic | ICD-10-CM

## 2022-02-24 DIAGNOSIS — Z98.890 OTHER SPECIFIED POSTPROCEDURAL STATES: Chronic | ICD-10-CM

## 2022-02-24 DIAGNOSIS — Z98.891 HISTORY OF UTERINE SCAR FROM PREVIOUS SURGERY: Chronic | ICD-10-CM

## 2022-02-24 DIAGNOSIS — Z00.00 ENCOUNTER FOR GENERAL ADULT MEDICAL EXAMINATION WITHOUT ABNORMAL FINDINGS: ICD-10-CM

## 2022-02-24 PROCEDURE — 76641 ULTRASOUND BREAST COMPLETE: CPT | Mod: 26,50

## 2022-02-24 PROCEDURE — 76641 ULTRASOUND BREAST COMPLETE: CPT

## 2022-03-02 ENCOUNTER — APPOINTMENT (OUTPATIENT)
Dept: RADIATION ONCOLOGY | Facility: CLINIC | Age: 62
End: 2022-03-02
Payer: MEDICAID

## 2022-03-02 DIAGNOSIS — F41.9 ANXIETY DISORDER, UNSPECIFIED: ICD-10-CM

## 2022-03-02 DIAGNOSIS — M54.9 DORSALGIA, UNSPECIFIED: ICD-10-CM

## 2022-03-02 PROCEDURE — 99212 OFFICE O/P EST SF 10 MIN: CPT

## 2022-03-03 PROBLEM — M54.9 BACK PAIN: Status: ACTIVE | Noted: 2017-10-18

## 2022-03-03 PROBLEM — F41.9 ATYPICAL ANXIETY DISORDER: Status: ACTIVE | Noted: 2017-08-01

## 2022-03-03 NOTE — VITALS
[80: Normal activity with effort; some signs or symptoms of disease.] : 80: Normal activity with effort; some signs or symptoms of disease.  [ECOG Performance Status: 1 - Restricted in physically strenuous activity but ambulatory and able to carry out work of a light or sedentary nature] : Performance Status: 1 - Restricted in physically strenuous activity but ambulatory and able to carry out work of a light or sedentary nature, e.g., light house work, office work [Maximal Pain Intensity: 5/10] : 5/10 [Least Pain Intensity: 1/10] : 1/10 [Pain Location: ___] : Pain Location: [unfilled] [OTC] : OTC [Adjuvant (neuroleptics)] : adjuvant (neuroleptics)

## 2022-03-03 NOTE — DISEASE MANAGEMENT
[Pathological] : TNM Stage: p [IA] : IA [FreeTextEntry4] : invasive lobular carcinoma [TTNM] : 1c [NTNM] : 0 [MTNM] : 0 [de-identified] : 8855 cGy [de-identified] : left breast

## 2022-03-03 NOTE — REVIEW OF SYSTEMS
[Joint Pain] : joint pain [Muscle Pain] : muscle pain [Anxiety] : anxiety [Depression] : depression [Negative] : Allergic/Immunologic [FreeTextEntry6] : COPD [FreeTextEntry9] : chronic [de-identified] : chronic tenderness and swelling of left breast

## 2022-03-03 NOTE — HISTORY OF PRESENT ILLNESS
[Home] : at home, [unfilled] , at the time of the visit. [Medical Office: ([facility])___] : at the medical office located in  [Verbal consent obtained from patient] : the patient, [unfilled] [FreeTextEntry1] : \par \par This 61 year-old woman is conferencing for routine follow up.  Ms. Hill has a history of invasive lobular carcinoma, ER+, GA negative, HER2 negative, tW1xV0W5 of the left breast.  She is s/p left breast lumpectomy in December, 2018 with Dr. Farias.  She received 4240 cGy radiation therapy to the left breast, completed on 4/18/2019.\par \par Patient reported being maintained on Tamoxifen.  Per Dr. Goldstein she was partially compliant with hormonal therapy. \par \par Ms. Hill voiced interest in undergoing b/l mastectomy with limited reconstruction, and oncoplastic closure.  She consulted with Dr. Lao who advised her to stop smoking and get back pain under control, and that pulmonary clearance would be needed.  She informed me that she "fired" Dr. Lao and would seek a different plastic surgeon.\par \par More recently, Ms. Hill consulted with Dr. Shikowitz-Behr, Plastic Surgeon.  Per Dr. Shikowitz-Behr, patient has an interest in breast reduction surgery.\par \par Interval history is remarkable for Cholecystectomy and abdominal hernia repair per Dr. Marc Finkelstein.\par \par \par Ms. Hill has consulted with Dr. Shikowitz-Behr, Plastic Surgeon.  Per Dr. Shikowitz-Behr, patient has an interest in breast reduction surgery.\par \par \par

## 2022-03-03 NOTE — LETTER CLOSING
[Sincerely yours,] : Sincerely yours, [FreeTextEntry3] : Roland Fuentes MD\par Physician in Chief\par Department of Radiation Medicine\par [facility]\par [facility] [place]\par \par  of Radiation Medicine\par [facility]\par at  [facility]\par \par Radiation \par [facility]/\par [facility]\par [address]\par [place], [place] [address]\par \par Tel: [phone]\par Fax: [phone]\par

## 2022-03-04 ENCOUNTER — APPOINTMENT (OUTPATIENT)
Dept: PULMONOLOGY | Facility: CLINIC | Age: 62
End: 2022-03-04
Payer: MEDICAID

## 2022-03-04 VITALS
SYSTOLIC BLOOD PRESSURE: 124 MMHG | WEIGHT: 189 LBS | RESPIRATION RATE: 16 BRPM | HEART RATE: 81 BPM | DIASTOLIC BLOOD PRESSURE: 70 MMHG | OXYGEN SATURATION: 95 % | BODY MASS INDEX: 34.57 KG/M2

## 2022-03-04 DIAGNOSIS — R91.8 OTHER NONSPECIFIC ABNORMAL FINDING OF LUNG FIELD: ICD-10-CM

## 2022-03-04 DIAGNOSIS — Z87.891 PERSONAL HISTORY OF NICOTINE DEPENDENCE: ICD-10-CM

## 2022-03-04 PROCEDURE — 99214 OFFICE O/P EST MOD 30 MIN: CPT

## 2022-03-04 RX ORDER — IBUPROFEN 800 MG/1
800 TABLET, FILM COATED ORAL 3 TIMES DAILY
Qty: 90 | Refills: 2 | Status: ACTIVE | COMMUNITY
Start: 2017-10-18

## 2022-03-04 RX ORDER — ALPRAZOLAM 1 MG/1
1 TABLET ORAL
Qty: 45 | Refills: 0 | Status: ACTIVE | COMMUNITY
Start: 2017-01-26

## 2022-03-04 RX ORDER — EPINEPHRINE 0.3 MG/.3ML
0.3 INJECTION INTRAMUSCULAR
Qty: 1 | Refills: 0 | Status: ACTIVE | COMMUNITY
Start: 2017-06-08

## 2022-03-04 RX ORDER — METOPROLOL SUCCINATE 50 MG/1
50 TABLET, EXTENDED RELEASE ORAL
Qty: 30 | Refills: 0 | Status: ACTIVE | COMMUNITY
Start: 2017-07-07

## 2022-03-04 RX ORDER — METHYLPREDNISOLONE 4 MG/1
4 TABLET ORAL
Qty: 21 | Refills: 0 | Status: DISCONTINUED | COMMUNITY
Start: 2021-10-28 | End: 2022-03-04

## 2022-03-04 RX ORDER — UMECLIDINIUM BROMIDE AND VILANTEROL TRIFENATATE 62.5; 25 UG/1; UG/1
62.5-25 POWDER RESPIRATORY (INHALATION)
Qty: 1 | Refills: 5 | Status: ACTIVE | COMMUNITY
Start: 2021-03-05 | End: 1900-01-01

## 2022-03-04 RX ORDER — SILVER SULFADIAZINE 10 MG/G
1 CREAM TOPICAL
Qty: 400 | Refills: 0 | Status: ACTIVE | COMMUNITY
Start: 2021-12-28

## 2022-03-04 NOTE — CONSULT LETTER
[Dear  ___] : Dear  [unfilled], [Consult Letter:] : I had the pleasure of evaluating your patient, [unfilled]. [Please see my note below.] : Please see my note below. [Consult Closing:] : Thank you very much for allowing me to participate in the care of this patient.  If you have any questions, please do not hesitate to contact me. [Sincerely,] : Sincerely, [FreeTextEntry3] : Melvin Hopson DO [facility]\par Pulmonary Critical Care\par Director Pulmonary Division\par Medical Director Respiratory Therapy\par [facility]\par \par  [DrRoby  ___] : Dr. HOUGH [DrRoby ___] : Dr. HOUGH [Melvin Hopson DO, [facility]] : Melvin Hopson DO, [facility] [Director, Respiratory Care] : Director, Respiratory Care [[facility]] : [facility]

## 2022-03-04 NOTE — PHYSICAL EXAM
[General Appearance - Well Developed] : well developed [Normal Appearance] : normal appearance [General Appearance - Well Nourished] : well nourished [Normal Conjunctiva] : the conjunctiva exhibited no abnormalities [Normal Oropharynx] : normal oropharynx [II] : II [Neck Appearance] : the appearance of the neck was normal [Heart Rate And Rhythm] : heart rate and rhythm were normal [Heart Sounds] : normal S1 and S2 [Murmurs] : no murmurs present [Respiration, Rhythm And Depth] : normal respiratory rhythm and effort [Exaggerated Use Of Accessory Muscles For Inspiration] : no accessory muscle use [Auscultation Breath Sounds / Voice Sounds] : lungs were clear to auscultation bilaterally [FreeTextEntry1] : no chest wall abn [Abdomen Soft] : soft [Abnormal Walk] : normal gait [Nail Clubbing] : no clubbing of the fingernails [Cyanosis, Localized] : no localized cyanosis [] : no rash [No Focal Deficits] : no focal deficits [Oriented To Time, Place, And Person] : oriented to person, place, and time [Impaired Insight] : insight and judgment were intact [Affect] : the affect was normal [Memory Recent] : recent memory was not impaired

## 2022-03-04 NOTE — HISTORY OF PRESENT ILLNESS
[TextBox_4] : at baseline\par no fever, chill, chest pain\par using Anoro\par Cardiology Dr Hamlin\par post RT and lumpectomy, now LAVELL, on Tamoxifen\par smoke free\par no sig dyspnea\par  [FreeTextEntry1] : \par \par

## 2022-03-04 NOTE — DISCUSSION/SUMMARY
[FreeTextEntry1] : COPD Gold class III, asthma component, smoke free\par  no fever, chill, chest pain,. dyspnea at baseline\par exam without bronchospasm, normal sp02\par minute nodules on low dose rad CT scan stable 4/21, will repeat 4/22\par continued smoking cessation\par continue  Anoro daily ( LABA,LAMA), to use duo neb tid as substitute\par spirometry needs update at follow up\par sleep study negative for JAGDISH,\par 3 months or sooner if needed,

## 2022-03-04 NOTE — PROCEDURE
[FreeTextEntry1] : CT chest 4/21  stable minute nodules \par Sleep study 9/18: no sig JAGDISH, nocturnal hypoxemia\par spirometry moderate air flow obstruction,normal TLC, moderate decrease DLCO, no sig change

## 2022-03-29 ENCOUNTER — NON-APPOINTMENT (OUTPATIENT)
Age: 62
End: 2022-03-29

## 2022-04-11 PROBLEM — Z11.59 SCREENING FOR VIRAL DISEASE: Status: ACTIVE | Noted: 2020-07-14

## 2022-04-12 ENCOUNTER — RX RENEWAL (OUTPATIENT)
Age: 62
End: 2022-04-12

## 2022-04-25 ENCOUNTER — NON-APPOINTMENT (OUTPATIENT)
Age: 62
End: 2022-04-25

## 2022-04-25 VITALS — HEIGHT: 62 IN | WEIGHT: 182 LBS | BODY MASS INDEX: 33.49 KG/M2

## 2022-04-25 NOTE — HISTORY OF PRESENT ILLNESS
[TextBox_13] : Referred by Dr. Melvin Hopson.\par \par Ms. LOPEZ is a 61 year old female with a history of HTN, high cholesterol, COPD, emphysema and left breast cancer.\par \par She was called to review eligibility for Low-Dose CT lung cancer screening.  Reviewed and confirmed that the patient meets screening eligibility criteria:\par \par 61 years old \par \par Smoking Status: Former smoker \par \par Number of pack(s) per day: 1\par Number of years smoked: 31\par Number of pack years smokin\par \par Number of years since quitting smokin\par Quit year: \par \par Ms. LOPEZ denies any symptoms of lung cancer, including new cough, change in cough, hemoptysis, and unintentional weight loss.\par \par Ms. LOPEZ denies any personal history of lung cancer.  No lung cancer in a first degree relative.  Denies any history of occupational exposures.

## 2022-05-03 NOTE — PHYSICAL EXAM
[NI] : Normal [de-identified] : NAD, AxOx3 [de-identified] : nonlabored breathing  [de-identified] : Bilateral breasts with grade 3 ptosis. Right breast larger then left. Well healed lumpectomy scar left breast [de-identified] : Left lateral abdomen/left flank with soft, mobile mass measuring approximately 15 x15cm. Nontender, and no overlying skin changes or evidence of infection.  [de-identified] : no edema  [de-identified] : as above [de-identified] : normal affect

## 2022-05-03 NOTE — HISTORY OF PRESENT ILLNESS
[FreeTextEntry1] : Kemi Hill is a 60 y/o female with PSHx of left breast lumpectomy and radiation in 2018 with chronic left beast pain as well as back, neck and shoulder pain. She is scheduled for a bilateral breast reduction on 5/25/22.  \par Patient presents today with complaints of left flank mass for several years. She states this has grown in size and does cause a burning sensation at times \par Additionally, patient states she experienced a fall causing injury to her nose. Now she complaining of difficulty breathing, she believes she fractured her nose. \par Patient does admit that she stopped smoking.

## 2022-05-06 PROBLEM — Z87.891 FORMER SMOKER: Status: ACTIVE | Noted: 2022-03-04

## 2022-05-06 PROBLEM — Z01.818 PREOP EXAMINATION: Status: ACTIVE | Noted: 2021-05-03

## 2022-05-10 NOTE — ASSESSMENT
[FreeTextEntry1] : 60 yo woman , longterm smoker until four months ago, treated with Anoro and PRN albuterol\par She is preop for breast reduction surgery.\par Has hx of breast cancer x2 on left side with radiation\par Followed by Dr Hopson and Dr Corona in past\par recent lung cancer screening Lung Rads 2 in April\par \par Her respiratory status is at her baseline at this time\par \par PFTs Obstructive pulmonary disease with FEV1 1.02 Less than previously obtained in 2020\par FEV1% 49%\par Midexpir 16% pred\par Normal volumes\par \par Imp 60 yo woman with COPD , exsmoker is preop for breast reduction surgery\par She has not smoked for at least four months\par She says that she is at her baseline regarding symptoms and dyspnea\par Risk of surgery higher in view of COPD but not prohibitive\par Would optimize respiratory status with course of oral steroids\par Continue Anoro\par Utilize nebulized albuterol at least three times a day over next weeks prior to surgery\par Will see again in office one months post op

## 2022-05-10 NOTE — HISTORY OF PRESENT ILLNESS
[TextBox_4] : 60 yo woman , longterm smoker until four months ago, treated with Anoro and PRN albuterol\par She is preop for breast reduction surgery.\par Has hx of breast cancer x2 on left side with radiation\par Followed by Dr Hopson and Dr Corona in past\par recent lung cancer screening Lung Rads 2 in April\par \par Her respiratory status is at her baseline at this time\par but\par Here for PFTs preop

## 2022-05-10 NOTE — PROCEDURE
[FreeTextEntry1] : Obstructive pulmonary disease with FEV1 1.02 Less than previously obtained in 2020\par FEV1% 49%\par Midexpir 16% pred\par Normal volumes

## 2022-05-12 NOTE — H&P PST ADULT - HISTORY OF PRESENT ILLNESS
61 year old female with h/o bipolar, HTN, COPD and breast ca present to PST for breast reduction.  Reports years of neck and back pain as a results of hypertrophy of breasts.  Otherwise patient feels well today and denies any acute symptoms.

## 2022-05-12 NOTE — H&P PST ADULT - NSICDXPASTSURGICALHX_GEN_ALL_CORE_FT
PAST SURGICAL HISTORY:  H/O colonoscopy with polypectomy 2017    H/O lumpectomy left 2018 x 2    H/O:  2000    S/P cholecystectomy     S/P hernia repair

## 2022-05-12 NOTE — H&P PST ADULT - FALL HARM RISK - UNIVERSAL INTERVENTIONS
Bed in lowest position, wheels locked, appropriate side rails in place/Call bell, personal items and telephone in reach/Instruct patient to call for assistance before getting out of bed or chair/Non-slip footwear when patient is out of bed/[place] to call system/Physically safe environment - no spills, clutter or unnecessary equipment/Purposeful Proactive Rounding/Room/bathroom lighting operational, light cord in reach

## 2022-05-12 NOTE — H&P PST ADULT - BRAND OF FIRST COVID-19 BOOSTER
Detail Level: Zone
Initiate Treatment: chlorhexidine gluconate 2 % topical liquid \\nSig: Apply at night to affected area\\n\\nclindamycin phosphate 1 % topical solution \\nSig: Apply twice daily to affected area\\n\\nminocycline 100 mg capsule \\nSig: Take by mouth twice daily 30 minutes after a meal that contains  no dairy\\n\\nspironolactone 50 mg tablet \\nSig: Take by mouth once daily
Moderna

## 2022-05-12 NOTE — H&P PST ADULT - NSICDXPASTMEDICALHX_GEN_ALL_CORE_FT
PAST MEDICAL HISTORY:  Anxiety     Asthma     Bipolar 1 disorder no therapy    Breast cancer     Breast hypertrophy     COPD (chronic obstructive pulmonary disease) emphysema    Depressed     Diverticulosis     Gall bladder disease     Hypertension     Kidney stone     Lipoma left abdomen / hip    Lipoma of abdominal wall left lateral    Risk factors for obstructive sleep apnea

## 2022-05-18 PROBLEM — N62 HYPERTROPHY OF BREAST: Chronic | Status: ACTIVE | Noted: 2022-01-01

## 2022-05-25 NOTE — ASU DISCHARGE PLAN (ADULT/PEDIATRIC) - NS MD DC FALL RISK RISK
For information on Fall & Injury Prevention, visit: [email or web address] OR  [email or web address] OR  https://www.cdc.gov/steadi/patient.html

## 2022-05-25 NOTE — ASU DISCHARGE PLAN (ADULT/PEDIATRIC) - CONDITION AT DISCHARGE
.Caller would like to discuss an/a Return call for abdominal pain. Writer advised caller of callback within 24-72 hours.    Patient Name: Christiana Looney  Caller Name: self  Name of Facility: na  Callback Number: [phone]  Best Availability: Afternoons, per patient  Can A Detailed Message Be left? yes per patient, must be in [place]  Fax Number: na  Additional Info: Patient was seen in ER and scheduled next available, told to contact also for her abdominal pain and what can be taken for treatment, thank you  Did you confirm the message with the caller?: yes    Thank you,  Kelly Gandhi   Stable

## 2022-05-25 NOTE — ASU PATIENT PROFILE, ADULT - FALL HARM RISK - UNIVERSAL INTERVENTIONS
Bed in lowest position, wheels locked, appropriate side rails in place/Call bell, personal items and telephone in reach/Instruct patient to call for assistance before getting out of bed or chair/Non-slip footwear when patient is out of bed/[place] to call system/Physically safe environment - no spills, clutter or unnecessary equipment/Purposeful Proactive Rounding/Room/bathroom lighting operational, light cord in reach

## 2022-05-25 NOTE — ASU DISCHARGE PLAN (ADULT/PEDIATRIC) - BATHING
Keep dressings and incisions clean and dry for 48 hrs. May shower after 48 hrs./Do not submerge in water

## 2022-05-25 NOTE — BRIEF OPERATIVE NOTE - OPERATION/FINDINGS
Preoperative Dx: Bilateral Macromastia  Procedure: Bilateral breast reduction  Left breast tissue and skin- 517 gram  Right breast tissue and skin- 631 grams

## 2022-05-25 NOTE — ASU DISCHARGE PLAN (ADULT/PEDIATRIC) - PATIENT EDUCATION MATERIALS PROVIED
medication/Provider pre-printed instructions given/Pre-printed instructions given for drain care/Pre-printed instructions given for other (specify)

## 2022-05-25 NOTE — ASU DISCHARGE PLAN (ADULT/PEDIATRIC) - ASU DC SPECIAL INSTRUCTIONSFT
Keep incisions clean and dry for 48 hrs. May shower after 48 hrs. Keep back to shower water  Replace surgical bra after showering  Keep white steri strips in place.   No heavy lifting or straining.   Empty and record drain outputs twice a day  Ambulate often   Use your incentive spirometer 10x/hr for the next week  Take your pulmonary and blood pressure medication as directed  Followup with Dr. Shikowitz-Behr in one week at [address] [place] [address]  Call office at [phone] to make an appointment

## 2022-05-31 NOTE — ASU PATIENT PROFILE, ADULT - NS PRO MODE OF ARRIVAL
Physical Therapy Daily Progress Note    Patient: Lupe Bassett   : 1980  Diagnosis/ICD-10 Code:  Chronic pain of right ankle [M25.571, G89.29]  Referring practitioner: Martin Lala MD  Date of Initial Visit: Type: THERAPY  Noted: 2022  Today's Date: 2022  Patient seen for 3 sessions           Subjective: Janina reports that the needling done to her R foot has decreased pain moderately.     Objective     Observation: She showed excessive forefoot pronation with each foot during the gait cycle.   Forefoot flexibility: 40 degrees of passive supination, B    Treatment  1. Orthotic fit/train: impressions taken for custom made orthotics.  2. Seated towel scrunches, x 20, B  3. Seated arch raises x 20, B  4. Patient education:  I added seated arch raises and towel scrunches to her HEP.     Assessment & Plan     Assessment    Assessment details: Janina has a flexible forefoot that should adapt well to a custom made orthotic.  Her feet both overly pronate during weight bearing, especially during the gait cycle.     Plan  Plan details: Monitor the effect of today's treatment and continue as indicated.            Timed:    Manual Therapy:         mins  11879;  Therapeutic Exercise:    20     mins  73227;     Neuromuscular Benedicto:    5    mins  29432;    Therapeutic Activity:          mins  29508;     Gait Training:           mins  15450;     Ultrasound:          mins  39602;    Electrical Stimulation:         mins  44260 ( );  Iontophoresis         mins 62922;  Aquatic Therapy         mins 73350;  Dry Needling                   mins 70206/  (Self-pay)  Orthotic fit/train           15 mins 17430  Untimed:  Electrical Stimulation:         mins  93477 ( );  Traction:         mins  27068;     Timed Treatment:   40   mins   Total Treatment:     40   mins    Tye Degroot PT  Physical Therapist    [place] License:[id number]  
ambulatory

## 2022-05-31 NOTE — HISTORY OF PRESENT ILLNESS
[FreeTextEntry1] : DEX LOPEZ is a 61 year F who presents 1 wk s/p bilateral breast reduction. She is here with her sister. She state that the surgical bra is very itchy and the drains are very uncomfortable. She is not taking prescription pain medications. \par

## 2022-05-31 NOTE — PHYSICAL EXAM
[NI] : Normal [de-identified] : NAD [de-identified] : Right breast incisions are C/D/I, expected bruising and swelling. NAC viable. \par Left breast incisions are C/D/I, superficial blister/epidermolysis of the lateral skin flap. No open wounds, no bleeding or infection. NAC viable. \par \par Drains removed.

## 2022-06-07 PROBLEM — L50.9 URTICARIA: Status: ACTIVE | Noted: 2022-01-01

## 2022-06-09 NOTE — PHYSICAL EXAM
[NI] : Normal [de-identified] : NAD, AxOx3 [de-identified] : no rashes or skin lesions noted. There is excoriated areas from patient on her ears and right posterior neck  [de-identified] : audible wheezing (unchanged)  [de-identified] : Left breast- with superficial eschar to lateral skin flap. The area is dry without any evidence of infection. NAC viable and rest of left breast incisions are clean dry and intact.\par Right breast- incisions clean dry and intact. NAC viable. There is no evidence of infection, fluid collection or skin breakdown. [de-identified] : no edema [de-identified] : no appreciable rashes  [de-identified] : anxious

## 2022-06-09 NOTE — REVIEW OF SYSTEMS
[Fever] : no fever [Chills] : no chills [Shortness Of Breath] : shortness of breath [As Noted in HPI] : as noted in HPI [Negative] : Psychiatric [FreeTextEntry4] : itchy and hives

## 2022-06-09 NOTE — HISTORY OF PRESENT ILLNESS
[FreeTextEntry1] : Kemi Hill is a 62 y/o female s/p bilateral breast reduction 5/25/22. Patient is complaining of feeling itchy and having welts/hives all over head and neck.  Patient has no post surgical breast complaints. She denies consuming any new medications or foods, she denies using any new perfumes, soaps or laundry detergent. \par She denies any fever or chills

## 2022-06-17 NOTE — REVIEW OF SYSTEMS
[Fever] : no fever [Chills] : no chills [As Noted in HPI] : as noted in HPI [Negative] : Musculoskeletal [de-identified] : anxious

## 2022-06-17 NOTE — HISTORY OF PRESENT ILLNESS
[FreeTextEntry1] : Kemi Hill is a 62 y/o female s/p bilateral breast reduction on 5/25/22. Patient had called earlier in the week to say she had a bad infection to her left breast. Antibiotics submitted.  Patient did come to her appointment today. She denies any fever chills or any other constitutional complaints.

## 2022-06-17 NOTE — PHYSICAL EXAM
[NI] : Normal [de-identified] : NAD, Axox3 [de-identified] : nonlabored breathing  [de-identified] : Left breast- with superficial eschar to lateral skin flap.The area is dry without any evidence of infections. NAC is viable, sensation is intact,  and rest of left breast incision is clean dry and intact. \par Right breast with incision clean dry and intact. NAC viable and sensation is intact. There is no evidence of infection, fluid collection or skin breakdown,  [de-identified] : Left ankle edema [de-identified] : as above [de-identified] : anxious

## 2022-07-05 NOTE — REVIEW OF SYSTEMS
[As Noted in HPI] : as noted in HPI [Negative] : Psychiatric [Fever] : no fever [Chills] : no chills [FreeTextEntry7] : disention

## 2022-07-05 NOTE — PHYSICAL EXAM
[NI] : Normal [de-identified] : NAD, AxOx3  [de-identified] : nonlabored breathing  [de-identified] : Left breast- previous wound is smaller in size and has contracted. There is a small amount fibrinous exudate centrally but rest of the wound has granulating tissue with punctate bleeding. NAC is viable and sensation is intact. re is no evidence of infection or fluid collection \par Right breast with incisions healing well. NAC is viable and sensation is intact. There is no evidence of infection, fluid collection or skin breakdown [de-identified] : softly distended [de-identified] : mild bilateral LE swelling  [de-identified] : as above  [de-identified] : normal affect

## 2022-07-05 NOTE — HISTORY OF PRESENT ILLNESS
[FreeTextEntry1] : Kemi Hill is a 60y/o female s/p bilateral breast reduction on 5/25/22. Patient has been performing wet to dry dressing changes to her left breast Tpoint area.  She is complaining of feeling bloated and distended today but states she has not had a bowel movement in several days.\par She denies any other constitutional complaints.

## 2022-07-28 PROBLEM — F41.9 ANXIETY AND DEPRESSION: Status: ACTIVE | Noted: 2020-11-30

## 2022-07-28 PROBLEM — Z98.890 HISTORY OF LUMPECTOMY OF LEFT BREAST: Status: ACTIVE | Noted: 2019-09-30

## 2022-07-28 NOTE — ASSESSMENT
[No evidence of disease] : No evidence of disease [FreeTextEntry1] : No treatment related related sequelae per se. Recent B/L breast reduction surgery with 4x2 cm area of eschar( wound healing issues and persistent left breast edema likely related to prior  RT.

## 2022-07-28 NOTE — LETTER CLOSING
[Sincerely yours,] : Sincerely yours, [FreeTextEntry3] : Roland Fuentes MD\par Physician in Chief\par Department of Radiation Medicine\par [facility]\par [facility]\par \par  of Radiation Medicine\par [facility]\par at  [facility]\par \par Radiation \par [facility]/\par [facility] [place]\par [address]\par [place], [place] [address]\par \par Tel: [phone]\par Fax: [phone]\par

## 2022-07-28 NOTE — PHYSICAL EXAM
[Obese] : obese [Normal] : normal skin color and pigmentation and no rash [de-identified] : 4 x 2 cm area of eschar under left breast areola, otherwise surgical incisions well healed, surrounding skin without erythema; lleft breast with modest edema and hyperpigmentation. [de-identified] : moderately distended  [de-identified] : BLE with morbiliform-like redness, no warmth or edema noted [de-identified] : somewhat emotionally labile

## 2022-07-28 NOTE — REVIEW OF SYSTEMS
[Anxiety] : anxiety [Depression] : depression [Negative] : Psychiatric [FreeTextEntry6] : COPD [FreeTextEntry9] : Per patient, she is taking Cipro for BLE cellulitis, almost resolved [de-identified] : 4 x 2 cm area of eschar under left breast areola, otherwise surgical incisions well healed

## 2022-07-28 NOTE — HISTORY OF PRESENT ILLNESS
[FreeTextEntry1] : This 61 year-old woman presents for routine follow up.  Ms. Hill has a history of invasive lobular carcinoma, ER+, TN negative, HER2 negative, [id number] of the left breast.  She is s/p left breast lumpectomy in December, 2018 with Dr. Farias.  She received 4240 cGy radiation therapy to the left breast, completed on 4/18/2019.\par \par Follows with Dr. Easton, maintained on Tamoxifen. \par \par Ms. Hill is now s/p bilateral breast reduction on 5/25/22 with Dr. Shikowitz-Behr.  Patient has been performing wet to dry dressing changes to her left breast T-point area. However, this area is covered with eschar 2 x 4 cm.  She is complaining of feeling bloated and distended.  Ms. Hill also reports b/l lower extremity cellulitis for which she is taking Cipro.  She denies any other constitutional complaints. \par \par

## 2022-07-28 NOTE — DISEASE MANAGEMENT
[Pathological] : TNM Stage: p [IA] : IA [FreeTextEntry4] : invasive lobular carcinoma [TTNM] : 1c [NTNM] : 0 [MTNM] : 0 [de-identified] : 9328 cGy [de-identified] : left breast

## 2022-07-28 NOTE — VITALS
[Least Pain Intensity: 0/10] : 0/10 [Pain Description/Quality: ___] : Pain description/quality: [unfilled] [Pain Duration: ___] : Pain duration: [unfilled] [Pain Location: ___] : Pain Location: [unfilled] [OTC] : OTC [Adjuvant (neuroleptics)] : adjuvant (neuroleptics) [80: Normal activity with effort; some signs or symptoms of disease.] : 80: Normal activity with effort; some signs or symptoms of disease.  [Maximal Pain Intensity: 3/10] : 3/10 [Pain Interferes with ADLs] : Pain does not interfere with activities of daily living [ECOG Performance Status: 1 - Restricted in physically strenuous activity but ambulatory and able to carry out work of a light or sedentary nature] : Performance Status: 1 - Restricted in physically strenuous activity but ambulatory and able to carry out work of a light or sedentary nature, e.g., light house work, office work

## 2022-07-29 PROBLEM — L03.119 CELLULITIS OF EXTREMITY: Status: ACTIVE | Noted: 2022-01-01

## 2022-08-04 NOTE — PHYSICAL EXAM
ASSESSMENT: Teodoro Gregorio is a 81 y.o. male who presents for consultation at the request of HE PCP Juan Sethi MD, with a past medical history significant for insomnia, memory difficulty, hearing loss, major depressive disorder, latent TB, incontinence who presents today for new patient evaluation of:    -Chronic bilateral low back pain lumbosacral junction with perceived left leg weakness and paresthesias L5 dermatomal pattern.  Shopping carts syndrome noted with walking, question lumbar stenosis.    -Chronic left knee pain.    Patient is neurologically intact on exam. No myelopathic or red flag symptoms.     DOMINIC Score: 80    WHO 5: 5     Diagnoses and all orders for this visit:    Chronic bilateral low back pain without sciatica  -     MR Lumbar Spine Without Contrast; Future; Expected date: 08/05/2019  -     Ambulatory referral to PT/OT    Lumbar facet arthropathy  -     MR Lumbar Spine Without Contrast; Future; Expected date: 08/05/2019  -     Ambulatory referral to PT/OT    DDD (degenerative disc disease), lumbar  -     MR Lumbar Spine Without Contrast; Future; Expected date: 08/05/2019  -     Ambulatory referral to PT/OT    Chronic pain of left knee  -     Ambulatory referral to PT/OT    Numbness and tingling of left lower extremity  -     MR Lumbar Spine Without Contrast; Future; Expected date: 08/05/2019  -     Ambulatory referral to PT/OT      PLAN:  Reviewed spine anatomy and disease process. Discussed diagnosis and treatment options with the patient today. A shared decision making model was used.  The patient's values and choices were respected. The following represents what was discussed and decided upon by the provider and the patient.      -DIAGNOSTIC TESTS:  Images were personally reviewed and interpreted and explained to patient today using spine model.   --Ordered lumbar spine MRI to further evaluate chronic low back pain and left paresthesias L5 dermatomal pattern.  --Lumbar spine x-ray 7/17/2019 shows  [NI] : Normal dextroscoliosis 20 degrees L1-2.  Advanced L4-5 and L5-S1 degenerative changes with facet arthropathy.    -PHYSICAL THERAPY: Referral to physical therapy [facility] to establish core strengthening and nerve glide exercises sent today.  Discussed the importance of core strengthening, ROM, stretching exercises with the patient and how each of these entities is important in decreasing pain.  Explained to the patient that the purpose of physical therapy is to teach the patient a home exercise program.  These exercises need to be performed every day in order to decrease pain and prevent future occurrences of pain.        -MEDICATIONS: Advised patient continue gabapentin at this time.  Discussed multiple medication options today with patient. Discussed risks, side effects, and proper use of medications. Patient verbalized understanding.    -INTERVENTIONS: Consider injections down the road pending MRI imaging and response with physical therapy.  Discussed risks and benefits of injections with patient today.    -PATIENT EDUCATION:  45 minutes of total visit time was spent face to face with the patient today, greater than 50% of total time spent with patient was spent on counseling, education, and coordinating care.   -10 minutes spent outside of visit time, non-face-to-face time, reviewing chart.    -FOLLOW-UP:   Follow-up to review MRI and discuss ongoing plan.    Advised patient to call the [facility] if symptoms worsen or you have problems controlling bladder and bowel function.   ______________________________________________________________________    SUBJECTIVE:  HPI:  Teodoro Gregorio  Is a 81 y.o. male who presents today for new patient evaluation of low back pain that is chronic across the lumbosacral junction ongoing for many decades however progressively worsening per patient.  His pain is constant at the low back at a 5/10 does get up to an 8/10 at its worst most bothersome with walking where he feels  [de-identified] : NAD, AxOx3  bending slightly forward feels better.  He does feel weaker in his lower extremity left greater than right however denies any lower extremity pain.  Patient does endorse numbness and tingling sensations however into the left lateral thigh and lateral shin also ongoing.  He does have ongoing left knee pain localized but feels that that is unrelated to his spine as it does not correlate to when his back pain is bothersome.  Patient denies any recent trips or falls but he does have a history of falling out of a russ tree when he was younger.  Patient denies bowel or bladder loss control.     was present during entire visit today.   Patient's daughter was present today during entire visit and added to past medical/surgical/family/social history and history of presenting illness.     -Treatment to Date: No prior spinal surgery or spinal injection.    -Medications:  Gabapentin 300 mg with some benefit  Tylenol 325 mg  Tylenol-codeine #3 prescribed by PCP 5/3/2019, 90 tablets    Current Outpatient Medications on File Prior to Encounter   Medication Sig Dispense Refill     acetaminophen-codeine (TYLENOL-CODEINE #3) 300-30 mg per tablet Take 1 tablet by mouth every 4 (four) hours as needed for pain. 22 tablet 0     FLUoxetine (PROZAC) 20 MG capsule Take 1 capsule (20 mg total) by mouth daily. 30 capsule 5     gabapentin (NEURONTIN) 300 MG capsule Take 2 capsules (600 mg total) by mouth 2 (two) times a day. 120 capsule 2     multivitamin (ONE A DAY) per tablet Take 1 tablet by mouth daily. 120 tablet 2     traZODone (DESYREL) 50 MG tablet Take 1 tablet (50 mg total) by mouth at bedtime. 90 tablet 1     acetaminophen (TYLENOL) 325 MG tablet Take 1 tablet (325 mg total) by mouth every 4 (four) hours as needed for pain. 90 tablet 5     azelastine (OPTIVAR) 0.05 % ophthalmic solution Use one drop in eye daily 6 mL 12     meclizine (ANTIVERT) 25 mg tablet Take 1 tablet (25 mg total) by mouth 3 (three) times a day as  "needed for dizziness or nausea. 30 tablet 1     olopatadine 0.2 % Drop Apply 1 drop in eyes once a day 1 Bottle 3     polyethylene glycol (GLYCOLAX) 17 gram/dose powder   0     triamcinolone (KENALOG) 0.025 % cream Apply thin layer to affected area twice a day 30 g 1     No current facility-administered medications on file prior to encounter.        No Known Allergies    Past Medical History:   Diagnosis Date     Constipation      Hearing loss      Insomnia      Multiple joint pain      TB lung, latent         Patient Active Problem List   Diagnosis     Insomnia     Multiple joint pain     Hearing loss     Soft tissue mass     SNHL (sensorineural hearing loss)     Incontinence     Back pain     Memory difficulty     Mild single current episode of major depressive disorder (H)     Loose, teeth     Loss of appetite     TB lung, latent       Past Surgical History:   Procedure Laterality Date     FOOT SURGERY Left      No pertinent past family medical history.    Reviewed past medical, surgical, and family history with patient found on new patient intake packet located in EMR [employer] tab.     SOCIAL HX: Patient not currently working.  Patient denies smoking/tobacco use, denies alcohol use, denies history being heavy drinker, denies recreational drug use.    ROS: Positive for joint pain, muscle pain, muscle fatigue, dizziness, shortness of breath, wheezing.  Specifically negative for bowel/bladder dysfunction, balance changes, headache, dizziness, foot drop, fevers, chills, appetite changes, nausea/vomiting, unexplained weight loss. Otherwise 13 systems reviewed are negative. Please see the patient's intake questionnaire from today for details.    OBJECTIVE:  /73 (Patient Site: Left Arm, Patient Position: Sitting, Cuff Size: Adult Regular)   Pulse 71   Ht 5' 1\" (1.549 m)   Wt 118 lb (53.5 kg)   BMI 22.30 kg/m      PHYSICAL EXAMINATION:    --CONSTITUTIONAL:  Vital signs as above.  No acute distress.  The patient " [de-identified] : nonlabored breathing  [de-identified] : Left breast- left vertical incision Tpoint measures 3.8cm x 3.4cm x3mm. There continues to fibrinous exudate present. Minimal serous drainage. NO purulence. There is no evidence of infection. NAC is viable and sensation is intact\par Right breast well healed. NAC is viable and sensation is intact. There is no evidence of infection, fluid collection or skin breakdown.  is well nourished and well groomed.  --PSYCHIATRIC:  Appropriate mood and affect. The patient is awake, alert, oriented to person, place, time and answering questions appropriately with clear speech.    --SKIN:  Skin over the face, bilateral lower extremities, and posterior torso is clean, dry, intact without rashes.    --RESPIRATORY: Normal rhythm and effort. No abnormal accessory muscle breathing patterns noted.   --STANDING EXAMINATION:  Normal lumbar lordosis noted, no lateral shift.  --MUSCULOSKELETAL: Lumbar spine inspection reveals no evidence of deformity. Range of motion is limited in all movements: Lumbar flexion, extension, lateral rotation.  No tenderness to palpation lumbar spine. Straight leg raising in the seated position is negative to radicular pain. Sciatic notch non-tender.  --SACROILIAC JOINT: One Finger point test negative.  --GROSS MOTOR: Gait is forward bent.  Rises from seated position with difficulty due to pain.  --LOWER EXTREMITY MOTOR TESTING:  Plantar flexion left 5/5, right 5/5   Dorsiflexion left 5/5, right 5/5   Great toe MTP extension left 5/5, right 5/5  Knee flexion left 5/5, right 5/5  Knee extension left 5/5, right 5/5   Hip flexion left 5/5, right 5/5  Hip abduction left 5/5, right 5/5  Hip adduction left 5/5, right 5/5   --HIPS: Full range of motion bilaterally.   --NEUROLOGICAL:  1/4 patellar, medial hamstring, and achilles reflexes bilaterally.  Sensation to light touch is intact in the bilateral L4, L5, and S1 dermatomes. Babinski is negative. No clonus.  Negative Jammie reflex bilaterally.  --VASCULAR:  2/4 dorsalis pedis and posterior tibialsi pulses bilaterally.  Bilateral lower extremities are warm.  There is no pitting edema of the bilateral lower extremities.    RESULTS: Prior medical records from [facility] to current were reviewed today.    Imaging: Lumbar spine Imaging was personally reviewed and interpreted today. The images were shown to the patient and  [de-identified] : no UE/LE edema  the findings were explained using a spine model.      Xr Lumbar Spine 2 Or 3 Vws  Result Date: 7/18/2019  INDICATION: Low back pain COMPARISON: None. FINDINGS: 5 lumbar vertebral bodies. Mild presumably chronic L1-L3 compressions. Mild thoracolumbar kyphosis. 20 degree dextroscoliosis apex L1-L2. Advanced L4-L5 and L5-S1 interbody degenerative change with moderate L5-S1 facet arthropathy.      Xr Knee Bilateral Plus Sunrise Vw  Result Date: 7/17/2019  INDICATION: Pain in left knee COMPARISON: None. FINDINGS: Normal alignment without a fracture or dislocation. Paucity of degenerative changes given age.            [de-identified] : as above  [de-identified] : normal affect

## 2022-08-04 NOTE — HISTORY OF PRESENT ILLNESS
[FreeTextEntry1] : Kemi Hill is a 62 y/o female s/p bilateral breast reduction on 5/25/22. Patient continues to perform wet to dry dressing changes to her left breast Tpoint area. She states the dressing changes are slightly painful\par She continues to complain about feelings of abdominal distention\par She has no other complaints today

## 2022-08-08 PROBLEM — M79.89 LEFT LEG SWELLING: Status: ACTIVE | Noted: 2022-01-01

## 2022-08-11 NOTE — REVIEW OF SYSTEMS
[As Noted in HPI] : as noted in HPI [Negative] : Psychiatric [Fever] : no fever [Chills] : no chills [FreeTextEntry9] : left leg swelling

## 2022-08-11 NOTE — PHYSICAL EXAM
[de-identified] : NAD, AxOx3  [de-identified] : nonlabored breathing  [de-identified] : Right breast is well healed. NAC is viable and sensation is intact.  There is no evidence of infection, fluid collection or skin breakdown.\par Left breast - Tpoint wound is 3cm x3.2cm x 2mm in size. There is some fibrinous exudate at the base. There is no evidence of infection.NAC is viable with sensation intact. Minimal serous drainage.  [de-identified] : + [de-identified] : +LLE edema [de-identified] : as above [de-identified] : normal affect

## 2022-08-11 NOTE — HISTORY OF PRESENT ILLNESS
[FreeTextEntry1] : Kemi Hill is a 62 y/o female s/p bilateral breast reduction on 5/25/22. Patient presenting today for wound vac placement for persistent left breast Tpoint wound.\par Patient also c/o pain and swelling to left lower leg.

## 2022-08-25 NOTE — PHYSICAL EXAM
[de-identified] : NAD, AxOx3  [de-identified] : nonlabored breathing  [de-identified] : Left breast- Tpoint wound improved in appearance. There is still some fibrinous exudate noted but improved since last visit. Wound is granulating. There is no drainage or evidence of infection. There is the same post radiation changes to the breast. wound size today is 3cm x3.4cm x 0.5cm \par Right breast is well healed.  [de-identified] : minimal erythema and swelling to left foot/ankle [de-identified] : as above  [de-identified] : normal affect

## 2022-08-25 NOTE — HISTORY OF PRESENT ILLNESS
[FreeTextEntry1] : Kemi Hill is a 60 y/o female s/p bilateral breast reduction on 5/25/22. Patient continues with wound vac treatment for left breast wound. Patient c/o burning  pain to left breast and left lower leg. She has been tolerating wound vac changes.\par Patient inquiring about scheduling date for removal of her left flank lipoma

## 2022-09-01 PROBLEM — R14.0 ABDOMINAL BLOATING: Status: ACTIVE | Noted: 2017-11-03

## 2022-09-07 PROBLEM — N62 MACROMASTIA: Status: ACTIVE | Noted: 2021-09-13

## 2022-09-07 NOTE — PHYSICAL EXAM
[de-identified] : NAD, AxOx3  [de-identified] : nonlabored breathing  [de-identified] : Left breast- Tpoint wound improving in appearance. There is a layer of nonviable tissue.  wound is concepcion and there is some noted granulation tissue as well. NAC is viable. There is the same post radiation changes to the left breast. Breast softening \par Right breast well healed. [de-identified] : +LE edema  [de-identified] : as above  [de-identified] : normal affect

## 2022-09-07 NOTE — PHYSICAL EXAM
[de-identified] : NAD, AxOx3  [de-identified] : nonlabored breathing  [de-identified] : Left breast-Tpoint wound continues to improve in appearance. The wound is clean and is granulating and concepcion. NAC is viable. There are post radiation changes to the left breast. Wound size today: 3cm x 3.5cm x 0.25cm \par Right breast well healed [de-identified] : +LE edema  [de-identified] : normal affect

## 2022-09-07 NOTE — HISTORY OF PRESENT ILLNESS
[FreeTextEntry1] : Kemi Hill is a 60 y/o female s/p bilateral breast reduction on 5/25/22. Patient had wound vac removed due to blistering of the inferior skin. Patient still c/o pain to the breast, which has been present since radiation therapy, and has continued throughout her entire post-operative course.  She understands that the non-healing wound is due to the radiated status of the breast, and that this was discussed thoroughly prior to undergoing surgery. Although there is skin loss, the underlying tissue remains healthy and will be amenable to a skin graft for closure. She does admit that over the last several months the pain to her neck and back which prompted the breast reduction operation has been alleviated. \par \par She continues to report terrible pain in her legs, associated with swelling. She is taking lasix and has seen her primary care doctor. She will follow up with a cardiologist.

## 2022-09-12 PROBLEM — S21.002A OPEN WOUND OF LEFT BREAST, INITIAL ENCOUNTER: Status: ACTIVE | Noted: 2022-01-01

## 2022-09-12 NOTE — PHYSICAL EXAM
[NI] : Normal [de-identified] : NAD, AxOx3 [de-identified] : productive cough at time of examination [de-identified] : Left breast less firm than on prior examination. The wound is stable, decreased  tunneling along the lateral border. Healthy, red, granulation tissue is present throughout. Punstate bleeding throughout. No signs of infection. VAC replaced.

## 2022-09-12 NOTE — REVIEW OF SYSTEMS
[Cough] : cough [As Noted in HPI] : as noted in HPI [Limb Pain] : limb pain [Limb Swelling] : limb swelling [Skin Wound] : skin wound [Breast Pain] : breast pain [Negative] : Constitutional [Fever] : no fever [Chills] : no chills [FreeTextEntry6] : productive cough [de-identified] : l

## 2022-09-12 NOTE — HISTORY OF PRESENT ILLNESS
[FreeTextEntry1] : Kemi is in better spirits today. She has been speaking frequently with her daughter and she is planning to come and visit in the near future. She also speaks very highly of her visiting nurse, Freida. She reports that the pain and swelling in her legs are slightly improved. \par however, she states that her blood oxygen is very low. She is schedule for another visit with the cardiologist this week.\par \par She has been tolerating the wound VAC and says the breast feels softer. She expressed understanding in the surgical plan for skin graft in the OR vs office setting depending on her overall health optimization. Insurance authorization has been submitted.

## 2022-09-15 PROBLEM — I10 BENIGN ESSENTIAL HYPERTENSION: Status: ACTIVE | Noted: 2017-07-07

## 2022-09-15 PROBLEM — R06.02 SHORTNESS OF BREATH: Status: ACTIVE | Noted: 2017-11-03

## 2022-09-15 PROBLEM — R60.0 BILATERAL LEG EDEMA: Status: ACTIVE | Noted: 2018-12-04

## 2022-09-26 NOTE — PHYSICAL EXAM
[de-identified] : NAD, AxOx3 [de-identified] : improved SOB [de-identified] : Left breast less firm then prior examination. The wound is improving and lateral tunneling that was previously present has now granulated in. There is healthy granulating tissue present. Bleeding edges of the wound. There are no signs of infection or concern able drainage.  [de-identified] : improved edema  [de-identified] : as above [de-identified] : normal affect

## 2022-09-26 NOTE — HISTORY OF PRESENT ILLNESS
[FreeTextEntry1] : Kemi Hill is a 62 y/o female s/p bilateral breast reduction with nonhealing left breast wound. She continues with left breast wet to dry dressing changes with visiting nurse. Patient states her leg swelling has much improved since following up with cardiology. Cardiology clearance appreciated.  Patient has no complaints today \par Patient is pending plan for skin graft in OR vs office setting and excision of enlarging left flank lipoma.Pulmonary clearance is pending

## 2022-10-06 NOTE — HISTORY OF PRESENT ILLNESS
[Former] : former [TextBox_4] : 61F PMH severe COPD, former smoker, L breast CA x2 s/p RT, HTN, bipolar who presents for f/u visit, pulmonary optimization prior to surgery. In 05/2022 PFT showed FEV1 1.02L (47.4%). PFT today showed FEV1 of 0.99L (46.7%). Pt is on Anoro and PRN albuterol. She is here for a pre-op visit prior to revision/closure of left breast wound with skin graft, and lipoma excision with Dr. Shikowitz-Behr. [TextBox_11] : 1 [TextBox_13] : 30 [YearQuit] : 2022

## 2022-10-07 NOTE — HISTORY OF PRESENT ILLNESS
[FreeTextEntry1] : Kemi Hill is a 61 /o female s/p bilateral breast reduction with nonhealing left breast wound. She continues with left breast wet to dry dressing changes with visiting nurse.  Patient has no other complaints today.She continues to deny fever or chilss.  Cardiology clearance in chart, pulmonary clearance is pending. \par Patient is to be scheduled for skin graft of left breast wound and excision of lipoma flank

## 2022-10-07 NOTE — PHYSICAL EXAM
[de-identified] : nonlabored breathing  [de-identified] : NAD, AxOx3  [de-identified] : Left breast less firm then prior examination.  The wound is improving with granulation tissue present. Wound has become more shallow and continues to contract.  Bleeding edges. There are no signs of infection or concern about drainage. \par Wound measures 2.7cm x2.1 cm  [de-identified] : improved edema  [de-identified] : as above  [de-identified] : normal affect

## 2022-10-10 NOTE — H&P PST ADULT - FALL HARM RISK - UNIVERSAL INTERVENTIONS
Bed in lowest position, wheels locked, appropriate side rails in place/Call bell, personal items and telephone in reach/Instruct patient to call for assistance before getting out of bed or chair/Non-slip footwear when patient is out of bed/[place] to call system/Physically safe environment - no spills, clutter or unnecessary equipment/Purposeful Proactive Rounding/Room/bathroom lighting operational, light cord in reach

## 2022-10-10 NOTE — H&P PST ADULT - ATTENDING COMMENTS
Patient's left breast wound has healed. No skin graft needed today.   Will proceed with excision of left flank lipoma.

## 2022-10-10 NOTE — H&P PST ADULT - PROBLEM SELECTOR PLAN 1
PST: CBC, CMP, EKG   Medical evaluation with Dr. Moore  Cardiology clearance with Dr Burgos  Pulmonary clearance with Dr. Burrows   Preoperative instructions reviewed including Dial antibacterial wash which will replace CHG due to pts skin status.   Fully vaccinated; Denies recent travel, recent illness and sick contact with COVID in the last 3 weeks   Covid swab at [facility]. Prescription given to pt.  Pt verbalized understanding.

## 2022-10-10 NOTE — H&P PST ADULT - ASSESSMENT
This 60 yo f with a significant PMHX HTN, COPD, Breast Ca, Diverticulitis  presents to PST for a scheduled excision of soft tissue mass left flank full thickness graft to left breast  with Dr Shikowitz Behr on 10/20/22 .

## 2022-10-10 NOTE — H&P PST ADULT - HISTORY OF PRESENT ILLNESS
This 60 yo f with a significant PMHX HTN, COPD, Breast Ca, Diverticulitis  presents to PST for a scheduled excision of soft tissue mass left flank full thickness graft to left breast  with Dr Shikowitz Behr on 10/20/22 . Pt is electing to have this procedure.

## 2022-10-13 NOTE — HISTORY OF PRESENT ILLNESS
[FreeTextEntry1] : Kemi Hill is a 60 y/o female s/p bilateral breast reduction with nonhealing left breast wound. She continues with left breast wet to dry dressing changes. Patient has no other complaints today. She is scheduled for skin graft to left breast wound and excision of lipoma flank.\par Cardiology and pulmonary clearances appreciated.

## 2022-10-13 NOTE — PHYSICAL EXAM
[de-identified] : NAD, AxOx3  [de-identified] : nonlabored breathing  [de-identified] : Left breast softening. Left breast wound continues to contract. There is granulation tissue noted. Wound bed is shallowand healthy in appearance. Right breast well healed. There is no evidence of infection [de-identified] : no edema [de-identified] : as above  [de-identified] : normal affect

## 2022-10-19 NOTE — ASU PATIENT PROFILE, ADULT - PATIENT'S PREFERRED PRONOUN
Addendum Note by Leeanne Moss MD at 1/27/2020 11:30 AM     Author: Leeanne Moss MD Service: -- Author Type: Physician    Filed: 1/27/2020 11:30 AM Encounter Date: 1/24/2020 Status: Signed    : Leeanne Moss MD (Physician)    Addended by: LEEANNE MOSS on: 1/27/2020 11:30 AM        Modules accepted: Orders        
Her/She

## 2022-10-19 NOTE — ASU PATIENT PROFILE, ADULT - DOES PATIENT HAVE ADVANCE DIRECTIVE
Efrain [phone] sig other /Jenny [phone] daughter/Yes Efrain Singh -[phone] sig other /Jenny [phone] daughter/Yes

## 2022-10-20 NOTE — BRIEF OPERATIVE NOTE - NSICDXBRIEFPROCEDURE_GEN_ALL_CORE_FT
PROCEDURES:  Excision of subcutaneous lipoma 3 cm or more in diameter from abdominal wall 20-Oct-2022 14:46:16  Alida Guerrero

## 2022-10-20 NOTE — ASU DISCHARGE PLAN (ADULT/PEDIATRIC) - CARE PROVIDER_API CALL
ShikowitzBehr, Lauren B (MD)  Plastic Surgery  [address] [place] [address]  Phone: [phone]  Fax: [phone]  Follow Up Time:

## 2022-10-20 NOTE — ASU DISCHARGE PLAN (ADULT/PEDIATRIC) - NS MD DC FALL RISK RISK
For information on Fall & Injury Prevention, visit: [email or web address] OR  [email or web address] OR  https://www.cdc.gov/steadi/patient.html

## 2022-10-20 NOTE — ASU DISCHARGE PLAN (ADULT/PEDIATRIC) - CALL YOUR DOCTOR IF YOU HAVE ANY OF THE FOLLOWING:
Bleeding that does not stop/Swelling that gets worse/Pain not relieved by Medications/Fever greater than (need to indicate Fahrenheit or Celsius)/Wound/Surgical Site with redness, or foul smelling discharge or pus/Excessive diarrhea

## 2022-10-20 NOTE — ASU DISCHARGE PLAN (ADULT/PEDIATRIC) - ASU DC SPECIAL INSTRUCTIONSFT
Keep incision clean and dry for 48 hrs. May shower after 48 hrs  Keep back to shower water, pat incision dry  Keep white steri strips in place  Empty and record caryl drain output twice a day  No heavy lifting or straining  Ambulate often  Followup with Dr. Shikowitz-Behr in one week

## 2022-11-06 PROBLEM — D17.1 LIPOMA OF ABDOMINAL WALL: Status: ACTIVE | Noted: 2022-01-01

## 2022-11-06 PROBLEM — S21.002S BREAST WOUND, LEFT, SEQUELA: Status: ACTIVE | Noted: 2022-01-01

## 2022-11-06 NOTE — PHYSICAL EXAM
[de-identified] : NAD, AxOx3  [de-identified] : nonlabored breathing  [de-identified] : Left breast with closed wound and scabbing noted.  There is no evidence of infection.  [de-identified] : no edema [de-identified] : as above  [de-identified] : normal affect  [de-identified] : Left flank- incision is clean, dry and intact. There is no evidence of bleeding, infection or fluid collection.  Drain is light serosanguinous in output

## 2022-11-06 NOTE — HISTORY OF PRESENT ILLNESS
[FreeTextEntry1] : Kemi Hill is a 62 y/o female s/p excision of left flank mass. Patient c/o some "soreness"but denies any significant pain.  She states left breast wound continues to improve\par Drain meets criteria for removal

## 2022-11-20 NOTE — BRIEF OPERATIVE NOTE - ELECTIVE PROCEDURE
Yes
[FreeTextEntry3] : Medical record entries made by the scribe today today, were at my direction and personally dictated to them by me, Dr. Mica Young on November 18, 2022. I have reviewed the chart and agree that the record accurately reflects my personal performance of the history, physical exam, assessment, and plan.\par 
[FreeTextEntry3] : Medical record entries made by the scribe today today, were at my direction and personally dictated to them by me, Dr. Mica Young on November 18, 2022. I have reviewed the chart and agree that the record accurately reflects my personal performance of the history, physical exam, assessment, and plan.\par

## 2022-11-28 NOTE — ASU PATIENT PROFILE, ADULT - TEACHING/LEARNING DEVELOPMENTAL CONSIDERATIONS
[facility] - Periop Services  Discharge Note  Short Stay    Procedure(s) (LRB):  INCISION AND DRAINAGE, BREAST (Right)      OUTCOME: Patient tolerated treatment/procedure well without complication and is now ready for discharge.    DISPOSITION: Home or Self Care    FINAL DIAGNOSIS:  Breast abscess    FOLLOWUP: In clinic    DISCHARGE INSTRUCTIONS:    Discharge Procedure Orders   Diet general     Remove dressing in 48 hours     Call MD for:  temperature >100.4     Call MD for:  persistent nausea and vomiting     Call MD for:  severe uncontrolled pain     Call MD for:  difficulty breathing, headache or visual disturbances         Clinical Reference Documents Added to Patient Instructions         Document    GEN RETURN TO WORK/SCHOOL            TIME SPENT ON DISCHARGE: 10 minutes   none

## 2022-12-20 PROBLEM — Z74.09 IMPAIRED FUNCTIONAL MOBILITY, BALANCE, GAIT, AND ENDURANCE: Status: ACTIVE | Noted: 2022-01-01

## 2022-12-20 PROBLEM — G89.28 POST-MASTECTOMY PAIN SYNDROME: Status: ACTIVE | Noted: 2021-07-16

## 2022-12-20 NOTE — PHYSICAL EXAM
[FreeTextEntry1] : Gen: Patient is A&O x 3, NAD\par HEENT: EOMI, hearing grossly normal\par Resp: regular, non - labored\par CV: pulses regular\par Skin: no rashes, erythema\par Lymph: no clubbing, cyanosis, edema, or palpable lymphadenopathy\par Inspection: no instability or misalignment\par ROM: FROM\par Palpation:  TTP bilateral pectoral muscle and along previously healed surgical incisions\par Sensation: intact to light touch\par Reflexes: 1+ and symmetric throughout\par Strength: 5/5 throughout\par Special tests: -Mendoza's sign, -Straight leg raise  \par Gait: normal, non-antalgic\par \par Entire encounter performed with Marlen Oneill present.\par

## 2022-12-20 NOTE — HISTORY OF PRESENT ILLNESS
[FreeTextEntry1] : Ms. Hill is a 62 year-old female with a history of invasive lobular carcinoma, ER+, VT negative, HER2 negative, mH6eH3M2 of the left breast. She is s/p left breast lumpectomy in December, 2018 with Dr. Farias. She received 4240 cGy radiation therapy to the left breast completed on 4/18/2019.   S/p bilateral breast reduction in May 2022.  \par \par She reports since her breast reduction she has had significant tightness and pain symptoms in her bilateral chest wall region.  She feels tightness with overhead activities.  She reports generalized weakness overall since her previous cancer treatment.  Denies any focal weakness or new numbness or tingling or bowel bladder dysfunction.  She did not obtain previously ordered imaging.  \par

## 2022-12-20 NOTE — ASSESSMENT
[FreeTextEntry1] : 62-year-old female with history of breast cancer presenting for evaluation.\par \par #Postmastectomy pain syndrome\par -Pain location along previous surgical site with a component of fibrosis \par -Plastic surgery notes reviewed, status post wound VAC\par -Radiation oncology notes reviewed, no evidence of disease\par -Hematology oncology notes reviewed, previous treatment tamoxifen\par -Start breast rehabilitation program for myofascial release manual therapy with OT\par \par #Generalized weakness\par -No focal neurologic deficits on exam\par -Start physical therapy for low resistance strength training and aerobic endurance training.\par \par Follow-up in 1 month.

## 2022-12-30 PROBLEM — Z85.3 PERSONAL HISTORY OF BREAST CANCER: Status: ACTIVE | Noted: 2020-03-28

## 2022-12-30 NOTE — HISTORY OF PRESENT ILLNESS
[FreeTextEntry1] : Kemi Hill is a 60 y/o female s/p excision of left flank mass. Patient continues to complain of left breast pain. She is scheduled to start PT on January 5th 2023\par Patient admits to no further ETOH use\par Patient is also requesting a breast surgeon to follow due to her history of left breast cancer

## 2022-12-30 NOTE — PHYSICAL EXAM
[de-identified] : NAD, AxOx3  [de-identified] : nonlabored breathing  [de-identified] : Left breast well healed and slight radiation changes present.  [de-identified] : no edema [de-identified] : as above  [de-identified] : normal affect  [de-identified] : Left flank- incision is well healed. There is no evidence of infection

## 2023-01-01 ENCOUNTER — APPOINTMENT (OUTPATIENT)
Dept: HEMATOLOGY ONCOLOGY | Facility: CLINIC | Age: 63
End: 2023-01-01
Payer: MEDICAID

## 2023-01-01 ENCOUNTER — APPOINTMENT (OUTPATIENT)
Dept: PULMONOLOGY | Facility: CLINIC | Age: 63
End: 2023-01-01
Payer: MEDICAID

## 2023-01-01 ENCOUNTER — APPOINTMENT (OUTPATIENT)
Dept: ULTRASOUND IMAGING | Facility: CLINIC | Age: 63
End: 2023-01-01

## 2023-01-01 ENCOUNTER — NON-APPOINTMENT (OUTPATIENT)
Age: 63
End: 2023-01-01

## 2023-01-01 ENCOUNTER — APPOINTMENT (OUTPATIENT)
Dept: RADIATION ONCOLOGY | Facility: CLINIC | Age: 63
End: 2023-01-01

## 2023-01-01 ENCOUNTER — OUTPATIENT (OUTPATIENT)
Dept: OUTPATIENT SERVICES | Facility: HOSPITAL | Age: 63
LOS: 1 days | End: 2023-01-01
Payer: COMMERCIAL

## 2023-01-01 ENCOUNTER — RX RENEWAL (OUTPATIENT)
Age: 63
End: 2023-01-01

## 2023-01-01 ENCOUNTER — APPOINTMENT (OUTPATIENT)
Dept: PLASTIC SURGERY | Facility: CLINIC | Age: 63
End: 2023-01-01
Payer: MEDICAID

## 2023-01-01 ENCOUNTER — APPOINTMENT (OUTPATIENT)
Dept: PHYSICAL MEDICINE AND REHAB | Facility: CLINIC | Age: 63
End: 2023-01-01

## 2023-01-01 ENCOUNTER — APPOINTMENT (OUTPATIENT)
Dept: ULTRASOUND IMAGING | Facility: CLINIC | Age: 63
End: 2023-01-01
Payer: MEDICAID

## 2023-01-01 ENCOUNTER — RESULT REVIEW (OUTPATIENT)
Age: 63
End: 2023-01-01

## 2023-01-01 ENCOUNTER — APPOINTMENT (OUTPATIENT)
Dept: CT IMAGING | Facility: CLINIC | Age: 63
End: 2023-01-01

## 2023-01-01 ENCOUNTER — APPOINTMENT (OUTPATIENT)
Dept: PHYSICAL MEDICINE AND REHAB | Facility: CLINIC | Age: 63
End: 2023-01-01
Payer: MEDICAID

## 2023-01-01 ENCOUNTER — OUTPATIENT (OUTPATIENT)
Dept: OUTPATIENT SERVICES | Facility: HOSPITAL | Age: 63
LOS: 1 days | End: 2023-01-01

## 2023-01-01 VITALS
HEART RATE: 85 BPM | HEIGHT: 62 IN | DIASTOLIC BLOOD PRESSURE: 87 MMHG | WEIGHT: 210 LBS | RESPIRATION RATE: 14 BRPM | BODY MASS INDEX: 38.64 KG/M2 | SYSTOLIC BLOOD PRESSURE: 149 MMHG

## 2023-01-01 VITALS
HEIGHT: 62 IN | WEIGHT: 210 LBS | DIASTOLIC BLOOD PRESSURE: 80 MMHG | SYSTOLIC BLOOD PRESSURE: 136 MMHG | RESPIRATION RATE: 16 BRPM | BODY MASS INDEX: 38.64 KG/M2

## 2023-01-01 VITALS — BODY MASS INDEX: 38.64 KG/M2 | WEIGHT: 210 LBS | HEIGHT: 62 IN

## 2023-01-01 VITALS — OXYGEN SATURATION: 98 % | HEART RATE: 58 BPM

## 2023-01-01 DIAGNOSIS — N64.4 MASTODYNIA: ICD-10-CM

## 2023-01-01 DIAGNOSIS — J44.9 CHRONIC OBSTRUCTIVE PULMONARY DISEASE, UNSPECIFIED: ICD-10-CM

## 2023-01-01 DIAGNOSIS — J44.1 CHRONIC OBSTRUCTIVE PULMONARY DISEASE WITH (ACUTE) EXACERBATION: ICD-10-CM

## 2023-01-01 DIAGNOSIS — Z98.890 OTHER SPECIFIED POSTPROCEDURAL STATES: Chronic | ICD-10-CM

## 2023-01-01 DIAGNOSIS — M79.2 NEURALGIA AND NEURITIS, UNSPECIFIED: ICD-10-CM

## 2023-01-01 DIAGNOSIS — M54.50 LOW BACK PAIN, UNSPECIFIED: ICD-10-CM

## 2023-01-01 DIAGNOSIS — Z98.891 HISTORY OF UTERINE SCAR FROM PREVIOUS SURGERY: Chronic | ICD-10-CM

## 2023-01-01 DIAGNOSIS — Z85.3 PERSONAL HISTORY OF MALIGNANT NEOPLASM OF BREAST: ICD-10-CM

## 2023-01-01 DIAGNOSIS — Z00.00 ENCOUNTER FOR GENERAL ADULT MEDICAL EXAMINATION WITHOUT ABNORMAL FINDINGS: ICD-10-CM

## 2023-01-01 DIAGNOSIS — F17.210 NICOTINE DEPENDENCE, CIGARETTES, UNCOMPLICATED: ICD-10-CM

## 2023-01-01 DIAGNOSIS — C50.919 MALIGNANT NEOPLASM OF UNSPECIFIED SITE OF UNSPECIFIED FEMALE BREAST: ICD-10-CM

## 2023-01-01 DIAGNOSIS — Z90.49 ACQUIRED ABSENCE OF OTHER SPECIFIED PARTS OF DIGESTIVE TRACT: Chronic | ICD-10-CM

## 2023-01-01 DIAGNOSIS — Z00.8 ENCOUNTER FOR OTHER GENERAL EXAMINATION: ICD-10-CM

## 2023-01-01 DIAGNOSIS — F17.219 NICOTINE DEPENDENCE, CIGARETTES, WITH UNSPECIFIED NICOTINE-INDUCED DISORDERS: ICD-10-CM

## 2023-01-01 PROCEDURE — 71271 CT THORAX LUNG CANCER SCR C-: CPT | Mod: 26

## 2023-01-01 PROCEDURE — 99406 BEHAV CHNG SMOKING 3-10 MIN: CPT

## 2023-01-01 PROCEDURE — 99213 OFFICE O/P EST LOW 20 MIN: CPT

## 2023-01-01 PROCEDURE — 71271 CT THORAX LUNG CANCER SCR C-: CPT

## 2023-01-01 PROCEDURE — 99214 OFFICE O/P EST MOD 30 MIN: CPT | Mod: 25

## 2023-01-01 PROCEDURE — 99214 OFFICE O/P EST MOD 30 MIN: CPT

## 2023-01-01 PROCEDURE — 93970 EXTREMITY STUDY: CPT | Mod: 26

## 2023-01-01 RX ORDER — OXYCODONE 5 MG/1
5 TABLET ORAL
Qty: 7 | Refills: 0 | Status: ACTIVE | COMMUNITY
Start: 2023-01-01 | End: 1900-01-01

## 2023-01-01 RX ORDER — DOXYCYCLINE HYCLATE 100 MG/1
100 CAPSULE ORAL
Qty: 10 | Refills: 3 | Status: ACTIVE | COMMUNITY
Start: 2023-01-01 | End: 1900-01-01

## 2023-01-01 RX ORDER — OXYCODONE 5 MG/1
5 TABLET ORAL
Qty: 12 | Refills: 0 | Status: DISCONTINUED | COMMUNITY
Start: 2022-01-01 | End: 2023-01-01

## 2023-01-01 RX ORDER — OXYCODONE AND ACETAMINOPHEN 5; 325 MG/1; MG/1
5-325 TABLET ORAL
Qty: 24 | Refills: 0 | Status: DISCONTINUED | COMMUNITY
Start: 2022-01-01 | End: 2023-01-01

## 2023-01-01 RX ORDER — OXYCODONE 5 MG/1
5 TABLET ORAL TWICE DAILY
Qty: 10 | Refills: 0 | Status: DISCONTINUED | COMMUNITY
Start: 2022-01-01 | End: 2023-01-01

## 2023-01-01 RX ORDER — OXYCODONE 5 MG/1
5 TABLET ORAL TWICE DAILY
Qty: 10 | Refills: 0 | Status: DISCONTINUED | COMMUNITY
Start: 2023-01-01 | End: 2023-01-01

## 2023-01-01 RX ORDER — ALBUTEROL SULFATE 90 UG/1
108 (90 BASE) INHALANT RESPIRATORY (INHALATION)
Qty: 1 | Refills: 1 | Status: ACTIVE | COMMUNITY
Start: 2023-01-01 | End: 1900-01-01

## 2023-01-01 RX ORDER — PREGABALIN 25 MG/1
25 CAPSULE ORAL
Qty: 60 | Refills: 2 | Status: ACTIVE | COMMUNITY
Start: 2023-01-01 | End: 1900-01-01

## 2023-01-01 RX ORDER — TORSEMIDE 10 MG/1
10 TABLET ORAL DAILY
Qty: 30 | Refills: 3 | Status: DISCONTINUED | COMMUNITY
Start: 2022-01-01 | End: 2023-01-01

## 2023-01-01 RX ORDER — OXYCODONE 5 MG/1
5 TABLET ORAL
Qty: 10 | Refills: 0 | Status: ACTIVE | COMMUNITY
Start: 2023-01-01 | End: 1900-01-01

## 2023-01-01 RX ORDER — TAMOXIFEN CITRATE 20 MG/1
20 TABLET, FILM COATED ORAL
Qty: 30 | Refills: 2 | Status: ACTIVE | COMMUNITY
Start: 2019-05-22 | End: 1900-01-01

## 2023-01-01 RX ORDER — PREDNISONE 10 MG/1
10 TABLET ORAL
Qty: 30 | Refills: 3 | Status: ACTIVE | COMMUNITY
Start: 2023-01-01 | End: 1900-01-01

## 2023-01-01 RX ORDER — GABAPENTIN 100 MG/1
100 CAPSULE ORAL
Qty: 60 | Refills: 2 | Status: DISCONTINUED | COMMUNITY
Start: 2021-07-16 | End: 2023-01-01

## 2023-01-01 RX ORDER — CEFADROXIL 500 MG/1
500 CAPSULE ORAL TWICE DAILY
Qty: 14 | Refills: 0 | Status: DISCONTINUED | COMMUNITY
Start: 2022-01-01 | End: 2023-01-01

## 2023-01-01 RX ORDER — OXYCODONE 5 MG/1
5 TABLET ORAL
Qty: 8 | Refills: 0 | Status: DISCONTINUED | COMMUNITY
Start: 2022-01-01 | End: 2023-01-01

## 2023-01-01 RX ORDER — OXYCODONE 5 MG/1
5 TABLET ORAL
Qty: 12 | Refills: 0 | Status: ACTIVE | COMMUNITY
Start: 2023-01-01 | End: 1900-01-01

## 2023-01-01 RX ORDER — ONDANSETRON 4 MG/1
4 TABLET, ORALLY DISINTEGRATING ORAL
Qty: 9 | Refills: 0 | Status: DISCONTINUED | COMMUNITY
Start: 2022-01-01 | End: 2023-01-01

## 2023-01-01 RX ORDER — OXYCODONE 5 MG/1
5 TABLET ORAL EVERY 6 HOURS
Qty: 6 | Refills: 0 | Status: DISCONTINUED | COMMUNITY
Start: 2022-01-01 | End: 2023-01-01

## 2023-02-06 NOTE — ASSESSMENT
[FreeTextEntry1] : History of stage T1c N0 invasive ductal breast cancer, ER positive, status post lumpectomy and radiation therapy with postoperative hormonal therapy.\par No evidence of recurrent disease.\par Because of significant bilateral breast pain, bilateral mastectomy is being considered.\par \par - refer to Breast surgery ASAP\par - Breast US ordered, patient declines mammogram due to pain\par - Venous Doppler ordered to r/o DVT\par - patient to f/u with Pulm for COPD\par - Discussed at length the importance for breast imaging and f/u with breast surgery\par - F/u with Dr. Easton in 2-3 months \par

## 2023-02-06 NOTE — HISTORY OF PRESENT ILLNESS
[de-identified] : 61-year-old woman underwent needle localized left lumpectomy in October 2018, with reexcision due to a close margin.  Surgical stage was T1c N0, and ultimate treatment consisted of lumpectomy, followed by radiation and hormonal therapy given her estrogen receptor positive status.  She was partially compliant with hormonal therapy.\par She now complains of a burning pain in the breast for which bilateral mastectomy is being considered, and plastic surgery is following as well.\par  [de-identified] : Patient presents to office c/o ongoing left breast pain\par \par Patient evaluated by Dr. Bellamy on 12/23/22 who advised Mammogram and f/u with PT. Patient has done either. she is taking Oxycodone for breast pain. \par Patient with COPD, uses Anoro and albuterol. states breathing is stable, admits SOSA\par Patient also noted to had left Leg swelling. Denies pain, erythema \par

## 2023-02-06 NOTE — PHYSICAL EXAM
[Normal] : RRR, normal S1S2, no murmurs, rubs, gallops [de-identified] : left breast swollen/firm, tender to palpation, scaly/peeling erythematous lesion noted at 6:00

## 2023-02-24 PROBLEM — J44.9 COPD, MODERATE: Status: RESOLVED | Noted: 2017-07-07 | Resolved: 2023-01-01

## 2023-02-24 PROBLEM — J44.9 COPD, SEVERE: Status: ACTIVE | Noted: 2023-01-01

## 2023-02-24 PROBLEM — J44.1 COPD WITH EXACERBATION: Status: ACTIVE | Noted: 2023-01-01

## 2023-02-24 PROBLEM — F17.210 SMOKES WITH GREATER THAN 30 PACK YEAR HISTORY: Status: ACTIVE | Noted: 2020-02-24

## 2023-02-24 NOTE — CONSULT LETTER
[Dear  ___] : Dear  [unfilled], [Consult Letter:] : I had the pleasure of evaluating your patient, [unfilled]. [Please see my note below.] : Please see my note below. [Consult Closing:] : Thank you very much for allowing me to participate in the care of this patient.  If you have any questions, please do not hesitate to contact me. [Sincerely,] : Sincerely, [DrRoby  ___] : Dr. HOUGH [DrRoby ___] : Dr. HOUGH [[facility], [facility]] : [facility], [facility] [Director, Respiratory Care] : Director, Respiratory Care [[facility]] : [facility] [FreeTextEntry3] : [facility]\par Pulmonary Critical Care\par Director Pulmonary Division\par Medical Director Respiratory Therapy\par [facility]\par \par

## 2023-02-24 NOTE — PROCEDURE
[FreeTextEntry1] : CT chest 4/21  stable minute nodules \par Sleep study 9/18: no sig JAGDISH, nocturnal hypoxemia\par PFts 10/22 severe air flow obstruction , normal TLC, mod decrease DLCO

## 2023-02-24 NOTE — DISCUSSION/SUMMARY
[FreeTextEntry1] : COPD Gold class III, asthma component, complicated by nicotine addiction, Depression, breast ca on Tamoxifen, post RT\par Currently with exacerbation\par exam without bronchospasm, normal sp02\par minute nodules on low dose rad CT scan stable 4/21, will repeat scan\par continued smoking cessation discussed at length, she will speak to PMD about Wellbutrin- advised close follow up and best prescribed by him\par continue  Anoro daily ( LABA,LAMA), to use duo neb tid as substitute, Trelegy samples given for now\par sleep study negative for JAGDISH,\par per pt recent duplex LE negative and swelling decreased\par 3-4 months or sooner if needed,prognosis guarded

## 2023-02-24 NOTE — COUNSELING
[Yes] : Risk of tobacco use and health benefits of smoking cessation discussed: Yes [Cessation strategies including cessation program discussed] : Cessation strategies including cessation program discussed [Use of nicotine replacement therapies and other medications discussed] : Use of nicotine replacement therapies and other medications discussed [Encouraged to pick a quit date and identify support needed to quit] : Encouraged to pick a quit date and identify support needed to quit [Smoking Cessation Program Referral] : Smoking Cessation Program Referral  [FreeTextEntry1] : 3

## 2023-02-24 NOTE — HISTORY OF PRESENT ILLNESS
[TextBox_4] : at baseline\par no fever, chill, chest pain\par using Anoro\par Cardiology Dr Hamlin\par post RT and lumpectomy, now LAVELL, on Tamoxifen\par smoke free\par no sig dyspnea\par \par 2/24/23\par smoking still anxiety and depression ( not homo or suicidal)\par Anoro daily\par on Tamoxifen\par sputum clear \par per pt recent covid negative\par \par  [FreeTextEntry1] : \par \par

## 2023-03-06 PROBLEM — M79.2 NEUROPATHIC PAIN: Status: ACTIVE | Noted: 2021-07-16

## 2023-03-06 PROBLEM — M54.50 LOW BACK PAIN: Status: ACTIVE | Noted: 2023-01-01

## 2023-03-06 NOTE — PHYSICAL EXAM
[FreeTextEntry1] : Gen: Patient is A&O x 3, NAD\par HEENT: EOMI, hearing grossly normal\par Resp: regular, non - labored\par CV: pulses regular\par Skin: no rashes, erythema\par Lymph: no clubbing, cyanosis, edema, or palpable lymphadenopathy\par Inspection: no instability or misalignment\par ROM: Limited in left shoulder abduction \par Palpation:  TTP left chest wall, TTP left QL\par Sensation: intact to light touch\par Reflexes: 1+ and symmetric throughout\par Strength: 5/5 throughout\par Special tests: -Mendoza's sign, -Straight leg raise  \par Gait: normal, non-antalgic\par \par Entire encounter performed with Jin present in room.\par

## 2023-03-06 NOTE — ASSESSMENT
[FreeTextEntry1] : 62-year-old female with history of breast cancer presenting for evaluation.\par \par #Postmastectomy pain syndrome\par -Pain location along previous surgical site with a component of fibrosis \par -Start breast rehabilitation program for myofascial release manual therapy with OT\par -Previous intolerance to gabapentin, denies any allergic reaction\par -Start pregabalin 25mg BID, I Stop # [id number]\par -Advised patient to obtain surveillance imaging ordered by her oncology team and to follow up with breast surgery\par \par #Low back pain:\par -No overt neurologic deficits on exam\par -Obtain Lumbar spine x-ray\par -Start pregabalin\par \par Follow-up in 1 month.

## 2023-03-06 NOTE — HISTORY OF PRESENT ILLNESS
[FreeTextEntry1] : Ms. Hill is a 62 year-old female with a history of invasive lobular carcinoma, ER+, NH negative, HER2 negative, [id number] of the left breast. She is s/p left breast lumpectomy in December, 2018 with Dr. Farias. She received 4240 cGy radiation therapy to the left breast completed on 4/18/2019.   S/p bilateral breast reduction in May 2022.  \par \par She reports that she has severe pain still located in her left chest wall region.  She describes tightness and aching and shooting pain.  She also reports having low back pain sometimes with radiation down her left lower leg.  Denies any overt trauma.  Denies any bowel bladder dysfunction.  Denies any overt weakness.

## 2023-03-10 PROBLEM — N64.4 BREAST PAIN: Status: ACTIVE | Noted: 2021-01-28

## 2023-03-10 PROBLEM — C50.919 BREAST CANCER: Status: ACTIVE | Noted: 2019-01-29

## 2023-03-10 PROBLEM — Z85.3 HISTORY OF LEFT BREAST CANCER: Status: ACTIVE | Noted: 2019-09-30

## 2023-03-10 NOTE — PHYSICAL EXAM
[de-identified] : NAD, AxOx3  [de-identified] : nonlabored breathing  [de-identified] : no edema [de-identified] : Left breast well healed and slight radiation changes present. Left breast has significantly softened. Mildly tender to inferior aspect of left breast. Well healed.  [de-identified] : Left flank incision well healed.  [de-identified] : Left flank- incision is well healed. There is no evidence of infection  [de-identified] : normal affect

## 2023-03-10 NOTE — HISTORY OF PRESENT ILLNESS
[FreeTextEntry1] : Kemi Hill is a 60 y/o female s/p excision of left flank mass. Patient continues to complain of left breast pain. She continues with massage to both breasts. \par Patient had started PT but states she experienced injury to her back.\par Patient is scheduled to see Dr. Fritz as her breast surgeon. \par Has not as yet seen a pain management provider.

## 2023-03-13 PROBLEM — F17.219 CIGARETTE NICOTINE DEPENDENCE WITH NICOTINE-INDUCED DISORDER: Status: ACTIVE | Noted: 2023-01-01

## 2023-03-13 NOTE — HISTORY OF PRESENT ILLNESS
[Former] : Former [TextBox_13] : Referred by Dr. Melvin Hopson.\par \par Ms. LOPEZ is a 62 year old female with a history of HTN, high cholesterol, COPD, emphysema and left breast cancer. Reviewed and confirmed that the patient meets screening eligibility criteria:\par \par 62 years old \par \par Smoking Status: Former smoker \par \par Number of pack(s) per day: 1\par Number of years smoked: 31\par Number of pack years smokin\par \par Number of years since quitting smokin\par Quit year: \par \par No  symptoms of lung cancer, including new cough, change in cough, hemoptysis, and unintentional weight loss.\par \par No personal history of lung cancer. No lung cancer in a first degree relative. No history of occupational exposures.  [YearQuit] : 2021 [PacksperDay] : 1 [N_Years] : 31 [PacksperYear] : 31

## 2023-05-05 ENCOUNTER — APPOINTMENT (OUTPATIENT)
Dept: PULMONOLOGY | Facility: CLINIC | Age: 63
End: 2023-05-05

## 2023-05-09 ENCOUNTER — APPOINTMENT (OUTPATIENT)
Dept: ULTRASOUND IMAGING | Facility: CLINIC | Age: 63
End: 2023-05-09

## 2023-05-22 ENCOUNTER — APPOINTMENT (OUTPATIENT)
Dept: BREAST CENTER | Facility: CLINIC | Age: 63
End: 2023-05-22

## 2023-06-23 NOTE — H&P PST ADULT - NEGATIVE GASTROINTESTINAL SYMPTOMS
None no abdominal pain/no change in bowel habits/no diarrhea/no steatorrhea/no flatulence/no melena/no nausea/no constipation/no hematochezia/no hiccoughs/no jaundice/no vomiting no abdominal pain/no change in bowel habits/no flatulence/no diarrhea/no steatorrhea/no melena/no nausea/no hematochezia/no hiccoughs/no jaundice/no vomiting

## 2023-10-01 PROBLEM — Z92.3 HISTORY OF RADIATION THERAPY: Status: ACTIVE | Noted: 2019-11-07

## 2023-11-27 NOTE — ASU PREOP CHECKLIST - HAND OFF
Emily called requesting a refill of the below medication which has been pended for you:     Requested Prescriptions     Pending Prescriptions Disp Refills    fluticasone (FLONASE) 50 MCG/ACT nasal spray [Pharmacy Med Name: FLUTICASONE PROP 50 MCG SPRAY] 48 g      Sig: instill 1-2 spray into each nostril daily       Last Appointment Date: 7/12/2023  Next Appointment Date: 1/15/2024    Allergies   Allergen Reactions    Latex Dermatitis    Amoxil [Amoxicillin] Diarrhea    Bactrim [Sulfamethoxazole-Trimethoprim] Itching    Cantaloupe (Diagnostic) Swelling     Tongue swells    Clindamycin/Lincomycin Nausea And Vomiting    Flagyl [Metronidazole] Other (See Comments)     Sees bugs crawling    Flomax [Tamsulosin Hcl] Nausea Only and Other (See Comments)     Lightheaded, couldn't walk by herself    Biaxin [Clarithromycin] Nausea And Vomiting    Ceftin [Cefuroxime Axetil] Diarrhea and Nausea And Vomiting    Keflex [Cephalexin] Nausea And Vomiting
yes

## 2024-03-01 NOTE — HISTORY OF PRESENT ILLNESS
[FreeTextEntry1] : for acute visit [de-identified] : runny nose sore throat \par congestion\par for 2 to 3 days\par some headache no painful swallowing\par has some dry cough Declines

## 2024-08-14 NOTE — H&P PST ADULT - GASTROINTESTINAL
"Ambulatory Visit  Name: Celine Roa      : 1973      MRN: [id number]  Encounter Provider: Olaf Rhodes MD  Encounter Date: 2024   Encounter department: [facility]    Assessment & Plan   1. Immunosuppression (HCC)  2. Systemic lupus erythematosus, unspecified SLE type, unspecified organ involvement status (HCC)  3. Mast cell activation syndrome (HCC)  4. Acquired hypothyroidism  5. Crohn's disease of colon with complication (HCC)       History of Present Illness     Patient here for f/up medical problems. Feeling generally good now. Has f/up [facility] upcoming. Is now on all three probiotics including Florastar. No GI upset currently. No N/V. Also no current issue with CP or SOB. Has h/o atrial tachycardia but no recent changes. Was bothered by corn exposure recently with some mouth tingling but no anaphylaxis and no need for epi pen.         Review of Systems   Constitutional:  Negative for activity change, appetite change and fever.   HENT:  Negative for sore throat and trouble swallowing.    Respiratory:  Negative for shortness of breath and wheezing.    Cardiovascular:  Negative for chest pain.   Gastrointestinal:  Negative for abdominal pain, blood in stool, nausea and vomiting.   Genitourinary:  Negative for dysuria.   Neurological:  Negative for syncope.   Psychiatric/Behavioral:  Negative for decreased concentration and dysphoric mood.        Objective     /78 (BP Location: Left arm, Patient Position: Sitting, Cuff Size: Adult)   Pulse (!) 111   Temp 98.6 °F (37 °C) (Tympanic)   Resp 16   Ht 5' 5.25\" (1.657 m)   Wt 74.8 kg (164 lb 12.8 oz)   SpO2 96%   BMI 27.21 kg/m²     Physical Exam  Vitals reviewed.   Constitutional:       General: She is not in acute distress.     Appearance: Normal appearance.   HENT:      Head: Normocephalic and atraumatic.   Eyes:      General: No scleral icterus.     Extraocular Movements: Extraocular " movements intact.      Conjunctiva/sclera: Conjunctivae normal.   Cardiovascular:      Rate and Rhythm: Normal rate and regular rhythm.      Pulses: Normal pulses.      Heart sounds: Normal heart sounds. No murmur heard.     No friction rub. No gallop.      Comments: Borderline tachy in 90's with my auscultation  Pulmonary:      Effort: Pulmonary effort is normal.      Breath sounds: Normal breath sounds.   Abdominal:      General: Bowel sounds are normal.      Palpations: Abdomen is soft.      Tenderness: There is no guarding or rebound.   Musculoskeletal:      Cervical back: Normal range of motion.      Right lower leg: No edema.      Left lower leg: No edema.   Skin:     General: Skin is warm and dry.   Neurological:      General: No focal deficit present.      Mental Status: She is alert.   Psychiatric:         Mood and Affect: Mood normal.         Behavior: Behavior normal.       Administrative Statements            details…

## 2024-10-02 NOTE — H&P PST ADULT - CIGARETTES, NUMBER OF YRS
Detail Level: Detailed Quality 226: Preventive Care And Screening: Tobacco Use: Screening And Cessation Intervention: Patient screened for tobacco use and is an ex/non-smoker Quality 47: Advance Care Plan: Advance Care Planning discussed and documented; advance care plan or surrogate decision maker documented in the medical record. Additional Notes: Documentation for MIPS  purposes only. Full Patient visit note from paper chart is available for review and also scanned in EMA chart. 20

## 2025-02-07 NOTE — ASU PATIENT PROFILE, ADULT - FALL HARM RISK - UNIVERSAL INTERVENTIONS
075
Bed in lowest position, wheels locked, appropriate side rails in place/Call bell, personal items and telephone in reach/Instruct patient to call for assistance before getting out of bed or chair/Non-slip footwear when patient is out of bed/[place] to call system/Physically safe environment - no spills, clutter or unnecessary equipment/Purposeful Proactive Rounding/Room/bathroom lighting operational, light cord in reach

## 2025-06-10 NOTE — ASU PREOP CHECKLIST - HEIGHT IN INCHES
PAST SURGICAL HISTORY:  H/O carpal tunnel repair     History of ankle surgery     S/P cholecystectomy     S/P hardware removal     S/P ORIF (open reduction internal fixation) fracture femur     3

## 2025-08-01 NOTE — ASSESSMENT
COLONOSCOPY REPORT.    Surgeon; Eldon Louise DO    ASSISTANT: None    2025    Abdoul Mckee  : 1964  MRN: [id number]    PREOPERATIVE DIAGNOSES / INDICATIONS;  Encounter for screening colonoscopy    POSTOPERATIVE DIAGNOSES;  2 mm ascending colon polyp-hot biopsy  5 mm ascending colon polyp-snare  2 mm ascending colon polyp hot biopsy    OPERATION PERFORMED:  Colonoscopy with hot biopsy and snare polypectomy    ESTIMATED BLOOD LESS:  Less than 5 mL    ANESTHESIA;  MAC    SPECIMENS: See note below for areas biopsied    COMPLICATIONS: None    IMPLANT: * No implants in log *    DESCRIPTION OF OPERATION;  The risks and benefits of the procedure were explained to the patient who demonstrated an understanding and consented to the procedure.  Informed consent discussion included discussion of risks of sedation, polypectomy, perforation and the risk of missed polyps and cancers.  The history was reviewed and the patient was examined.  The patient was stable for the procedure and monitored throughout.     Digital rectal exam revealed normal anal tone.  No mass lesions were felt on rectal exam.    The endoscope was then introduced into the rectum and advanced to the cecum.   Appendiceal orifice was visualized. The prep was adequate.    Terminal ileum was not intubated..    Mucosa in the cecum, ascending, transverse, descending, sigmoid colon and rectum was adequately visualized.    The polyps noted below were biopsied and fulgurated using hot biopsy forceps and Snare    2 mm ascending colon polyp-hot biopsy  5 mm ascending colon polyp-snare  2 mm ascending colon polyp hot biopsy    At the anal verge and retroflexion mild-to-moderate internal hemorrhoids were noticed.  No mass lesions or fissures were noticed in the anal canal.    Following the procedure, the colon was decompressed, the endoscope removed and the procedure was terminated. Immediate postoperative condition of patient was normal and transferred to  [FreeTextEntry1] : T1CN0 invasive lobular carcinoma, histologic grade II ER+/ME+ HER2-, s/p lumpectomy with negative margins\par \par Plan:\par -initiate radiotherapy with Dr. Fuentes. Subsequent antiestrogen medication anticipated at this time. Risks, benefits, and side effects discussed in detail.\par -Will check Oncotype DX - adjuvant chemotherapy only if hi-risk\par -follow up after reviewing results recovery area.     RECOMMENDATIONS;  Will call with biopsy results  Repeat colonoscopy in 5 years

## (undated) DEVICE — POSITIONER FOAM HEAD CRADLE

## (undated) DEVICE — SYR LUER LOK 20CC

## (undated) DEVICE — SUT MONOCRYL 3-0 27" PS-2 UNDYED

## (undated) DEVICE — DRSG COMBINE 5X9"

## (undated) DEVICE — SUT POLYSORB 2-0 30" V-20 UNDYED

## (undated) DEVICE — WRAP COMPRESSION CALF MED

## (undated) DEVICE — VENODYNE/SCD SLEEVE CALF LARGE

## (undated) DEVICE — GLV 6.5 PROTEXIS (BLUE)

## (undated) DEVICE — SUT DERMABOND 0.7ML

## (undated) DEVICE — ELCTR BOVIE PENCIL SMOKE EVACUATION

## (undated) DEVICE — NDL HYPO REGULAR BEVEL 25G X 1.5" (BLUE)

## (undated) DEVICE — DRAIN RESERVOIR EVACUATOR 100CC BARD

## (undated) DEVICE — DRAPE HALF SHEET 40X57"

## (undated) DEVICE — GLV 7.5 PROTEXIS

## (undated) DEVICE — SUT PLAIN GUT FAST ABSORBING 5-0 PC-1

## (undated) DEVICE — DRAIN BLAKE 15FR ROUND

## (undated) DEVICE — PACK MINOR

## (undated) DEVICE — BLADE SAFETY LOCK #15

## (undated) DEVICE — CONTAINER SPECIMEN PET

## (undated) DEVICE — TRAY IRRIGATION SYR BULB 60CC

## (undated) DEVICE — PLV-SCD MACHINE: Type: DURABLE MEDICAL EQUIPMENT

## (undated) DEVICE — CANISTER SUCTION LID GUARD 3000CC

## (undated) DEVICE — SUT MONOCRYL 4-0 27" PS-2 UNDYED

## (undated) DEVICE — SYR LUER LOK 10CC

## (undated) DEVICE — GLV 7 PROTEXIS

## (undated) DEVICE — BLANKET WARMER LOWER ADULT

## (undated) DEVICE — SOL IRR POUR NS 0.9% 500ML

## (undated) DEVICE — DRSG KLING 6"

## (undated) DEVICE — WARMING BLANKET LOWER ADULT

## (undated) DEVICE — DRAPE 3/4 SHEET W REINFORCEMENT 56X77"

## (undated) DEVICE — DRAPE SPLIT SHEET 77X108"

## (undated) DEVICE — DRSG STERISTRIPS 0.5X4"

## (undated) DEVICE — DRSG 4X4

## (undated) DEVICE — SPONGE LAP X-RAY DETECTABLE 18X18"

## (undated) DEVICE — DRSG STERISTRIPS 0.5 X 4"

## (undated) DEVICE — VENODYNE/SCD SLEEVE CALF MEDIUM

## (undated) DEVICE — DRAPE 3/4 SHEET 52X76"

## (undated) DEVICE — SUT POLYSORB 0 36" GS-21 UNDYED

## (undated) DEVICE — PACK MINOR WITH LAP

## (undated) DEVICE — SUT MONOSOF 5-0 18" P-13

## (undated) DEVICE — ELCTR BOVIE TIP NEEDLE INSULATED 2.8" EDGE

## (undated) DEVICE — SOL D5 NS 0.9% 1000ML

## (undated) DEVICE — BOWL STERILE 32OZ BLUE

## (undated) DEVICE — SYR LUER LOK 50CC

## (undated) DEVICE — DRSG BIOPATCH DISK W CHG 1" W 7.0MM HOLE

## (undated) DEVICE — GLV 8 PROTEXIS

## (undated) DEVICE — GLV 6.5 PROTEXIS (WHITE)

## (undated) DEVICE — DRSG SURGICAL BRA XL 40-42

## (undated) DEVICE — BLADE SAFETY LOCK #10

## (undated) DEVICE — GLV 6.5 PROTEXIS

## (undated) DEVICE — DRSG MASTISOL

## (undated) DEVICE — STAPLER SKIN VISI-STAT 35 WIDE

## (undated) DEVICE — DRAPE IOBAN 23X23"

## (undated) DEVICE — SUT SOFSILK 6-0 30" CV-11

## (undated) DEVICE — SOL IRR POUR H2O 1500ML

## (undated) DEVICE — POSITIONER STRAP ARMBOARD VELCRO TS-30 12

## (undated) DEVICE — SOLIDIFIER CANN EXPRESS 3K

## (undated) DEVICE — WARMING BLANKET UPPER ADULT